# Patient Record
Sex: MALE | Race: AMERICAN INDIAN OR ALASKA NATIVE
[De-identification: names, ages, dates, MRNs, and addresses within clinical notes are randomized per-mention and may not be internally consistent; named-entity substitution may affect disease eponyms.]

---

## 2018-08-19 ENCOUNTER — HOSPITAL ENCOUNTER (INPATIENT)
Dept: HOSPITAL 5 - ED | Age: 58
LOS: 34 days | Discharge: HOME | DRG: 689 | End: 2018-09-22
Attending: INTERNAL MEDICINE | Admitting: INTERNAL MEDICINE
Payer: COMMERCIAL

## 2018-08-19 DIAGNOSIS — E86.0: ICD-10-CM

## 2018-08-19 DIAGNOSIS — D69.6: ICD-10-CM

## 2018-08-19 DIAGNOSIS — G93.41: ICD-10-CM

## 2018-08-19 DIAGNOSIS — N39.0: Primary | ICD-10-CM

## 2018-08-19 DIAGNOSIS — E43: ICD-10-CM

## 2018-08-19 DIAGNOSIS — R62.7: ICD-10-CM

## 2018-08-19 DIAGNOSIS — F20.9: ICD-10-CM

## 2018-08-19 DIAGNOSIS — F41.9: ICD-10-CM

## 2018-08-19 DIAGNOSIS — F31.9: ICD-10-CM

## 2018-08-19 LAB
ALBUMIN SERPL-MCNC: 3.6 G/DL (ref 3.9–5)
ALT SERPL-CCNC: 63 UNITS/L (ref 7–56)
BACTERIA #/AREA URNS HPF: (no result) /HPF
BASOPHILS # (AUTO): 0 K/MM3 (ref 0–0.1)
BASOPHILS NFR BLD AUTO: 0.3 % (ref 0–1.8)
BENZODIAZEPINES SCREEN,URINE: (no result)
BILIRUB UR QL STRIP: (no result)
BLOOD UR QL VISUAL: (no result)
BUN SERPL-MCNC: 13 MG/DL (ref 9–20)
BUN/CREAT SERPL: 16 %
CALCIUM SERPL-MCNC: 9.1 MG/DL (ref 8.4–10.2)
EOSINOPHIL # BLD AUTO: 0.1 K/MM3 (ref 0–0.4)
EOSINOPHIL NFR BLD AUTO: 0.8 % (ref 0–4.3)
HCT VFR BLD CALC: 43.2 % (ref 35.5–45.6)
HEMOLYSIS INDEX: 1
HGB BLD-MCNC: 14.3 GM/DL (ref 11.8–15.2)
LYMPHOCYTES # BLD AUTO: 0.5 K/MM3 (ref 1.2–5.4)
LYMPHOCYTES NFR BLD AUTO: 7.4 % (ref 13.4–35)
MCH RBC QN AUTO: 28 PG (ref 28–32)
MCHC RBC AUTO-ENTMCNC: 33 % (ref 32–34)
MCV RBC AUTO: 83 FL (ref 84–94)
METHADONE SCREEN,URINE: (no result)
MONOCYTES # (AUTO): 0.4 K/MM3 (ref 0–0.8)
MONOCYTES % (AUTO): 5.8 % (ref 0–7.3)
MUCOUS THREADS #/AREA URNS HPF: (no result) /HPF
OPIATE SCREEN,URINE: (no result)
PH UR STRIP: 7 [PH] (ref 5–7)
PLATELET # BLD: 81 K/MM3 (ref 140–440)
RBC # BLD AUTO: 5.19 M/MM3 (ref 3.65–5.03)
RBC #/AREA URNS HPF: 6 /HPF (ref 0–6)
T4 FREE SERPL-MCNC: 0.92 NG/DL (ref 0.76–1.46)
UROBILINOGEN UR-MCNC: 4 MG/DL (ref ?–2)
WBC #/AREA URNS HPF: 92 /HPF (ref 0–6)

## 2018-08-19 PROCEDURE — 85027 COMPLETE CBC AUTOMATED: CPT

## 2018-08-19 PROCEDURE — 81001 URINALYSIS AUTO W/SCOPE: CPT

## 2018-08-19 PROCEDURE — 87086 URINE CULTURE/COLONY COUNT: CPT

## 2018-08-19 PROCEDURE — 36415 COLL VENOUS BLD VENIPUNCTURE: CPT

## 2018-08-19 PROCEDURE — 84484 ASSAY OF TROPONIN QUANT: CPT

## 2018-08-19 PROCEDURE — 83735 ASSAY OF MAGNESIUM: CPT

## 2018-08-19 PROCEDURE — 72170 X-RAY EXAM OF PELVIS: CPT

## 2018-08-19 PROCEDURE — 85025 COMPLETE CBC W/AUTO DIFF WBC: CPT

## 2018-08-19 PROCEDURE — G0480 DRUG TEST DEF 1-7 CLASSES: HCPCS

## 2018-08-19 PROCEDURE — 84443 ASSAY THYROID STIM HORMONE: CPT

## 2018-08-19 PROCEDURE — 82962 GLUCOSE BLOOD TEST: CPT

## 2018-08-19 PROCEDURE — 82553 CREATINE MB FRACTION: CPT

## 2018-08-19 PROCEDURE — 70450 CT HEAD/BRAIN W/O DYE: CPT

## 2018-08-19 PROCEDURE — 87040 BLOOD CULTURE FOR BACTERIA: CPT

## 2018-08-19 PROCEDURE — 84439 ASSAY OF FREE THYROXINE: CPT

## 2018-08-19 PROCEDURE — 71045 X-RAY EXAM CHEST 1 VIEW: CPT

## 2018-08-19 PROCEDURE — 82140 ASSAY OF AMMONIA: CPT

## 2018-08-19 PROCEDURE — 93010 ELECTROCARDIOGRAM REPORT: CPT

## 2018-08-19 PROCEDURE — 82607 VITAMIN B-12: CPT

## 2018-08-19 PROCEDURE — 80053 COMPREHEN METABOLIC PANEL: CPT

## 2018-08-19 PROCEDURE — 93005 ELECTROCARDIOGRAM TRACING: CPT

## 2018-08-19 PROCEDURE — 87076 CULTURE ANAEROBE IDENT EACH: CPT

## 2018-08-19 PROCEDURE — 87186 SC STD MICRODIL/AGAR DIL: CPT

## 2018-08-19 PROCEDURE — 95819 EEG AWAKE AND ASLEEP: CPT

## 2018-08-19 PROCEDURE — 70551 MRI BRAIN STEM W/O DYE: CPT

## 2018-08-19 PROCEDURE — 82747 ASSAY OF FOLIC ACID RBC: CPT

## 2018-08-19 PROCEDURE — 80048 BASIC METABOLIC PNL TOTAL CA: CPT

## 2018-08-19 PROCEDURE — 80320 DRUG SCREEN QUANTALCOHOLS: CPT

## 2018-08-19 PROCEDURE — 74018 RADEX ABDOMEN 1 VIEW: CPT

## 2018-08-19 PROCEDURE — 80307 DRUG TEST PRSMV CHEM ANLYZR: CPT

## 2018-08-19 PROCEDURE — 82550 ASSAY OF CK (CPK): CPT

## 2018-08-19 RX ADMIN — LORAZEPAM SCH MG: 2 INJECTION INTRAMUSCULAR; INTRAVENOUS at 21:35

## 2018-08-19 NOTE — CAT SCAN REPORT
FINAL REPORT



EXAM:  CT HEAD/BRAIN WO CON



HISTORY:  AMS, schizophrenia 



TECHNIQUE:  Standard unenhanced CT of the head at 5.0 millimeter

axial increments. 



PRIORS:  None.



FINDINGS:  

The ventricular system is normal in size and configuration. There

is no evidence for parenchymal volume loss.



There is no evidence for mass lesion, mass effect, midline shift,

acute intracranial hemorrhage, or acute ischemia/ infarction.



No evidence for acute skull fracture is seen.  No abnormality in

the overlying scalp soft tissues is seen.



Visualized paranasal sinuses are clear.



IMPRESSION:  

Negative CT of the head. No acute intracranial process noted.

## 2018-08-19 NOTE — EMERGENCY DEPARTMENT REPORT
ED Altered Mental Status HPI





- General


Chief Complaint: Medical Clearance


Stated Complaint: DEHYDRATION


Time Seen by Provider: 08/19/18 17:15


Source: family, EMS, old records reviewed (no previous medical record for review

)


Mode of arrival: Stretcher


Limitations: No Limitations, Altered Mental Status





- History of Present Illness


Initial Comments: 





58-year-old male with a past medical history schizophrenia, depression, and 

anxiety presents to the hospital with his family members for alteration in 

mental status.  End of July patient was experiencing hallucinations and 

therefore received an IM Aristada injection as an outpatient on July 30.  His 

hallucinations and threatening behavior worsened despite receiving a shot and 

therefore patient was taken to [facility] on Aug 6th after medical clearance at 

[facility] (presented Aug 3).  Family states they were informed by patient'

s primary psychiatrist that patient should now only be taking Abilify since he 

did receive an Aristada shot.  He was supposed to discontinue Zyprexa.  Family 

states he continued to receive Zyprexa while under inpatient psychiatric care.  

They received reports that patient was combative and continued to hallucinated 

and therefore was receiving multiple medications for sedation.  Patient was 

discharged back to the family care but presented in a catatonic state, wearing 

a diaper, unable to ambulate, unable to speak or care for himself on Aug 13.  

At his baseline patient has hallucinations due to psychosis but is typically 

alert and oriented 3 and able to perform daily living activities. Family 

recalled ambulance due to his state and he was taken back to [facility] 

for medical clearance. He was then transfered by back to [facility] on Aug 

14th. Now pt has not ambulated, eaten or had any thing to drink in at least the 

past 4-5 days so he was brought to the ED for evaluation. Pt also fell out of a 

wheelchair during [facility] stay.





- Related Data


 Allergies











Allergy/AdvReac Type Severity Reaction Status Date / Time


 


No Known Allergies Allergy   Unverified 08/19/18 17:07














ED Review of Systems


ROS: 


Stated complaint: DEHYDRATION


Other details as noted in HPI








ED Past Medical Hx





- Past Medical History


Hx Psychiatric Treatment: Yes (schizophrenia, depression, anxiety)





- Social History


Smoking Status: Never Smoker


Substance Use Type: None





ED Physical Exam





- General


Limitations: No Limitations, Altered Mental Status





- Other


Other exam information: 





General: Altered, thin-appearing


Head exam: Atraumatic, normocephalic


Eyes exam:  pupils equal reactive to light, extraocular movements intact


ENT: Extremity dry oral mucous membranes


Neck exam: Normal inspection, no meningismus


Respiratory exam: Clear to auscultation bilateral, no wheezes, rales, crackles


Cardiovascular: Normal rate and rhythm, normal heart sounds


Abdomen: Soft, nondistended, and  nontender, with normal bowel sounds, no 

rebound, or guarding


Extremity: Grimace with passive movement of right hip.  No deformity


Back: Normal Inspection, full range of motion, no tenderness


Neurologic: Altered, lethargic, incomprehensible sounds, does not follow 

commands, moves all extremities equally with sensation grossly intact


Psychiatric: normal affect, normal mood


Skin: Warm, dry, intact





ED Course


 Vital Signs











  08/19/18 08/19/18 08/19/18





  16:42 16:45 16:53


 


Temperature   


 


Pulse Rate 95 H 94 H 92 H


 


Respiratory 14 14 18





Rate   


 


Blood Pressure  110/71 110/71


 


Blood Pressure   





[Left]   


 


O2 Sat by Pulse  97 97





Oximetry   














  08/19/18 08/19/18 08/19/18





  17:00 17:15 17:30


 


Temperature  100 F H 


 


Pulse Rate 95 H 91 H 88


 


Respiratory 15 14 14





Rate   


 


Blood Pressure 110/71 105/75 105/75


 


Blood Pressure   





[Left]   


 


O2 Sat by Pulse 99 97 





Oximetry   














  08/19/18 08/19/18





  18:22 19:15


 


Temperature  100.6 F H


 


Pulse Rate  93 H


 


Respiratory 18 14





Rate  


 


Blood Pressure  


 


Blood Pressure  113/77





[Left]  


 


O2 Sat by Pulse 97 99





Oximetry  














- Consultations


Consultation #1: 





08/19/18 20:49


case d/w Dr Fernandez regarding right trochanter avulsion fracture and acetabular 

irregularity.  He is not on call today but will be available to consult in 

house tomorrow.





- Lab Data


Result diagrams: 


 08/19/18 17:27





 08/19/18 17:27


 Lab Results











  08/19/18 08/19/18 08/19/18 Range/Units





  17:24 17:27 17:27 


 


WBC   7.3   (4.5-11.0)  K/mm3


 


RBC   5.19 H   (3.65-5.03)  M/mm3


 


Hgb   14.3   (11.8-15.2)  gm/dl


 


Hct   43.2   (35.5-45.6)  %


 


MCV   83 L   (84-94)  fl


 


MCH   28   (28-32)  pg


 


MCHC   33   (32-34)  %


 


RDW   14.9   (13.2-15.2)  %


 


Plt Count   81 L   (140-440)  K/mm3


 


Lymph % (Auto)   7.4 L   (13.4-35.0)  %


 


Mono % (Auto)   5.8   (0.0-7.3)  %


 


Eos % (Auto)   0.8   (0.0-4.3)  %


 


Baso % (Auto)   0.3   (0.0-1.8)  %


 


Lymph #   0.5 L   (1.2-5.4)  K/mm3


 


Mono #   0.4   (0.0-0.8)  K/mm3


 


Eos #   0.1   (0.0-0.4)  K/mm3


 


Baso #   0.0   (0.0-0.1)  K/mm3


 


Seg Neutrophils %   85.7 H   (40.0-70.0)  %


 


Seg Neutrophils #   6.2   (1.8-7.7)  K/mm3


 


Sodium    141  (137-145)  mmol/L


 


Potassium    3.8  (3.6-5.0)  mmol/L


 


Chloride    99.9  ()  mmol/L


 


Carbon Dioxide    28  (22-30)  mmol/L


 


Anion Gap    17  mmol/L


 


BUN    13  (9-20)  mg/dL


 


Creatinine    0.8  (0.8-1.5)  mg/dL


 


Estimated GFR    > 60  ml/min


 


BUN/Creatinine Ratio    16  %


 


Glucose    148 H  ()  mg/dL


 


POC Glucose     ()  


 


Calcium    9.1  (8.4-10.2)  mg/dL


 


Magnesium  2.20    (1.7-2.3)  mg/dL


 


Total Bilirubin    1.20  (0.1-1.2)  mg/dL


 


AST    50 H  (5-40)  units/L


 


ALT    63 H  (7-56)  units/L


 


Alkaline Phosphatase    67  ()  units/L


 


Ammonia     (25-60)  umol/L


 


Total Creatine Kinase     ()  units/L


 


CK-MB (CK-2)     (0.0-4.0)  ng/mL


 


CK-MB (CK-2) Rel Index     (0-4)  


 


Troponin T     (0.00-0.029)  ng/mL


 


Total Protein    7.2  (6.3-8.2)  g/dL


 


Albumin    3.6 L  (3.9-5)  g/dL


 


Albumin/Globulin Ratio    1.0  %


 


TSH     (0.270-4.200)  mlU/mL


 


Free T4     (0.76-1.46)  ng/dL


 


Urine Color     (Yellow)  


 


Urine Turbidity     (Clear)  


 


Urine pH     (5.0-7.0)  


 


Ur Specific Gravity     (1.003-1.030)  


 


Urine Protein     (Negative)  mg/dL


 


Urine Glucose (UA)     (Negative)  mg/dL


 


Urine Ketones     (Negative)  mg/dL


 


Urine Blood     (Negative)  


 


Urine Nitrite     (Negative)  


 


Urine Bilirubin     (Negative)  


 


Urine Urobilinogen     (<2.0)  mg/dL


 


Ur Leukocyte Esterase     (Negative)  


 


Urine WBC (Auto)     (0.0-6.0)  /HPF


 


Urine RBC (Auto)     (0.0-6.0)  /HPF


 


U Epithel Cells (Auto)     (0-13.0)  /HPF


 


Urine Bacteria (Auto)     (Negative)  /HPF


 


Ur Transition Epith Cell     /HPF


 


Urine Mucus     /HPF


 


Salicylates     (2.8-20.0)  mg/dL


 


Urine Opiates Screen     


 


Urine Methadone Screen     


 


Acetaminophen     (10.0-30.0)  ug/mL


 


Ur Barbiturates Screen     


 


Ur Phencyclidine Scrn     


 


Ur Amphetamines Screen     


 


U Benzodiazepines Scrn     


 


Urine Cocaine Screen     


 


U Marijuana (THC) Screen     


 


Drugs of Abuse Note     


 


Plasma/Serum Alcohol     (0-0.07)  %














  08/19/18 08/19/18 08/19/18 Range/Units





  17:27 17:27 17:27 


 


WBC     (4.5-11.0)  K/mm3


 


RBC     (3.65-5.03)  M/mm3


 


Hgb     (11.8-15.2)  gm/dl


 


Hct     (35.5-45.6)  %


 


MCV     (84-94)  fl


 


MCH     (28-32)  pg


 


MCHC     (32-34)  %


 


RDW     (13.2-15.2)  %


 


Plt Count     (140-440)  K/mm3


 


Lymph % (Auto)     (13.4-35.0)  %


 


Mono % (Auto)     (0.0-7.3)  %


 


Eos % (Auto)     (0.0-4.3)  %


 


Baso % (Auto)     (0.0-1.8)  %


 


Lymph #     (1.2-5.4)  K/mm3


 


Mono #     (0.0-0.8)  K/mm3


 


Eos #     (0.0-0.4)  K/mm3


 


Baso #     (0.0-0.1)  K/mm3


 


Seg Neutrophils %     (40.0-70.0)  %


 


Seg Neutrophils #     (1.8-7.7)  K/mm3


 


Sodium     (137-145)  mmol/L


 


Potassium     (3.6-5.0)  mmol/L


 


Chloride     ()  mmol/L


 


Carbon Dioxide     (22-30)  mmol/L


 


Anion Gap     mmol/L


 


BUN     (9-20)  mg/dL


 


Creatinine     (0.8-1.5)  mg/dL


 


Estimated GFR     ml/min


 


BUN/Creatinine Ratio     %


 


Glucose     ()  mg/dL


 


POC Glucose     ()  


 


Calcium     (8.4-10.2)  mg/dL


 


Magnesium     (1.7-2.3)  mg/dL


 


Total Bilirubin     (0.1-1.2)  mg/dL


 


AST     (5-40)  units/L


 


ALT     (7-56)  units/L


 


Alkaline Phosphatase     ()  units/L


 


Ammonia     (25-60)  umol/L


 


Total Creatine Kinase     ()  units/L


 


CK-MB (CK-2)     (0.0-4.0)  ng/mL


 


CK-MB (CK-2) Rel Index     (0-4)  


 


Troponin T     (0.00-0.029)  ng/mL


 


Total Protein     (6.3-8.2)  g/dL


 


Albumin     (3.9-5)  g/dL


 


Albumin/Globulin Ratio     %


 


TSH     (0.270-4.200)  mlU/mL


 


Free T4     (0.76-1.46)  ng/dL


 


Urine Color     (Yellow)  


 


Urine Turbidity     (Clear)  


 


Urine pH     (5.0-7.0)  


 


Ur Specific Gravity     (1.003-1.030)  


 


Urine Protein     (Negative)  mg/dL


 


Urine Glucose (UA)     (Negative)  mg/dL


 


Urine Ketones     (Negative)  mg/dL


 


Urine Blood     (Negative)  


 


Urine Nitrite     (Negative)  


 


Urine Bilirubin     (Negative)  


 


Urine Urobilinogen     (<2.0)  mg/dL


 


Ur Leukocyte Esterase     (Negative)  


 


Urine WBC (Auto)     (0.0-6.0)  /HPF


 


Urine RBC (Auto)     (0.0-6.0)  /HPF


 


U Epithel Cells (Auto)     (0-13.0)  /HPF


 


Urine Bacteria (Auto)     (Negative)  /HPF


 


Ur Transition Epith Cell     /HPF


 


Urine Mucus     /HPF


 


Salicylates  < 0.3 L    (2.8-20.0)  mg/dL


 


Urine Opiates Screen     


 


Urine Methadone Screen     


 


Acetaminophen   < 5.0 L   (10.0-30.0)  ug/mL


 


Ur Barbiturates Screen     


 


Ur Phencyclidine Scrn     


 


Ur Amphetamines Screen     


 


U Benzodiazepines Scrn     


 


Urine Cocaine Screen     


 


U Marijuana (THC) Screen     


 


Drugs of Abuse Note     


 


Plasma/Serum Alcohol    < 0.01  (0-0.07)  %














  08/19/18 08/19/18 08/19/18 Range/Units





  17:27 17:27 17:27 


 


WBC     (4.5-11.0)  K/mm3


 


RBC     (3.65-5.03)  M/mm3


 


Hgb     (11.8-15.2)  gm/dl


 


Hct     (35.5-45.6)  %


 


MCV     (84-94)  fl


 


MCH     (28-32)  pg


 


MCHC     (32-34)  %


 


RDW     (13.2-15.2)  %


 


Plt Count     (140-440)  K/mm3


 


Lymph % (Auto)     (13.4-35.0)  %


 


Mono % (Auto)     (0.0-7.3)  %


 


Eos % (Auto)     (0.0-4.3)  %


 


Baso % (Auto)     (0.0-1.8)  %


 


Lymph #     (1.2-5.4)  K/mm3


 


Mono #     (0.0-0.8)  K/mm3


 


Eos #     (0.0-0.4)  K/mm3


 


Baso #     (0.0-0.1)  K/mm3


 


Seg Neutrophils %     (40.0-70.0)  %


 


Seg Neutrophils #     (1.8-7.7)  K/mm3


 


Sodium     (137-145)  mmol/L


 


Potassium     (3.6-5.0)  mmol/L


 


Chloride     ()  mmol/L


 


Carbon Dioxide     (22-30)  mmol/L


 


Anion Gap     mmol/L


 


BUN     (9-20)  mg/dL


 


Creatinine     (0.8-1.5)  mg/dL


 


Estimated GFR     ml/min


 


BUN/Creatinine Ratio     %


 


Glucose     ()  mg/dL


 


POC Glucose     ()  


 


Calcium     (8.4-10.2)  mg/dL


 


Magnesium     (1.7-2.3)  mg/dL


 


Total Bilirubin     (0.1-1.2)  mg/dL


 


AST     (5-40)  units/L


 


ALT     (7-56)  units/L


 


Alkaline Phosphatase     ()  units/L


 


Ammonia  17.0 L    (25-60)  umol/L


 


Total Creatine Kinase   147   ()  units/L


 


CK-MB (CK-2)     (0.0-4.0)  ng/mL


 


CK-MB (CK-2) Rel Index     (0-4)  


 


Troponin T     (0.00-0.029)  ng/mL


 


Total Protein     (6.3-8.2)  g/dL


 


Albumin     (3.9-5)  g/dL


 


Albumin/Globulin Ratio     %


 


TSH    3.030  (0.270-4.200)  mlU/mL


 


Free T4    0.92  (0.76-1.46)  ng/dL


 


Urine Color     (Yellow)  


 


Urine Turbidity     (Clear)  


 


Urine pH     (5.0-7.0)  


 


Ur Specific Gravity     (1.003-1.030)  


 


Urine Protein     (Negative)  mg/dL


 


Urine Glucose (UA)     (Negative)  mg/dL


 


Urine Ketones     (Negative)  mg/dL


 


Urine Blood     (Negative)  


 


Urine Nitrite     (Negative)  


 


Urine Bilirubin     (Negative)  


 


Urine Urobilinogen     (<2.0)  mg/dL


 


Ur Leukocyte Esterase     (Negative)  


 


Urine WBC (Auto)     (0.0-6.0)  /HPF


 


Urine RBC (Auto)     (0.0-6.0)  /HPF


 


U Epithel Cells (Auto)     (0-13.0)  /HPF


 


Urine Bacteria (Auto)     (Negative)  /HPF


 


Ur Transition Epith Cell     /HPF


 


Urine Mucus     /HPF


 


Salicylates     (2.8-20.0)  mg/dL


 


Urine Opiates Screen     


 


Urine Methadone Screen     


 


Acetaminophen     (10.0-30.0)  ug/mL


 


Ur Barbiturates Screen     


 


Ur Phencyclidine Scrn     


 


Ur Amphetamines Screen     


 


U Benzodiazepines Scrn     


 


Urine Cocaine Screen     


 


U Marijuana (THC) Screen     


 


Drugs of Abuse Note     


 


Plasma/Serum Alcohol     (0-0.07)  %














  08/19/18 08/19/18 08/19/18 Range/Units





  17:43 17:44 17:59 


 


WBC     (4.5-11.0)  K/mm3


 


RBC     (3.65-5.03)  M/mm3


 


Hgb     (11.8-15.2)  gm/dl


 


Hct     (35.5-45.6)  %


 


MCV     (84-94)  fl


 


MCH     (28-32)  pg


 


MCHC     (32-34)  %


 


RDW     (13.2-15.2)  %


 


Plt Count     (140-440)  K/mm3


 


Lymph % (Auto)     (13.4-35.0)  %


 


Mono % (Auto)     (0.0-7.3)  %


 


Eos % (Auto)     (0.0-4.3)  %


 


Baso % (Auto)     (0.0-1.8)  %


 


Lymph #     (1.2-5.4)  K/mm3


 


Mono #     (0.0-0.8)  K/mm3


 


Eos #     (0.0-0.4)  K/mm3


 


Baso #     (0.0-0.1)  K/mm3


 


Seg Neutrophils %     (40.0-70.0)  %


 


Seg Neutrophils #     (1.8-7.7)  K/mm3


 


Sodium     (137-145)  mmol/L


 


Potassium     (3.6-5.0)  mmol/L


 


Chloride     ()  mmol/L


 


Carbon Dioxide     (22-30)  mmol/L


 


Anion Gap     mmol/L


 


BUN     (9-20)  mg/dL


 


Creatinine     (0.8-1.5)  mg/dL


 


Estimated GFR     ml/min


 


BUN/Creatinine Ratio     %


 


Glucose     ()  mg/dL


 


POC Glucose     ()  


 


Calcium     (8.4-10.2)  mg/dL


 


Magnesium     (1.7-2.3)  mg/dL


 


Total Bilirubin     (0.1-1.2)  mg/dL


 


AST     (5-40)  units/L


 


ALT     (7-56)  units/L


 


Alkaline Phosphatase     ()  units/L


 


Ammonia     (25-60)  umol/L


 


Total Creatine Kinase   147   ()  units/L


 


CK-MB (CK-2)   1.6   (0.0-4.0)  ng/mL


 


CK-MB (CK-2) Rel Index   1.0   (0-4)  


 


Troponin T  < 0.010    (0.00-0.029)  ng/mL


 


Total Protein     (6.3-8.2)  g/dL


 


Albumin     (3.9-5)  g/dL


 


Albumin/Globulin Ratio     %


 


TSH     (0.270-4.200)  mlU/mL


 


Free T4     (0.76-1.46)  ng/dL


 


Urine Color    Shamika  (Yellow)  


 


Urine Turbidity    Cloudy  (Clear)  


 


Urine pH    7.0  (5.0-7.0)  


 


Ur Specific Gravity    1.021  (1.003-1.030)  


 


Urine Protein    30 mg/dl  (Negative)  mg/dL


 


Urine Glucose (UA)    Neg  (Negative)  mg/dL


 


Urine Ketones    Neg  (Negative)  mg/dL


 


Urine Blood    Neg  (Negative)  


 


Urine Nitrite    Neg  (Negative)  


 


Urine Bilirubin    Neg  (Negative)  


 


Urine Urobilinogen    4.0  (<2.0)  mg/dL


 


Ur Leukocyte Esterase    Lg  (Negative)  


 


Urine WBC (Auto)    92.0 H  (0.0-6.0)  /HPF


 


Urine RBC (Auto)    6.0  (0.0-6.0)  /HPF


 


U Epithel Cells (Auto)    < 1.0  (0-13.0)  /HPF


 


Urine Bacteria (Auto)    3+  (Negative)  /HPF


 


Ur Transition Epith Cell    2  /HPF


 


Urine Mucus    1+  /HPF


 


Salicylates     (2.8-20.0)  mg/dL


 


Urine Opiates Screen     


 


Urine Methadone Screen     


 


Acetaminophen     (10.0-30.0)  ug/mL


 


Ur Barbiturates Screen     


 


Ur Phencyclidine Scrn     


 


Ur Amphetamines Screen     


 


U Benzodiazepines Scrn     


 


Urine Cocaine Screen     


 


U Marijuana (THC) Screen     


 


Drugs of Abuse Note     


 


Plasma/Serum Alcohol     (0-0.07)  %














  08/19/18 08/19/18 Range/Units





  17:59 18:07 


 


WBC    (4.5-11.0)  K/mm3


 


RBC    (3.65-5.03)  M/mm3


 


Hgb    (11.8-15.2)  gm/dl


 


Hct    (35.5-45.6)  %


 


MCV    (84-94)  fl


 


MCH    (28-32)  pg


 


MCHC    (32-34)  %


 


RDW    (13.2-15.2)  %


 


Plt Count    (140-440)  K/mm3


 


Lymph % (Auto)    (13.4-35.0)  %


 


Mono % (Auto)    (0.0-7.3)  %


 


Eos % (Auto)    (0.0-4.3)  %


 


Baso % (Auto)    (0.0-1.8)  %


 


Lymph #    (1.2-5.4)  K/mm3


 


Mono #    (0.0-0.8)  K/mm3


 


Eos #    (0.0-0.4)  K/mm3


 


Baso #    (0.0-0.1)  K/mm3


 


Seg Neutrophils %    (40.0-70.0)  %


 


Seg Neutrophils #    (1.8-7.7)  K/mm3


 


Sodium    (137-145)  mmol/L


 


Potassium    (3.6-5.0)  mmol/L


 


Chloride    ()  mmol/L


 


Carbon Dioxide    (22-30)  mmol/L


 


Anion Gap    mmol/L


 


BUN    (9-20)  mg/dL


 


Creatinine    (0.8-1.5)  mg/dL


 


Estimated GFR    ml/min


 


BUN/Creatinine Ratio    %


 


Glucose    ()  mg/dL


 


POC Glucose   140 H  ()  


 


Calcium    (8.4-10.2)  mg/dL


 


Magnesium    (1.7-2.3)  mg/dL


 


Total Bilirubin    (0.1-1.2)  mg/dL


 


AST    (5-40)  units/L


 


ALT    (7-56)  units/L


 


Alkaline Phosphatase    ()  units/L


 


Ammonia    (25-60)  umol/L


 


Total Creatine Kinase    ()  units/L


 


CK-MB (CK-2)    (0.0-4.0)  ng/mL


 


CK-MB (CK-2) Rel Index    (0-4)  


 


Troponin T    (0.00-0.029)  ng/mL


 


Total Protein    (6.3-8.2)  g/dL


 


Albumin    (3.9-5)  g/dL


 


Albumin/Globulin Ratio    %


 


TSH    (0.270-4.200)  mlU/mL


 


Free T4    (0.76-1.46)  ng/dL


 


Urine Color    (Yellow)  


 


Urine Turbidity    (Clear)  


 


Urine pH    (5.0-7.0)  


 


Ur Specific Gravity    (1.003-1.030)  


 


Urine Protein    (Negative)  mg/dL


 


Urine Glucose (UA)    (Negative)  mg/dL


 


Urine Ketones    (Negative)  mg/dL


 


Urine Blood    (Negative)  


 


Urine Nitrite    (Negative)  


 


Urine Bilirubin    (Negative)  


 


Urine Urobilinogen    (<2.0)  mg/dL


 


Ur Leukocyte Esterase    (Negative)  


 


Urine WBC (Auto)    (0.0-6.0)  /HPF


 


Urine RBC (Auto)    (0.0-6.0)  /HPF


 


U Epithel Cells (Auto)    (0-13.0)  /HPF


 


Urine Bacteria (Auto)    (Negative)  /HPF


 


Ur Transition Epith Cell    /HPF


 


Urine Mucus    /HPF


 


Salicylates    (2.8-20.0)  mg/dL


 


Urine Opiates Screen  Presumptive negative   


 


Urine Methadone Screen  Presumptive negative   


 


Acetaminophen    (10.0-30.0)  ug/mL


 


Ur Barbiturates Screen  Presumptive negative   


 


Ur Phencyclidine Scrn  Presumptive negative   


 


Ur Amphetamines Screen  Presumptive negative   


 


U Benzodiazepines Scrn  Presumptive negative   


 


Urine Cocaine Screen  Presumptive negative   


 


U Marijuana (THC) Screen  Presumptive negative   


 


Drugs of Abuse Note  Disclamer   


 


Plasma/Serum Alcohol    (0-0.07)  %














- EKG Data


-: EKG Interpreted by Me


EKG shows normal: sinus rhythm, axis (qrs 65), QRS complexes (qrsd 106), ST-T 

waves (no stemi/t inv)


Rate: normal (65)


When compared to previous EKG there are: previous EKG unavailable





- Radiology Data


Radiology results: report reviewed


FINAL REPORT 





EXAM: CT HEAD/BRAIN WO CON 





HISTORY: AMS, schizophrenia 





TECHNIQUE: Standard unenhanced CT of the head at 5.0 millimeter 


axial increments. 





PRIORS: None. 





FINDINGS: 


The ventricular system is normal in size and configuration. There 


is no evidence for parenchymal volume loss. 





There is no evidence for mass lesion, mass effect, midline shift, 


acute intracranial hemorrhage, or acute ischemia/ infarction. 





No evidence for acute skull fracture is seen. No abnormality in 


the overlying scalp soft tissues is seen. 





Visualized paranasal sinuses are clear. 





IMPRESSION: 


Negative CT of the head. No acute intracranial process noted. 














FINAL REPORT 





EXAM: XR CHEST 1V AP 





HISTORY: ams, low grade fever 





TECHNIQUE: Frontal chest x-ray 





Comparison: None 





FINDINGS: 


Heart size is upper limits normal with mild left ventricular 


configuration. 





Lungs are clear and well expanded without focal infiltrate or 


consolidation. 





Tortuous descending aorta. 





Bones are osteopenic. 





Mild pulmonary vascular prominence without overt failure. 





IMPRESSION: 


Mild left ventricular configuration of the heart compatible with 


systemic hypertension. 





No discrete focal infiltrate. 





Mild pulmonary vascular prominence without failure. 








FINAL REPORT 





EXAM: XR HIP 2-3V RT 





HISTORY: right hip pain after fall 





TECHNIQUE: AP pelvis and lateral view right hip 





Comparison: None 





FINDINGS: 


There is global osteopenia. 





There is a lesser trochanter avulsion with approximately 6 


millimeter displacement of 1.7 x 0.7 centimeter fracture 


fragment. 





Femoral head is normally located. 





There is mild degenerative change of the left hip joint with mild 


acetabular spurring. Lateral view of the right hip shows 


irregularity of the acetabulum which may be due to 


superimposition artifact. Posterior right acetabulum is 


incompletely assessed. 





There is SI joint lipping and possible superior ankylosis of the 


right SI joint. 





IMPRESSION: 


Right lesser trochanter avulsion fracture fragment. 





No femoral head or neck fracture. No dislocation. 





Irregularity of the posterior right acetabulum incompletely 


assessed. Recommend either additional hip x-rays or CT right hip. 





- Medical Decision Making





ams


ct head: 


possible over medication likely cause 


+ uti, straight cath urine, culture pending, rocephin ordered


no signs of sepsis or septic shock


clinical dehydration with no po intake for several days, renal function, 

electrolytes normal. IVF NS 1 liter initiated


pt will benefit from psych management inhouse. 1013 signed





thrombocytopenia


previous platelet count not available


can be caused by Zyprexa





Right lessor trochanter avulsion fracture


Dr. Wayne orthopedist consulted and will evaluate pt





pt will be admitted to hospitalist service





- Differential Diagnosis


encephalopathy, CVA, electrolyte abnormality, dehydration, psychosis


Critical Care Time: No


Critical care attestation.: 


If time is entered above; I have spent that time in minutes in the direct care 

of this critically ill patient, excluding procedure time.








ED Disposition


Clinical Impression: 


 Altered mental status, Schizophrenia, UTI (urinary tract infection), 

Thrombocytopenia, Dehydration, Poor fluid intake, Fracture of lesser trochanter 

of femur





Disposition: DC-09 OP ADMIT IP TO THIS HOSP


Is pt being admited?: Yes


Condition: Stable


Time of Disposition: 19:30 (Dr Oreilly/hosp)

## 2018-08-19 NOTE — XRAY REPORT
FINAL REPORT



EXAM:  XR HIP 2-3V RT



HISTORY:  right hip pain after fall 



TECHNIQUE:  AP pelvis and lateral view right hip



Comparison: None



FINDINGS:  

There is global osteopenia.



There is a lesser trochanter avulsion with approximately 6

millimeter displacement of 1.7 x 0.7 centimeter fracture

fragment.



Femoral head is normally located.



There is mild degenerative change of the left hip joint with mild

acetabular spurring. Lateral view of the right hip shows

irregularity of the acetabulum which may be due to

superimposition artifact. Posterior right acetabulum is

incompletely assessed. 



There is SI joint lipping and possible superior ankylosis of the

right SI joint.



IMPRESSION:  

Right lesser trochanter avulsion fracture fragment.



No femoral head or neck fracture. No dislocation. 



Irregularity of the posterior right acetabulum incompletely

assessed. Recommend either additional hip x-rays or CT right hip.

## 2018-08-19 NOTE — XRAY REPORT
FINAL REPORT



EXAM:  XR CHEST 1V AP



HISTORY:  ams, low grade fever 



TECHNIQUE:  Frontal chest x-ray



Comparison: None



FINDINGS:  

Heart size is upper limits normal with mild left ventricular

configuration.



Lungs are clear and well expanded without focal infiltrate or

consolidation.



Tortuous descending aorta.



Bones are osteopenic.



Mild pulmonary vascular prominence without overt failure.



IMPRESSION:  

Mild left ventricular configuration of the heart compatible with

systemic hypertension.



No discrete focal infiltrate.



Mild pulmonary vascular prominence without failure.

## 2018-08-20 RX ADMIN — DEXTROSE AND SODIUM CHLORIDE SCH MLS/HR: 5; .45 INJECTION, SOLUTION INTRAVENOUS at 23:09

## 2018-08-20 RX ADMIN — LORAZEPAM SCH: 2 INJECTION INTRAMUSCULAR; INTRAVENOUS at 06:36

## 2018-08-20 RX ADMIN — LORAZEPAM SCH MG: 2 INJECTION INTRAMUSCULAR; INTRAVENOUS at 14:00

## 2018-08-20 RX ADMIN — DEXTROSE AND SODIUM CHLORIDE SCH MLS/HR: 5; .45 INJECTION, SOLUTION INTRAVENOUS at 12:00

## 2018-08-20 RX ADMIN — CEFTRIAXONE SODIUM SCH MLS/HR: 1 INJECTION, POWDER, FOR SOLUTION INTRAMUSCULAR; INTRAVENOUS at 18:00

## 2018-08-20 RX ADMIN — HEPARIN SODIUM SCH UNIT: 5000 INJECTION, SOLUTION INTRAVENOUS; SUBCUTANEOUS at 23:19

## 2018-08-20 RX ADMIN — LORAZEPAM SCH MG: 2 INJECTION INTRAMUSCULAR; INTRAVENOUS at 23:19

## 2018-08-20 NOTE — PROGRESS NOTE
Assessment and Plan


Assessment and plan: 





58-year-old -American male came from mental health facility for altered 

mental status


Catatonic schizophrenia


- mental health consulted


Metabolic encephalopathy


- Supportive care


UTI


-Patient is on IV ceftriaxone


Dehydration


- Patient is on D5 half-normal saline, condition is not taking anything by mouth


Suspected femoral fracture


-Evaluated by orthopedics, reviewed his imaging and showed no fracture


DVT prophylaxis


Disposition


- Continue inpatient care





History


Interval history: 





Patient was seen and evaluated this morning, patient did not talk to me, 

patient is in catatonic state.





Hospitalist Physical





- Physical exam


Narrative exam: 


 Not in cardiopulmonary distress. 


 The patient appeared well nourished and normally developed.


 Vital signs as documented.


 Head exam is unremarkable.


 No scleral icterus .


 Neck is without jugular venous distension, thyromegaly, or carotid bruits. 


 Lungs are clear to auscultation.


Cardiac exam reveals regular rate and  Rhythm. First and second heart sounds 

normal. No murmurs, rubs or gallops. 


Abdominal exam reveals normal bowel sounds, no masses, no organomegaly and no 

aortic enlargement. 


Extremities are nonedematous and both femoral and pedal pulses are normal.


CNS: Patient didn't talk to me.  Patient is very stiff.





- Constitutional


Vitals: 


 











Temp Pulse Resp BP Pulse Ox


 


 98.5 F   91 H  18   113/67   96 


 


 08/20/18 11:51  08/20/18 11:51  08/20/18 11:51  08/20/18 11:51  08/20/18 11:51











General appearance: Present: no acute distress





Results





- Labs


CBC & Chem 7: 


 08/19/18 17:27





 08/19/18 17:27


Labs: 


 Laboratory Last Values











WBC  7.3 K/mm3 (4.5-11.0)   08/19/18  17:27    


 


RBC  5.19 M/mm3 (3.65-5.03)  H  08/19/18  17:27    


 


Hgb  14.3 gm/dl (11.8-15.2)   08/19/18  17:27    


 


Hct  43.2 % (35.5-45.6)   08/19/18  17:27    


 


MCV  83 fl (84-94)  L  08/19/18  17:27    


 


MCH  28 pg (28-32)   08/19/18  17:27    


 


MCHC  33 % (32-34)   08/19/18  17:27    


 


RDW  14.9 % (13.2-15.2)   08/19/18  17:27    


 


Plt Count  81 K/mm3 (140-440)  L  08/19/18  17:27    


 


Lymph % (Auto)  7.4 % (13.4-35.0)  L  08/19/18  17:27    


 


Mono % (Auto)  5.8 % (0.0-7.3)   08/19/18  17:27    


 


Eos % (Auto)  0.8 % (0.0-4.3)   08/19/18  17:27    


 


Baso % (Auto)  0.3 % (0.0-1.8)   08/19/18  17:27    


 


Lymph #  0.5 K/mm3 (1.2-5.4)  L  08/19/18  17:27    


 


Mono #  0.4 K/mm3 (0.0-0.8)   08/19/18  17:27    


 


Eos #  0.1 K/mm3 (0.0-0.4)   08/19/18  17:27    


 


Baso #  0.0 K/mm3 (0.0-0.1)   08/19/18  17:27    


 


Total Counted  Cancelled   08/19/18  17:27    


 


Seg Neutrophils %  85.7 % (40.0-70.0)  H  08/19/18  17:27    


 


Seg Neuts % (Manual)  Cancelled   08/19/18  17:27    


 


Band Neutrophils %  Cancelled   08/19/18  17:27    


 


Lymphocytes % (Manual)  Cancelled   08/19/18  17:27    


 


Reactive Lymphs % (Man)  Cancelled   08/19/18  17:27    


 


Monocytes % (Manual)  Cancelled   08/19/18  17:27    


 


Eosinophils % (Manual)  Cancelled   08/19/18  17:27    


 


Basophils % (Manual)  Cancelled   08/19/18  17:27    


 


Metamyelocytes %  Cancelled   08/19/18  17:27    


 


Myelocytes %  Cancelled   08/19/18  17:27    


 


Promyelocytes %  Cancelled   08/19/18  17:27    


 


Blast Cells %  Cancelled   08/19/18  17:27    


 


Nucleated RBC %  Cancelled   08/19/18  17:27    


 


Seg Neutrophils #  6.2 K/mm3 (1.8-7.7)   08/19/18  17:27    


 


Seg Neutrophils # Man  Cancelled   08/19/18  17:27    


 


Band Neutrophils #  Cancelled   08/19/18  17:27    


 


Lymphocytes # (Manual)  Cancelled   08/19/18  17:27    


 


Abs React Lymphs (Man)  Cancelled   08/19/18  17:27    


 


Monocytes # (Manual)  Cancelled   08/19/18  17:27    


 


Eosinophils # (Manual)  Cancelled   08/19/18  17:27    


 


Basophils # (Manual)  Cancelled   08/19/18  17:27    


 


Metamyelocytes #  Cancelled   08/19/18  17:27    


 


Myelocytes #  Cancelled   08/19/18  17:27    


 


Promyelocytes #  Cancelled   08/19/18  17:27    


 


Blast Cells #  Cancelled   08/19/18  17:27    


 


WBC Morphology  Cancelled   08/19/18  17:27    


 


Hypersegmented Neuts  Cancelled   08/19/18  17:27    


 


Hyposegmented Neuts  Cancelled   08/19/18  17:27    


 


Hypogranular Neuts  Cancelled   08/19/18  17:27    


 


Hypersegmented Polys  Cancelled   08/19/18  17:27    


 


Smudge Cells  Cancelled   08/19/18  17:27    


 


Toxic Granulation  Cancelled   08/19/18  17:27    


 


Toxic Vacuolation  Cancelled   08/19/18  17:27    


 


Dohle Bodies  Cancelled   08/19/18  17:27    


 


Pelger-Huet Anomaly  Cancelled   08/19/18  17:27    


 


Phillip Rods  Cancelled   08/19/18  17:27    


 


Platelet Estimate  Cancelled   08/19/18  17:27    


 


Clumped Platelets  Cancelled   08/19/18  17:27    


 


Plt Clumps, EDTA  Cancelled   08/19/18  17:27    


 


Large Platelets  Cancelled   08/19/18  17:27    


 


Giant Platelets  Cancelled   08/19/18  17:27    


 


Platelet Satelliting  Cancelled   08/19/18  17:27    


 


Plt Morphology Comment  Cancelled   08/19/18  17:27    


 


RBC Morphology  Cancelled   08/19/18  17:27    


 


Dimorphic RBCs  Cancelled   08/19/18  17:27    


 


Polychromasia  Cancelled   08/19/18  17:27    


 


Hypochromasia  Cancelled   08/19/18  17:27    


 


Poikilocytosis  Cancelled   08/19/18  17:27    


 


Basophilic Stippling  Cancelled   08/19/18  17:27    


 


Anisocytosis  Cancelled   08/19/18  17:27    


 


Microcytosis  Cancelled   08/19/18  17:27    


 


Macrocytosis  Cancelled   08/19/18  17:27    


 


Spherocytes  Cancelled   08/19/18  17:27    


 


Pappenheimer Bodies  Cancelled   08/19/18  17:27    


 


Sickle Cells  Cancelled   08/19/18  17:27    


 


Target Cells  Cancelled   08/19/18  17:27    


 


Tear Drop Cells  Cancelled   08/19/18  17:27    


 


Ovalocytes  Cancelled   08/19/18  17:27    


 


Stomatocytes  Cancelled   08/19/18  17:27    


 


Helmet Cells  Cancelled   08/19/18  17:27    


 


Burns-Jolly Bodies  Cancelled   08/19/18  17:27    


 


Cabot Rings  Cancelled   08/19/18  17:27    


 


Tamy Cells  Cancelled   08/19/18  17:27    


 


Bite Cells  Cancelled   08/19/18  17:27    


 


Crenated Cell  Cancelled   08/19/18  17:27    


 


Elliptocytes  Cancelled   08/19/18  17:27    


 


Acanthocytes (Spur)  Cancelled   08/19/18  17:27    


 


Rouleaux  Cancelled   08/19/18  17:27    


 


Hemoglobin C Crystals  Cancelled   08/19/18  17:27    


 


Schistocytes  Cancelled   08/19/18  17:27    


 


Malaria parasites  Cancelled   08/19/18  17:27    


 


Abhishek Bodies  Cancelled   08/19/18  17:27    


 


Hem Pathologist Commnt  Cancelled   08/19/18  17:27    


 


Sodium  141 mmol/L (137-145)   08/19/18  17:27    


 


Potassium  3.8 mmol/L (3.6-5.0)   08/19/18  17:27    


 


Chloride  99.9 mmol/L ()   08/19/18  17:27    


 


Carbon Dioxide  28 mmol/L (22-30)   08/19/18  17:27    


 


Anion Gap  17 mmol/L  08/19/18  17:27    


 


BUN  13 mg/dL (9-20)   08/19/18  17:27    


 


Creatinine  0.8 mg/dL (0.8-1.5)   08/19/18  17:27    


 


Estimated GFR  > 60 ml/min  08/19/18  17:27    


 


BUN/Creatinine Ratio  16 %  08/19/18  17:27    


 


Glucose  148 mg/dL ()  H  08/19/18  17:27    


 


POC Glucose  140  ()  H  08/19/18  18:07    


 


Calcium  9.1 mg/dL (8.4-10.2)   08/19/18  17:27    


 


Magnesium  2.20 mg/dL (1.7-2.3)   08/19/18  17:24    


 


Total Bilirubin  1.20 mg/dL (0.1-1.2)   08/19/18  17:27    


 


AST  50 units/L (5-40)  H  08/19/18  17:27    


 


ALT  63 units/L (7-56)  H  08/19/18  17:27    


 


Alkaline Phosphatase  67 units/L ()   08/19/18  17:27    


 


Ammonia  17.0 umol/L (25-60)  L  08/19/18  17:27    


 


Total Creatine Kinase  148 units/L ()   08/19/18  17:44    


 


CK-MB (CK-2)  1.6 ng/mL (0.0-4.0)   08/19/18  17:44    


 


CK-MB (CK-2) Rel Index  1.0  (0-4)   08/19/18  17:44    


 


Troponin T  < 0.010 ng/mL (0.00-0.029)   08/19/18  17:43    


 


Total Protein  7.2 g/dL (6.3-8.2)   08/19/18  17:27    


 


Albumin  3.6 g/dL (3.9-5)  L  08/19/18  17:27    


 


Albumin/Globulin Ratio  1.0 %  08/19/18  17:27    


 


TSH  3.030 mlU/mL (0.270-4.200)   08/19/18  17:27    


 


Free T4  0.92 ng/dL (0.76-1.46)   08/19/18  17:27    


 


Urine Color  Shamika  (Yellow)   08/19/18  17:59    


 


Urine Turbidity  Cloudy  (Clear)   08/19/18  17:59    


 


Urine pH  7.0  (5.0-7.0)   08/19/18  17:59    


 


Ur Specific Gravity  1.021  (1.003-1.030)   08/19/18  17:59    


 


Urine Protein  30 mg/dl mg/dL (Negative)   08/19/18  17:59    


 


Urine Glucose (UA)  Neg mg/dL (Negative)   08/19/18  17:59    


 


Urine Ketones  Neg mg/dL (Negative)   08/19/18  17:59    


 


Urine Blood  Neg  (Negative)   08/19/18  17:59    


 


Urine Nitrite  Neg  (Negative)   08/19/18  17:59    


 


Urine Bilirubin  Neg  (Negative)   08/19/18  17:59    


 


Urine Urobilinogen  4.0 mg/dL (<2.0)   08/19/18  17:59    


 


Ur Leukocyte Esterase  Lg  (Negative)   08/19/18  17:59    


 


Urine WBC (Auto)  92.0 /HPF (0.0-6.0)  H  08/19/18  17:59    


 


Urine RBC (Auto)  6.0 /HPF (0.0-6.0)   08/19/18  17:59    


 


U Epithel Cells (Auto)  < 1.0 /HPF (0-13.0)   08/19/18  17:59    


 


Urine Bacteria (Auto)  3+ /HPF (Negative)   08/19/18  17:59    


 


Ur Transition Epith Cell  2 /HPF  08/19/18  17:59    


 


Urine Mucus  1+ /HPF  08/19/18  17:59    


 


Salicylates  < 0.3 mg/dL (2.8-20.0)  L  08/19/18  17:27    


 


Urine Opiates Screen  Presumptive negative   08/19/18  17:59    


 


Urine Methadone Screen  Presumptive negative   08/19/18  17:59    


 


Acetaminophen  < 5.0 ug/mL (10.0-30.0)  L  08/19/18  17:27    


 


Ur Barbiturates Screen  Presumptive negative   08/19/18  17:59    


 


Ur Phencyclidine Scrn  Presumptive negative   08/19/18  17:59    


 


Ur Amphetamines Screen  Presumptive negative   08/19/18  17:59    


 


U Benzodiazepines Scrn  Presumptive negative   08/19/18  17:59    


 


Urine Cocaine Screen  Presumptive negative   08/19/18  17:59    


 


U Marijuana (THC) Screen  Presumptive negative   08/19/18  17:59    


 


Drugs of Abuse Note  Disclamer   08/19/18  17:59    


 


Plasma/Serum Alcohol  < 0.01 % (0-0.07)   08/19/18  17:27

## 2018-08-20 NOTE — CONSULTATION
History of Present Illness





- [facility]


Consult date: 08/20/18


Consult reason: other


History of present illness: 





58-year-old male with a past medical history schizophrenia, depression, and 

anxiety presents to the hospital with his family members for alteration in 

mental status.  The patient reportedly fell from a wheelchair recently x-rays 

taken in the emergency room reveal a possible fracture involving the lesser 

trochanter on the right femur





Past History


Past Medical History: denies: other (Schizophrenia, Depression, Anxiety)


Past Surgical History: No surgical history, Other (reviewed)


Social history: single.  denies: smoking, alcohol abuse, prescription drug abuse


Family history: no significant family history (reviewed)





Medications and Allergies


 Allergies











Allergy/AdvReac Type Severity Reaction Status Date / Time


 


No Known Allergies Allergy   Unverified 08/19/18 17:07











Active Meds: 


Active Medications





Dextrose/Sodium Chloride (D5/0.45ns)  1,000 mls @ 100 mls/hr IV AS DIRECT AISHWARYA


Lorazepam (Ativan)  1 mg IM Q8HR AISHWARYA


   Last Admin: 08/20/18 06:36 Dose:  Not Given











Physical Examination





- Physical exam


Narrative exam: 





The patient is still in a catatonic state and unable to answer questions and 

participate in his physical examination





Plain x-rays of the hip and pelvis were reviewed by me and show no obvious 

acute injury to the patient's pelvis or proximal femur that doesn't appear to 

be some old calcified fragment in the soft tissues

## 2018-08-21 LAB
BUN SERPL-MCNC: 8 MG/DL (ref 9–20)
BUN/CREAT SERPL: 13 %
CALCIUM SERPL-MCNC: 8.4 MG/DL (ref 8.4–10.2)
HEMOLYSIS INDEX: 7

## 2018-08-21 RX ADMIN — DEXTROSE AND SODIUM CHLORIDE SCH MLS/HR: 5; .45 INJECTION, SOLUTION INTRAVENOUS at 13:19

## 2018-08-21 RX ADMIN — CEFTRIAXONE SODIUM SCH MLS/HR: 1 INJECTION, POWDER, FOR SOLUTION INTRAMUSCULAR; INTRAVENOUS at 10:00

## 2018-08-21 RX ADMIN — HEPARIN SODIUM SCH UNIT: 5000 INJECTION, SOLUTION INTRAVENOUS; SUBCUTANEOUS at 22:28

## 2018-08-21 RX ADMIN — HEPARIN SODIUM SCH UNIT: 5000 INJECTION, SOLUTION INTRAVENOUS; SUBCUTANEOUS at 10:00

## 2018-08-21 RX ADMIN — LORAZEPAM SCH: 2 INJECTION INTRAMUSCULAR; INTRAVENOUS at 13:21

## 2018-08-21 RX ADMIN — LORAZEPAM SCH MG: 2 INJECTION INTRAMUSCULAR; INTRAVENOUS at 13:22

## 2018-08-21 RX ADMIN — DEXTROSE AND SODIUM CHLORIDE SCH MLS/HR: 5; .45 INJECTION, SOLUTION INTRAVENOUS at 22:30

## 2018-08-21 RX ADMIN — LORAZEPAM SCH MG: 2 INJECTION INTRAMUSCULAR; INTRAVENOUS at 22:27

## 2018-08-21 NOTE — CONSULTATION
History of Present Illness





- Reason for Consult


Consult date: 08/21/18


Reason for consult: Mental Arsenio Evaluation


Requesting physician: JONA AGUSTIN





- Chief Complaint


Chief complaint: 


"The patient is nonverbal"








- History of Present Psychiatric Illness


58-year-old AA male presenting to the ER for AMS from [place]. The 

psychiatry team was consulted to see patient for catatonic like state. Today 

the patient is nonverbal during the assessment. He does not respond to commands

, but grimace to the sternum rub. Per the record, the patient was given an 

[place] IM injection at [facility]. As this time the date of this injection is 

unknown. During the exam, the patient jerked for 1 to 2 seconds (unsure if this 

was voluntary or involuntary). Also, the patient presents with mild clonus 

bilaterally (feet/ankles).  





CN1 - unable to assess


CN2 - unable to assess


CN3,4,6 - eye movement intact 


CN5 - unable to assess


CN7 - the patient grimaces


CN8 - he responds to some commands when asked


CN9,10 - unable to assess


CN11 - unable to assess


CN12 - unable to assess


Motor - generalized resistance in all extremities 


Reflexes - mild hyperreflexia upper extremities


Sensory - unable to assess, lack cooperation from the patient





Medications and Allergies


 Allergies











Allergy/AdvReac Type Severity Reaction Status Date / Time


 


No Known Allergies Allergy   Unverified 08/19/18 17:07











Active Meds: 


Active Medications





Heparin Sodium (Porcine) (Heparin)  5,000 unit SUB-Q Q12HR AISHWARYA


   Last Admin: 08/21/18 10:00 Dose:  5,000 unit


Hydrophilic Ointment (Vaseline Lip Therapy)  1 applic TP AS DIRECT PRN


   PRN Reason: Dry Lips


   Last Admin: 08/21/18 13:21 Dose:  1 applic


Dextrose/Sodium Chloride (D5/0.45ns)  1,000 mls @ 100 mls/hr IV AS DIRECT AISHWARYA


   Last Admin: 08/21/18 13:19 Dose:  100 mls/hr


Ceftriaxone Sodium (Rocephin/Ns 1 Gm/50 Ml)  1 gm in 50 mls @ 100 mls/hr IV 

Q24HR AISHWARYA; Protocol


   Last Admin: 08/21/18 10:00 Dose:  100 mls/hr


Lorazepam (Ativan)  1 mg IM Q8HR AISHWARYA


   Last Admin: 08/21/18 13:22 Dose:  1 mg











Past psychiatric history





- Past Medical History


Past Medical History: other (Unable to obtain)


Past Surgical History: Other (Unable to obtain)





- past Psychiatric treatment and history


psychiatric treatment history: 


Per the record, the patient was transferred from [place]. Unable to obtain 

an fam psy hx.








- Social History


Social history: other (Resides with family per the record)





Mental Status Exam





- Vital signs


 Last Vital Signs











Temp  98.6 F   08/21/18 11:21


 


Pulse  89   08/21/18 11:21


 


Resp  14   08/21/18 11:21


 


BP  108/69   08/21/18 11:21


 


Pulse Ox  96   08/21/18 11:21














- Exam


Narrative exam: 


Unable to complete the MSE because of the patient's condition.








Results


Result Diagrams: 


 08/19/18 17:27





 08/21/18 05:17


 Abnormal lab results











  08/21/18 Range/Units





  05:17 


 


Carbon Dioxide  21 L D  (22-30)  mmol/L


 


BUN  8 L  (9-20)  mg/dL


 


Creatinine  0.6 L  (0.8-1.5)  mg/dL


 


Glucose  138 H  ()  mg/dL








All other labs normal.








Assessment and Plan


Assessment and plan: 


Impression: R/O Catatonia. Today the patient is nonverbal during the 

assessment. Plt 81. WBC Urine 92. The patient's VS are stable.





Recommendation/Plan: Continue 1013 and gather collateral information to help 

determine proper treatment and dispo. Continue Ativan 1 mg IM Q8hrs for 

catatonia. Recommend neuro consult, GI Prophylaxis, and continue hydration. 

Monitor patient's V/S and airway. Will follow up with patient daily to R/O NMS 

and Serotonin Syndrome.

## 2018-08-21 NOTE — PROGRESS NOTE
Assessment and Plan


Assessment and plan: 





58-year-old -American male came from mental health facility for altered 

mental status


Catatonic schizophrenia


- mental health consulted


Metabolic encephalopathy


- Supportive care


UTI


-Patient is on IV ceftriaxone


Dehydration


- Patient is on D5 half-normal saline, condition is not taking anything by mouth


Suspected femoral fracture


-Evaluated by orthopedics, reviewed his imaging and showed no fracture


DVT prophylaxis


Disposition


- Continue inpatient care


- Disposition per psychiatry





History


Interval history: 





Patient was seen and evaluated this morning, patient did not talk to me, 

patient is in catatonic state.





Hospitalist Physical





- Physical exam


Narrative exam: 


 Not in cardiopulmonary distress. 


 The patient appeared well nourished and normally developed.


 Vital signs as documented.


 Head exam is unremarkable.


 No scleral icterus .


 Neck is without jugular venous distension, thyromegaly, or carotid bruits. 


 Lungs are clear to auscultation.


Cardiac exam reveals regular rate and  Rhythm. First and second heart sounds 

normal. No murmurs, rubs or gallops. 


Abdominal exam reveals normal bowel sounds, no masses, no organomegaly and no 

aortic enlargement. 


Extremities are nonedematous and both femoral and pedal pulses are normal.


CNS: Patient didn't talk to me.  Patient is very stiff.





- Constitutional


Vitals: 


 











Temp Pulse Resp BP Pulse Ox


 


 97.5 F L  89   20   107/68   98 


 


 08/21/18 04:21  08/21/18 04:21  08/21/18 04:21  08/21/18 04:21  08/21/18 04:21











General appearance: Present: no acute distress





Results





- Labs


CBC & Chem 7: 


 08/19/18 17:27





 08/21/18 05:17


Labs: 


 Laboratory Last Values











WBC  7.3 K/mm3 (4.5-11.0)   08/19/18  17:27    


 


RBC  5.19 M/mm3 (3.65-5.03)  H  08/19/18  17:27    


 


Hgb  14.3 gm/dl (11.8-15.2)   08/19/18  17:27    


 


Hct  43.2 % (35.5-45.6)   08/19/18  17:27    


 


MCV  83 fl (84-94)  L  08/19/18  17:27    


 


MCH  28 pg (28-32)   08/19/18  17:27    


 


MCHC  33 % (32-34)   08/19/18  17:27    


 


RDW  14.9 % (13.2-15.2)   08/19/18  17:27    


 


Plt Count  81 K/mm3 (140-440)  L  08/19/18  17:27    


 


Lymph % (Auto)  7.4 % (13.4-35.0)  L  08/19/18  17:27    


 


Mono % (Auto)  5.8 % (0.0-7.3)   08/19/18  17:27    


 


Eos % (Auto)  0.8 % (0.0-4.3)   08/19/18  17:27    


 


Baso % (Auto)  0.3 % (0.0-1.8)   08/19/18  17:27    


 


Lymph #  0.5 K/mm3 (1.2-5.4)  L  08/19/18  17:27    


 


Mono #  0.4 K/mm3 (0.0-0.8)   08/19/18  17:27    


 


Eos #  0.1 K/mm3 (0.0-0.4)   08/19/18  17:27    


 


Baso #  0.0 K/mm3 (0.0-0.1)   08/19/18  17:27    


 


Total Counted  Cancelled   08/19/18  17:27    


 


Seg Neutrophils %  85.7 % (40.0-70.0)  H  08/19/18  17:27    


 


Seg Neuts % (Manual)  Cancelled   08/19/18  17:27    


 


Band Neutrophils %  Cancelled   08/19/18  17:27    


 


Lymphocytes % (Manual)  Cancelled   08/19/18  17:27    


 


Reactive Lymphs % (Man)  Cancelled   08/19/18  17:27    


 


Monocytes % (Manual)  Cancelled   08/19/18  17:27    


 


Eosinophils % (Manual)  Cancelled   08/19/18  17:27    


 


Basophils % (Manual)  Cancelled   08/19/18  17:27    


 


Metamyelocytes %  Cancelled   08/19/18  17:27    


 


Myelocytes %  Cancelled   08/19/18  17:27    


 


Promyelocytes %  Cancelled   08/19/18  17:27    


 


Blast Cells %  Cancelled   08/19/18  17:27    


 


Nucleated RBC %  Cancelled   08/19/18  17:27    


 


Seg Neutrophils #  6.2 K/mm3 (1.8-7.7)   08/19/18  17:27    


 


Seg Neutrophils # Man  Cancelled   08/19/18  17:27    


 


Band Neutrophils #  Cancelled   08/19/18  17:27    


 


Lymphocytes # (Manual)  Cancelled   08/19/18  17:27    


 


Abs React Lymphs (Man)  Cancelled   08/19/18  17:27    


 


Monocytes # (Manual)  Cancelled   08/19/18  17:27    


 


Eosinophils # (Manual)  Cancelled   08/19/18  17:27    


 


Basophils # (Manual)  Cancelled   08/19/18  17:27    


 


Metamyelocytes #  Cancelled   08/19/18  17:27    


 


Myelocytes #  Cancelled   08/19/18  17:27    


 


Promyelocytes #  Cancelled   08/19/18  17:27    


 


Blast Cells #  Cancelled   08/19/18  17:27    


 


WBC Morphology  Cancelled   08/19/18  17:27    


 


Hypersegmented Neuts  Cancelled   08/19/18  17:27    


 


Hyposegmented Neuts  Cancelled   08/19/18  17:27    


 


Hypogranular Neuts  Cancelled   08/19/18  17:27    


 


Hypersegmented Polys  Cancelled   08/19/18  17:27    


 


Smudge Cells  Cancelled   08/19/18  17:27    


 


Toxic Granulation  Cancelled   08/19/18  17:27    


 


Toxic Vacuolation  Cancelled   08/19/18  17:27    


 


Dohle Bodies  Cancelled   08/19/18  17:27    


 


Pelger-Huet Anomaly  Cancelled   08/19/18  17:27    


 


Phillip Rods  Cancelled   08/19/18  17:27    


 


Platelet Estimate  Cancelled   08/19/18  17:27    


 


Clumped Platelets  Cancelled   08/19/18  17:27    


 


Plt Clumps, EDTA  Cancelled   08/19/18  17:27    


 


Large Platelets  Cancelled   08/19/18  17:27    


 


Giant Platelets  Cancelled   08/19/18  17:27    


 


Platelet Satelliting  Cancelled   08/19/18  17:27    


 


Plt Morphology Comment  Cancelled   08/19/18  17:27    


 


RBC Morphology  Cancelled   08/19/18  17:27    


 


Dimorphic RBCs  Cancelled   08/19/18  17:27    


 


Polychromasia  Cancelled   08/19/18  17:27    


 


Hypochromasia  Cancelled   08/19/18  17:27    


 


Poikilocytosis  Cancelled   08/19/18  17:27    


 


Basophilic Stippling  Cancelled   08/19/18  17:27    


 


Anisocytosis  Cancelled   08/19/18  17:27    


 


Microcytosis  Cancelled   08/19/18  17:27    


 


Macrocytosis  Cancelled   08/19/18  17:27    


 


Spherocytes  Cancelled   08/19/18  17:27    


 


Pappenheimer Bodies  Cancelled   08/19/18  17:27    


 


Sickle Cells  Cancelled   08/19/18  17:27    


 


Target Cells  Cancelled   08/19/18  17:27    


 


Tear Drop Cells  Cancelled   08/19/18  17:27    


 


Ovalocytes  Cancelled   08/19/18  17:27    


 


Stomatocytes  Cancelled   08/19/18  17:27    


 


Helmet Cells  Cancelled   08/19/18  17:27    


 


Burns-Jolly Bodies  Cancelled   08/19/18  17:27    


 


Cabot Rings  Cancelled   08/19/18  17:27    


 


[place] Cells  Cancelled   08/19/18  17:27    


 


Bite Cells  Cancelled   08/19/18  17:27    


 


Crenated Cell  Cancelled   08/19/18  17:27    


 


Elliptocytes  Cancelled   08/19/18  17:27    


 


Acanthocytes (Spur)  Cancelled   08/19/18  17:27    


 


Rouleaux  Cancelled   08/19/18  17:27    


 


Hemoglobin C Crystals  Cancelled   08/19/18  17:27    


 


Schistocytes  Cancelled   08/19/18  17:27    


 


Malaria parasites  Cancelled   08/19/18  17:27    


 


Abhishek Bodies  Cancelled   08/19/18  17:27    


 


Hem Pathologist Commnt  Cancelled   08/19/18  17:27    


 


Sodium  139 mmol/L (137-145)   08/21/18  05:17    


 


Potassium  3.6 mmol/L (3.6-5.0)   08/21/18  05:17    


 


Chloride  104.3 mmol/L ()   08/21/18  05:17    


 


Carbon Dioxide  21 mmol/L (22-30)  L D 08/21/18  05:17    


 


Anion Gap  17 mmol/L  08/21/18  05:17    


 


BUN  8 mg/dL (9-20)  L  08/21/18  05:17    


 


Creatinine  0.6 mg/dL (0.8-1.5)  L  08/21/18  05:17    


 


Estimated GFR  > 60 ml/min  08/21/18  05:17    


 


BUN/Creatinine Ratio  13 %  08/21/18  05:17    


 


Glucose  138 mg/dL ()  H  08/21/18  05:17    


 


POC Glucose  140  ()  H  08/19/18  18:07    


 


Calcium  8.4 mg/dL (8.4-10.2)   08/21/18  05:17    


 


Magnesium  2.20 mg/dL (1.7-2.3)   08/19/18  17:24    


 


Total Bilirubin  1.20 mg/dL (0.1-1.2)   08/19/18  17:27    


 


AST  50 units/L (5-40)  H  08/19/18  17:27    


 


ALT  63 units/L (7-56)  H  08/19/18  17:27    


 


Alkaline Phosphatase  67 units/L ()   08/19/18  17:27    


 


Ammonia  17.0 umol/L (25-60)  L  08/19/18  17:27    


 


Total Creatine Kinase  148 units/L ()   08/19/18  17:44    


 


CK-MB (CK-2)  1.6 ng/mL (0.0-4.0)   08/19/18  17:44    


 


CK-MB (CK-2) Rel Index  1.0  (0-4)   08/19/18  17:44    


 


Troponin T  < 0.010 ng/mL (0.00-0.029)   08/19/18  17:43    


 


Total Protein  7.2 g/dL (6.3-8.2)   08/19/18  17:27    


 


Albumin  3.6 g/dL (3.9-5)  L  08/19/18  17:27    


 


Albumin/Globulin Ratio  1.0 %  08/19/18  17:27    


 


TSH  3.030 mlU/mL (0.270-4.200)   08/19/18  17:27    


 


Free T4  0.92 ng/dL (0.76-1.46)   08/19/18  17:27    


 


Urine Color  Shamika  (Yellow)   08/19/18  17:59    


 


Urine Turbidity  Cloudy  (Clear)   08/19/18  17:59    


 


Urine pH  7.0  (5.0-7.0)   08/19/18  17:59    


 


Ur Specific Gravity  1.021  (1.003-1.030)   08/19/18  17:59    


 


Urine Protein  30 mg/dl mg/dL (Negative)   08/19/18  17:59    


 


Urine Glucose (UA)  Neg mg/dL (Negative)   08/19/18  17:59    


 


Urine Ketones  Neg mg/dL (Negative)   08/19/18  17:59    


 


Urine Blood  Neg  (Negative)   08/19/18  17:59    


 


Urine Nitrite  Neg  (Negative)   08/19/18  17:59    


 


Urine Bilirubin  Neg  (Negative)   08/19/18  17:59    


 


Urine Urobilinogen  4.0 mg/dL (<2.0)   08/19/18  17:59    


 


Ur Leukocyte Esterase  Lg  (Negative)   08/19/18  17:59    


 


Urine WBC (Auto)  92.0 /HPF (0.0-6.0)  H  08/19/18  17:59    


 


Urine RBC (Auto)  6.0 /HPF (0.0-6.0)   08/19/18  17:59    


 


U Epithel Cells (Auto)  < 1.0 /HPF (0-13.0)   08/19/18  17:59    


 


Urine Bacteria (Auto)  3+ /HPF (Negative)   08/19/18  17:59    


 


Ur Transition Epith Cell  2 /HPF  08/19/18  17:59    


 


Urine Mucus  1+ /HPF  08/19/18  17:59    


 


Salicylates  < 0.3 mg/dL (2.8-20.0)  L  08/19/18  17:27    


 


Urine Opiates Screen  Presumptive negative   08/19/18  17:59    


 


Urine Methadone Screen  Presumptive negative   08/19/18  17:59    


 


Acetaminophen  < 5.0 ug/mL (10.0-30.0)  L  08/19/18  17:27    


 


Ur Barbiturates Screen  Presumptive negative   08/19/18  17:59    


 


Ur Phencyclidine Scrn  Presumptive negative   08/19/18  17:59    


 


Ur Amphetamines Screen  Presumptive negative   08/19/18  17:59    


 


U Benzodiazepines Scrn  Presumptive negative   08/19/18  17:59    


 


Urine Cocaine Screen  Presumptive negative   08/19/18  17:59    


 


U Marijuana (THC) Screen  Presumptive negative   08/19/18  17:59    


 


Drugs of Abuse Note  Disclamer   08/19/18  17:59    


 


Plasma/Serum Alcohol  < 0.01 % (0-0.07)   08/19/18  17:27

## 2018-08-22 RX ADMIN — HEPARIN SODIUM SCH UNIT: 5000 INJECTION, SOLUTION INTRAVENOUS; SUBCUTANEOUS at 21:29

## 2018-08-22 RX ADMIN — LORAZEPAM SCH MG: 2 INJECTION INTRAMUSCULAR; INTRAVENOUS at 13:14

## 2018-08-22 RX ADMIN — DEXTROSE AND SODIUM CHLORIDE SCH MLS/HR: 5; .45 INJECTION, SOLUTION INTRAVENOUS at 19:33

## 2018-08-22 RX ADMIN — CEFTRIAXONE SODIUM SCH MLS/HR: 1 INJECTION, POWDER, FOR SOLUTION INTRAMUSCULAR; INTRAVENOUS at 09:53

## 2018-08-22 RX ADMIN — LORAZEPAM SCH MG: 2 INJECTION INTRAMUSCULAR; INTRAVENOUS at 06:26

## 2018-08-22 RX ADMIN — DEXTROSE AND SODIUM CHLORIDE SCH MLS/HR: 5; .45 INJECTION, SOLUTION INTRAVENOUS at 07:53

## 2018-08-22 RX ADMIN — HEPARIN SODIUM SCH UNIT: 5000 INJECTION, SOLUTION INTRAVENOUS; SUBCUTANEOUS at 09:53

## 2018-08-22 RX ADMIN — LORAZEPAM SCH MG: 2 INJECTION INTRAMUSCULAR; INTRAVENOUS at 21:28

## 2018-08-22 NOTE — PROGRESS NOTE
Assessment and Plan


Assessment and plan: 





Catatonia


- mental health following.  Continue Ativan 1 mg every 8 hours.





Metabolic encephalopathy


- Supportive care





UTI


-Continue IV antibiotics and await sensitivities.  Urine culture reveals gram-

negative marin.  Follow-up blood cultures.





Dehydration


- Patient is on D5 half-normal saline, condition is not taking anything by mouth





Suspected femoral fracture


-Evaluated by orthopedics, reviewed his imaging and showed no fracture





DVT prophylaxis








History


Interval history: 





No new issues overnight.





Hospitalist Physical





- Constitutional


Vitals: 


 











Temp Pulse Resp BP Pulse Ox


 


 97.9 F   77   18   115/75   98 


 


 08/22/18 05:22  08/21/18 17:44  08/22/18 05:22  08/22/18 05:22  08/21/18 17:44











General appearance: Present: no acute distress, other (catatonic)





- EENT


Eyes: Present: PERRL, EOM intact


ENT: hearing intact, clear oral mucosa, dentition normal





- Neck


Neck: Present: supple, normal ROM





- Respiratory


Respiratory effort: normal


Respiratory: bilateral: CTA





- Cardiovascular


Rhythm: regular


Heart Sounds: Present: S1 & S2.  Absent: gallop, rub





- Extremities


Extremities: no ischemia, No edema, Full ROM





- Abdominal


General gastrointestinal: soft, non-tender, non-distended, normal bowel sounds





- Integumentary


Integumentary: Present: clear, warm, dry





- Neurologic


Neurologic: CNII-XII intact, moves all extremities





Results





- Labs


CBC & Chem 7: 


 08/19/18 17:27





 08/21/18 05:17


Labs: 


 Laboratory Last Values











WBC  7.3 K/mm3 (4.5-11.0)   08/19/18  17:27    


 


RBC  5.19 M/mm3 (3.65-5.03)  H  08/19/18  17:27    


 


Hgb  14.3 gm/dl (11.8-15.2)   08/19/18  17:27    


 


Hct  43.2 % (35.5-45.6)   08/19/18  17:27    


 


MCV  83 fl (84-94)  L  08/19/18  17:27    


 


MCH  28 pg (28-32)   08/19/18  17:27    


 


MCHC  33 % (32-34)   08/19/18  17:27    


 


RDW  14.9 % (13.2-15.2)   08/19/18  17:27    


 


Plt Count  81 K/mm3 (140-440)  L  08/19/18  17:27    


 


Lymph % (Auto)  7.4 % (13.4-35.0)  L  08/19/18  17:27    


 


Mono % (Auto)  5.8 % (0.0-7.3)   08/19/18  17:27    


 


Eos % (Auto)  0.8 % (0.0-4.3)   08/19/18  17:27    


 


Baso % (Auto)  0.3 % (0.0-1.8)   08/19/18  17:27    


 


Lymph #  0.5 K/mm3 (1.2-5.4)  L  08/19/18  17:27    


 


Mono #  0.4 K/mm3 (0.0-0.8)   08/19/18  17:27    


 


Eos #  0.1 K/mm3 (0.0-0.4)   08/19/18  17:27    


 


Baso #  0.0 K/mm3 (0.0-0.1)   08/19/18  17:27    


 


Total Counted  Cancelled   08/19/18  17:27    


 


Seg Neutrophils %  85.7 % (40.0-70.0)  H  08/19/18  17:27    


 


Seg Neuts % (Manual)  Cancelled   08/19/18  17:27    


 


Band Neutrophils %  Cancelled   08/19/18  17:27    


 


Lymphocytes % (Manual)  Cancelled   08/19/18  17:27    


 


Reactive Lymphs % (Man)  Cancelled   08/19/18  17:27    


 


Monocytes % (Manual)  Cancelled   08/19/18  17:27    


 


Eosinophils % (Manual)  Cancelled   08/19/18  17:27    


 


Basophils % (Manual)  Cancelled   08/19/18  17:27    


 


Metamyelocytes %  Cancelled   08/19/18  17:27    


 


Myelocytes %  Cancelled   08/19/18  17:27    


 


Promyelocytes %  Cancelled   08/19/18  17:27    


 


Blast Cells %  Cancelled   08/19/18  17:27    


 


Nucleated RBC %  Cancelled   08/19/18  17:27    


 


Seg Neutrophils #  6.2 K/mm3 (1.8-7.7)   08/19/18  17:27    


 


Seg Neutrophils # Man  Cancelled   08/19/18  17:27    


 


Band Neutrophils #  Cancelled   08/19/18  17:27    


 


Lymphocytes # (Manual)  Cancelled   08/19/18  17:27    


 


Abs React Lymphs (Man)  Cancelled   08/19/18  17:27    


 


Monocytes # (Manual)  Cancelled   08/19/18  17:27    


 


Eosinophils # (Manual)  Cancelled   08/19/18  17:27    


 


Basophils # (Manual)  Cancelled   08/19/18  17:27    


 


Metamyelocytes #  Cancelled   08/19/18  17:27    


 


Myelocytes #  Cancelled   08/19/18  17:27    


 


Promyelocytes #  Cancelled   08/19/18  17:27    


 


Blast Cells #  Cancelled   08/19/18  17:27    


 


WBC Morphology  Cancelled   08/19/18  17:27    


 


Hypersegmented Neuts  Cancelled   08/19/18  17:27    


 


Hyposegmented Neuts  Cancelled   08/19/18  17:27    


 


Hypogranular Neuts  Cancelled   08/19/18  17:27    


 


Hypersegmented Polys  Cancelled   08/19/18  17:27    


 


Smudge Cells  Cancelled   08/19/18  17:27    


 


Toxic Granulation  Cancelled   08/19/18  17:27    


 


Toxic Vacuolation  Cancelled   08/19/18  17:27    


 


Dohle Bodies  Cancelled   08/19/18  17:27    


 


Pelger-Huet Anomaly  Cancelled   08/19/18  17:27    


 


Phillip Rods  Cancelled   08/19/18  17:27    


 


Platelet Estimate  Cancelled   08/19/18  17:27    


 


Clumped Platelets  Cancelled   08/19/18  17:27    


 


Plt Clumps, EDTA  Cancelled   08/19/18  17:27    


 


Large Platelets  Cancelled   08/19/18  17:27    


 


Giant Platelets  Cancelled   08/19/18  17:27    


 


Platelet Satelliting  Cancelled   08/19/18  17:27    


 


Plt Morphology Comment  Cancelled   08/19/18  17:27    


 


RBC Morphology  Cancelled   08/19/18  17:27    


 


Dimorphic RBCs  Cancelled   08/19/18  17:27    


 


Polychromasia  Cancelled   08/19/18  17:27    


 


Hypochromasia  Cancelled   08/19/18  17:27    


 


Poikilocytosis  Cancelled   08/19/18  17:27    


 


Basophilic Stippling  Cancelled   08/19/18  17:27    


 


Anisocytosis  Cancelled   08/19/18  17:27    


 


Microcytosis  Cancelled   08/19/18  17:27    


 


Macrocytosis  Cancelled   08/19/18  17:27    


 


Spherocytes  Cancelled   08/19/18  17:27    


 


Pappenheimer Bodies  Cancelled   08/19/18  17:27    


 


Sickle Cells  Cancelled   08/19/18  17:27    


 


Target Cells  Cancelled   08/19/18  17:27    


 


Tear Drop Cells  Cancelled   08/19/18  17:27    


 


Ovalocytes  Cancelled   08/19/18  17:27    


 


Stomatocytes  Cancelled   08/19/18  17:27    


 


Helmet Cells  Cancelled   08/19/18  17:27    


 


Burns-Jolly Bodies  Cancelled   08/19/18  17:27    


 


Cabot Rings  Cancelled   08/19/18  17:27    


 


[place] Cells  Cancelled   08/19/18  17:27    


 


Bite Cells  Cancelled   08/19/18  17:27    


 


Crenated Cell  Cancelled   08/19/18  17:27    


 


Elliptocytes  Cancelled   08/19/18  17:27    


 


Acanthocytes (Spur)  Cancelled   08/19/18  17:27    


 


Rouleaux  Cancelled   08/19/18  17:27    


 


Hemoglobin C Crystals  Cancelled   08/19/18  17:27    


 


Schistocytes  Cancelled   08/19/18  17:27    


 


Malaria parasites  Cancelled   08/19/18  17:27    


 


Abhishek Bodies  Cancelled   08/19/18  17:27    


 


Hem Pathologist Commnt  Cancelled   08/19/18  17:27    


 


Sodium  139 mmol/L (137-145)   08/21/18  05:17    


 


Potassium  3.6 mmol/L (3.6-5.0)   08/21/18  05:17    


 


Chloride  104.3 mmol/L ()   08/21/18  05:17    


 


Carbon Dioxide  21 mmol/L (22-30)  L D 08/21/18  05:17    


 


Anion Gap  17 mmol/L  08/21/18  05:17    


 


BUN  8 mg/dL (9-20)  L  08/21/18  05:17    


 


Creatinine  0.6 mg/dL (0.8-1.5)  L  08/21/18  05:17    


 


Estimated GFR  > 60 ml/min  08/21/18  05:17    


 


BUN/Creatinine Ratio  13 %  08/21/18  05:17    


 


Glucose  138 mg/dL ()  H  08/21/18  05:17    


 


POC Glucose  140  ()  H  08/19/18  18:07    


 


Calcium  8.4 mg/dL (8.4-10.2)   08/21/18  05:17    


 


Magnesium  2.20 mg/dL (1.7-2.3)   08/19/18  17:24    


 


Total Bilirubin  1.20 mg/dL (0.1-1.2)   08/19/18  17:27    


 


AST  50 units/L (5-40)  H  08/19/18  17:27    


 


ALT  63 units/L (7-56)  H  08/19/18  17:27    


 


Alkaline Phosphatase  67 units/L ()   08/19/18  17:27    


 


Ammonia  17.0 umol/L (25-60)  L  08/19/18  17:27    


 


Total Creatine Kinase  148 units/L ()   08/19/18  17:44    


 


CK-MB (CK-2)  1.6 ng/mL (0.0-4.0)   08/19/18  17:44    


 


CK-MB (CK-2) Rel Index  1.0  (0-4)   08/19/18  17:44    


 


Troponin T  < 0.010 ng/mL (0.00-0.029)   08/19/18  17:43    


 


Total Protein  7.2 g/dL (6.3-8.2)   08/19/18  17:27    


 


Albumin  3.6 g/dL (3.9-5)  L  08/19/18  17:27    


 


Albumin/Globulin Ratio  1.0 %  08/19/18  17:27    


 


TSH  3.030 mlU/mL (0.270-4.200)   08/19/18  17:27    


 


Free T4  0.92 ng/dL (0.76-1.46)   08/19/18  17:27    


 


Urine Color  Shamika  (Yellow)   08/19/18  17:59    


 


Urine Turbidity  Cloudy  (Clear)   08/19/18  17:59    


 


Urine pH  7.0  (5.0-7.0)   08/19/18  17:59    


 


Ur Specific Gravity  1.021  (1.003-1.030)   08/19/18  17:59    


 


Urine Protein  30 mg/dl mg/dL (Negative)   08/19/18  17:59    


 


Urine Glucose (UA)  Neg mg/dL (Negative)   08/19/18  17:59    


 


Urine Ketones  Neg mg/dL (Negative)   08/19/18  17:59    


 


Urine Blood  Neg  (Negative)   08/19/18  17:59    


 


Urine Nitrite  Neg  (Negative)   08/19/18  17:59    


 


Urine Bilirubin  Neg  (Negative)   08/19/18  17:59    


 


Urine Urobilinogen  4.0 mg/dL (<2.0)   08/19/18  17:59    


 


Ur Leukocyte Esterase  Lg  (Negative)   08/19/18  17:59    


 


Urine WBC (Auto)  92.0 /HPF (0.0-6.0)  H  08/19/18  17:59    


 


Urine RBC (Auto)  6.0 /HPF (0.0-6.0)   08/19/18  17:59    


 


U Epithel Cells (Auto)  < 1.0 /HPF (0-13.0)   08/19/18  17:59    


 


Urine Bacteria (Auto)  3+ /HPF (Negative)   08/19/18  17:59    


 


Ur Transition Epith Cell  2 /HPF  08/19/18  17:59    


 


Urine Mucus  1+ /HPF  08/19/18  17:59    


 


Salicylates  < 0.3 mg/dL (2.8-20.0)  L  08/19/18  17:27    


 


Urine Opiates Screen  Presumptive negative   08/19/18  17:59    


 


Urine Methadone Screen  Presumptive negative   08/19/18  17:59    


 


Acetaminophen  < 5.0 ug/mL (10.0-30.0)  L  08/19/18  17:27    


 


Ur Barbiturates Screen  Presumptive negative   08/19/18  17:59    


 


Ur Phencyclidine Scrn  Presumptive negative   08/19/18  17:59    


 


Ur Amphetamines Screen  Presumptive negative   08/19/18  17:59    


 


U Benzodiazepines Scrn  Presumptive negative   08/19/18  17:59    


 


Urine Cocaine Screen  Presumptive negative   08/19/18  17:59    


 


U Marijuana (THC) Screen  Presumptive negative   08/19/18  17:59    


 


Drugs of Abuse Note  Disclamer   08/19/18  17:59    


 


Plasma/Serum Alcohol  < 0.01 % (0-0.07)   08/19/18  17:27

## 2018-08-22 NOTE — PROGRESS NOTE
Subjective





- Reason for Consult


Consult date: 08/22/18


Reason for consult: Psychiatric Follow-up Evaluation





- Chief Complaint


Chief complaint: 


"The patient is nonverbal"





Patient is a 58-year-old  male who presents to the ER for AMS 

from [place]. The psychiatry team was consulted to see patient for catatonic 

like state. Today the patient is nonverbal during the assessment. He does not 

respond to commands, but grimace to the sternum rub. Per staff patient has been 

asleep all day. Although patient is arousable, he continues to be nonverbal. No 

agitation/irritation noted.  





Mental Status Exam





- Vital signs


 Last Vital Signs











Temp  97.9 F   08/22/18 05:22


 


Pulse  77   08/21/18 17:44


 


Resp  18   08/22/18 05:22


 


BP  115/75   08/22/18 05:22


 


Pulse Ox  98   08/21/18 17:44














- Exam


Narrative exam: 


Unable to complete the MSE because of the patient's condition.











Assessment and Plan


Impression: R/O Catatonia. Today the patient is nonverbal during the 

assessment. The patient's VS are stable.





Recommendation/Plan: 


1. Continue 1013 and gather collateral information to help determine proper 

treatment and dispo. Continue Ativan 1 mg IM Q8hrs for catatonia.


2. Recommend neuro consult, GI Prophylaxis, and continue hydration. Monitor 

patient's V/S and airway. Will follow up with patient daily to R/O NMS and 

Serotonin Syndrome. 


3. Will continue to monitor mood, orientation, catatonia, sleep, appetite, sleep

, compliance, and side effects.

## 2018-08-22 NOTE — MAGNETIC RESONANCE REPORT
FINAL REPORT



EXAM:  MR BRAIN WO CON



HISTORY:  AMS 



TECHNIQUE:  T1 and T2 weighted sagittal, axial, coronal and

diffusion-weighted images of the brain were obtained.



Comparison: Head CT dated August 19, 2018 



FINDINGS:  

Visualization detail on some the sequences is significantly

limited by motion artifact.



There are a few small scattered T2 signal abnormalities in the

subcortical white matter of the frontal lobes that are

nonspecific in appearance but may represent chronic post ischemic

or postinflammatory change and are not unexpected at this

patient's age. 



There is no diffusion abnormality to suggest the presence of

acute infarct. 



There is no evidence of intracranial hemorrhage nor intracranial

mass. 



The ventricles are normal size. 



Expected flow void is demonstrated within the major intracranial

vessels. 



The extracranial structures are notable for mild to moderate

right ethmoid sinus mucosal thickening. 



The craniocervical junction is unremarkable in appearance. 



IMPRESSION:  

1. Study somewhat degraded by motion artifact but remains of

diagnostic quality.



2. MRI brain within normal limits for the patient's age.

## 2018-08-22 NOTE — CONSULTATION
NEUROLOGICAL CONSULTATION



REASON FOR CONSULTATION:  Altered mental status.



HISTORY OF PRESENT ILLNESS:  Obtained from the records.  No family member.  No

other important source.  This is history from the Emergency Room physician, Dr. Leslie Boudreaux.  The source of the Emergency Room physician is from the EMS and

old records and also from the family.  The patient apparently was brought in

with the family members because of altered mental status.  The patient started

to have hallucinations at the end of 07/2018.  He received an Aristada injection

as an outpatient.  Then, he developed hallucination and aggressive behavior.  He

did not improve despite the medication.  Therefore, he was taken to [facility]

sometime in 08/06/2018.  He apparently also went to [place] ____.  The family

said that they were told by the primary psychiatrist that the patient should now

only be taking Abilify since he received Aristada shots.  He was supposed to

continue the Zyprexa.  He continued the Zyprexa while he was inpatient.  He was

reported to be combative and hallucinating.  He had received multiple sedations.

 Then, he was discharged to the family, but at that time, he was described to be

in a catatonic state.  He could not control his urine; therefore, he was wearing

a diaper.  He cannot walk, cannot talk or care for himself on 08/13/2018, I was

not sure whether this is the start.  Then, they describe the baseline, the

patient has hallucinations due to psychosis, but mostly alert and oriented x 3

and able to perform daily living activities.  He was apparently transferred back

to [facility] in August 14.  Then, he has not ambulated, eaten or done anything

in at least 4-5 days, so he was brought here to the Emergency Room.  He

apparently had a fall at the [facility] during his stay in the

facility.



In summary, this patient, therefore, has some vague psychiatric symptoms, which

has gotten progressively worse with development of catatonia lately also with

some loss of higher cortical function.



PAST MEDICAL HISTORY:  Just psychiatric history anxiety, depression,

schizophrenia.



PAST SURGICAL HISTORY:  Unknown.



HABITS:  Apparently, the patient does not smoke.  He lives at home, but has had

admissions to [facility].  No substance abuse.



PHYSICAL EXAMINATION:

GENERAL:  Revealed the patient who is chronically ill looking.  He is

restrained.  He has mittens in both hands.

VITAL SIGNS:  He is afebrile, pulse rate was about 90, respirations 18, blood

pressure 110/70.

HEAD, EYES, EARS, NOSE, MOUTH, AND THROAT:  Showed him to have a retarded look. 

He would not talk even with a lot of encouraging.  He would just open his eyes

to pain.  No vocal production.  No intracranial or intraorbital bruit.

NECK:  Rigid in all directions, increased tone.

HEART:  Regular in rhythm, somewhat tachycardic.

LUNGS:  Sounds clear.

ABDOMEN:  Voluntary muscular guarding.

EXTREMITIES:  Thin.

NEUROLOGIC:  The patient is not talking.  ____ described to be mute.  He just

grimaced to painful stimulation.  Cranial nerve limited, but with grimacing, he

has symmetrical facial contraction.  He would not protrude the tongue.  Pupils,

he would not keep his eyes open.  Motor examination, he does not move much to

command.  He does some slight movement spontaneously.  His tone is increased in

the upper and especially in the lower extremities.  This is also observed in his

neck.  Reflexes difficult to obtain because of his tone.  He has no Babinski. 

He has positive cortical release sign.



ASSESSMENT:

Neurologically, this patient appeared to have some higher cortical dysfunction. 

I wonder whether he already had a previous cerebral injury of some sort.  He did

have hallucination sometime in July that precipitated the injection of Aristada

and then after he had deteriorated.  I do not know exactly his baseline prior to

July, but I would not be surprised if he already had problem.  I wonder whether

this patient had already some mental retardation.



The catatonia that was described I believe is more of hypertonicity, but I would

not reject the diagnosis of a drug effect.



RECOMMENDATION:  I believe we should do at least just plain MRI of the brain and

also an EEG.  Will leave this to the primary care physician.  We will proceed

per physician and discuss with him.



Sixty minutes involving the history and physical examination and gathering of

information, more than 50% in the coordination of care.





DD: 08/21/2018 17:05

DT: 08/22/2018 04:59

JOB# [id number]  [id number]

YOUNG/CRESCENCIO

## 2018-08-23 LAB
BASOPHILS # (AUTO): 0 K/MM3 (ref 0–0.1)
BASOPHILS NFR BLD AUTO: 0.5 % (ref 0–1.8)
BUN SERPL-MCNC: 3 MG/DL (ref 9–20)
BUN/CREAT SERPL: 5 %
CALCIUM SERPL-MCNC: 8.3 MG/DL (ref 8.4–10.2)
EOSINOPHIL # BLD AUTO: 0.1 K/MM3 (ref 0–0.4)
EOSINOPHIL NFR BLD AUTO: 2.3 % (ref 0–4.3)
HCT VFR BLD CALC: 35.1 % (ref 35.5–45.6)
HEMOLYSIS INDEX: 2
HGB BLD-MCNC: 11.8 GM/DL (ref 11.8–15.2)
LYMPHOCYTES # BLD AUTO: 0.8 K/MM3 (ref 1.2–5.4)
LYMPHOCYTES NFR BLD AUTO: 15.5 % (ref 13.4–35)
MCH RBC QN AUTO: 27 PG (ref 28–32)
MCHC RBC AUTO-ENTMCNC: 34 % (ref 32–34)
MCV RBC AUTO: 81 FL (ref 84–94)
MONOCYTES # (AUTO): 0.5 K/MM3 (ref 0–0.8)
MONOCYTES % (AUTO): 9.4 % (ref 0–7.3)
PLATELET # BLD: 121 K/MM3 (ref 140–440)
RBC # BLD AUTO: 4.34 M/MM3 (ref 3.65–5.03)

## 2018-08-23 PROCEDURE — 4A00X4Z MEASUREMENT OF CENTRAL NERVOUS ELECTRICAL ACTIVITY, EXTERNAL APPROACH: ICD-10-PCS

## 2018-08-23 RX ADMIN — DEXTROSE AND SODIUM CHLORIDE SCH MLS/HR: 5; .45 INJECTION, SOLUTION INTRAVENOUS at 05:16

## 2018-08-23 RX ADMIN — CEFTRIAXONE SODIUM SCH MLS/HR: 1 INJECTION, POWDER, FOR SOLUTION INTRAMUSCULAR; INTRAVENOUS at 10:03

## 2018-08-23 RX ADMIN — DEXTROSE AND SODIUM CHLORIDE SCH MLS/HR: 5; .45 INJECTION, SOLUTION INTRAVENOUS at 17:15

## 2018-08-23 RX ADMIN — HEPARIN SODIUM SCH UNIT: 5000 INJECTION, SOLUTION INTRAVENOUS; SUBCUTANEOUS at 10:03

## 2018-08-23 RX ADMIN — HEPARIN SODIUM SCH UNIT: 5000 INJECTION, SOLUTION INTRAVENOUS; SUBCUTANEOUS at 22:14

## 2018-08-23 RX ADMIN — LORAZEPAM SCH MG: 2 INJECTION INTRAMUSCULAR; INTRAVENOUS at 22:13

## 2018-08-23 RX ADMIN — LORAZEPAM SCH MG: 2 INJECTION INTRAMUSCULAR; INTRAVENOUS at 15:41

## 2018-08-23 RX ADMIN — LORAZEPAM SCH MG: 2 INJECTION INTRAMUSCULAR; INTRAVENOUS at 05:16

## 2018-08-23 NOTE — PROGRESS NOTE
Assessment and Plan


Assessment and plan: 





Catatonia


- mental health following.  Continue Ativan 1 mg every 8 hours.





Metabolic encephalopathy


- Supportive care





UTI


-Continue IV antibiotics and await sensitivities.  Urine culture reveals gram-

negative marin.  Follow-up blood cultures.





Dehydration


- Patient is on D5 half-normal saline, condition is not taking anything by mouth





Suspected femoral fracture


-Evaluated by orthopedics, reviewed his imaging and showed no fracture





DVT prophylaxis








History


Interval history: 





No new issues overnight.





Hospitalist Physical





- Constitutional


Vitals: 


 











Temp Pulse Resp BP Pulse Ox


 


 97.4 F L  84   18   116/74   97 


 


 08/23/18 04:18  08/23/18 04:18  08/23/18 04:18  08/23/18 04:18  08/23/18 04:18











General appearance: Present: no acute distress, other (catatonic)





- EENT


Eyes: Present: PERRL, EOM intact


ENT: hearing intact, clear oral mucosa, dentition normal





- Neck


Neck: Present: supple, normal ROM





- Respiratory


Respiratory effort: normal


Respiratory: bilateral: CTA





- Cardiovascular


Rhythm: regular


Heart Sounds: Present: S1 & S2.  Absent: gallop, rub





- Extremities


Extremities: no ischemia, No edema, Full ROM





- Abdominal


General gastrointestinal: soft, non-tender, non-distended, normal bowel sounds





- Integumentary


Integumentary: Present: clear, warm, dry





- Neurologic


Neurologic: other (unresponsive, catatonic)





Results





- Labs


CBC & Chem 7: 


 08/23/18 05:19





 08/23/18 05:19


Labs: 


 Laboratory Last Values











WBC  5.0 K/mm3 (4.5-11.0)   08/23/18  05:19    


 


RBC  4.34 M/mm3 (3.65-5.03)   08/23/18  05:19    


 


Hgb  11.8 gm/dl (11.8-15.2)   08/23/18  05:19    


 


Hct  35.1 % (35.5-45.6)  L  08/23/18  05:19    


 


MCV  81 fl (84-94)  L  08/23/18  05:19    


 


MCH  27 pg (28-32)  L  08/23/18  05:19    


 


MCHC  34 % (32-34)   08/23/18  05:19    


 


RDW  14.2 % (13.2-15.2)   08/23/18  05:19    


 


Plt Count  121 K/mm3 (140-440)  L  08/23/18  05:19    


 


Lymph % (Auto)  15.5 % (13.4-35.0)   08/23/18  05:19    


 


Mono % (Auto)  9.4 % (0.0-7.3)  H  08/23/18  05:19    


 


Eos % (Auto)  2.3 % (0.0-4.3)   08/23/18  05:19    


 


Baso % (Auto)  0.5 % (0.0-1.8)   08/23/18  05:19    


 


Lymph #  0.8 K/mm3 (1.2-5.4)  L  08/23/18  05:19    


 


Mono #  0.5 K/mm3 (0.0-0.8)   08/23/18  05:19    


 


Eos #  0.1 K/mm3 (0.0-0.4)   08/23/18  05:19    


 


Baso #  0.0 K/mm3 (0.0-0.1)   08/23/18  05:19    


 


Total Counted  Cancelled   08/19/18  17:27    


 


Seg Neutrophils %  72.3 % (40.0-70.0)  H  08/23/18  05:19    


 


Seg Neuts % (Manual)  Cancelled   08/19/18  17:27    


 


Band Neutrophils %  Cancelled   08/19/18  17:27    


 


Lymphocytes % (Manual)  Cancelled   08/19/18  17:27    


 


Reactive Lymphs % (Man)  Cancelled   08/19/18  17:27    


 


Monocytes % (Manual)  Cancelled   08/19/18  17:27    


 


Eosinophils % (Manual)  Cancelled   08/19/18  17:27    


 


Basophils % (Manual)  Cancelled   08/19/18  17:27    


 


Metamyelocytes %  Cancelled   08/19/18  17:27    


 


Myelocytes %  Cancelled   08/19/18  17:27    


 


Promyelocytes %  Cancelled   08/19/18  17:27    


 


Blast Cells %  Cancelled   08/19/18  17:27    


 


Nucleated RBC %  Cancelled   08/19/18  17:27    


 


Seg Neutrophils #  3.6 K/mm3 (1.8-7.7)   08/23/18  05:19    


 


Seg Neutrophils # Man  Cancelled   08/19/18  17:27    


 


Band Neutrophils #  Cancelled   08/19/18  17:27    


 


Lymphocytes # (Manual)  Cancelled   08/19/18  17:27    


 


Abs React Lymphs (Man)  Cancelled   08/19/18  17:27    


 


Monocytes # (Manual)  Cancelled   08/19/18  17:27    


 


Eosinophils # (Manual)  Cancelled   08/19/18  17:27    


 


Basophils # (Manual)  Cancelled   08/19/18  17:27    


 


Metamyelocytes #  Cancelled   08/19/18  17:27    


 


Myelocytes #  Cancelled   08/19/18  17:27    


 


Promyelocytes #  Cancelled   08/19/18  17:27    


 


Blast Cells #  Cancelled   08/19/18  17:27    


 


WBC Morphology  Cancelled   08/19/18  17:27    


 


Hypersegmented Neuts  Cancelled   08/19/18  17:27    


 


Hyposegmented Neuts  Cancelled   08/19/18  17:27    


 


Hypogranular Neuts  Cancelled   08/19/18  17:27    


 


Hypersegmented Polys  Cancelled   08/19/18  17:27    


 


Smudge Cells  Cancelled   08/19/18  17:27    


 


Toxic Granulation  Cancelled   08/19/18  17:27    


 


Toxic Vacuolation  Cancelled   08/19/18  17:27    


 


Dohle Bodies  Cancelled   08/19/18  17:27    


 


Pelger-Huet Anomaly  Cancelled   08/19/18  17:27    


 


Phillip Rods  Cancelled   08/19/18  17:27    


 


Platelet Estimate  Cancelled   08/19/18  17:27    


 


Clumped Platelets  Cancelled   08/19/18  17:27    


 


Plt Clumps, EDTA  Cancelled   08/19/18  17:27    


 


Large Platelets  Cancelled   08/19/18  17:27    


 


Giant Platelets  Cancelled   08/19/18  17:27    


 


Platelet Satelliting  Cancelled   08/19/18  17:27    


 


Plt Morphology Comment  Cancelled   08/19/18  17:27    


 


RBC Morphology  Cancelled   08/19/18  17:27    


 


Dimorphic RBCs  Cancelled   08/19/18  17:27    


 


Polychromasia  Cancelled   08/19/18  17:27    


 


Hypochromasia  Cancelled   08/19/18  17:27    


 


Poikilocytosis  Cancelled   08/19/18  17:27    


 


Basophilic Stippling  Cancelled   08/19/18  17:27    


 


Anisocytosis  Cancelled   08/19/18  17:27    


 


Microcytosis  Cancelled   08/19/18  17:27    


 


Macrocytosis  Cancelled   08/19/18  17:27    


 


Spherocytes  Cancelled   08/19/18  17:27    


 


Pappenheimer Bodies  Cancelled   08/19/18  17:27    


 


Sickle Cells  Cancelled   08/19/18  17:27    


 


Target Cells  Cancelled   08/19/18  17:27    


 


Tear Drop Cells  Cancelled   08/19/18  17:27    


 


Ovalocytes  Cancelled   08/19/18  17:27    


 


Stomatocytes  Cancelled   08/19/18  17:27    


 


Helmet Cells  Cancelled   08/19/18  17:27    


 


Burns-Jolly Bodies  Cancelled   08/19/18  17:27    


 


Cabot Rings  Cancelled   08/19/18  17:27    


 


[place] Cells  Cancelled   08/19/18  17:27    


 


Bite Cells  Cancelled   08/19/18  17:27    


 


Crenated Cell  Cancelled   08/19/18  17:27    


 


Elliptocytes  Cancelled   08/19/18  17:27    


 


Acanthocytes (Spur)  Cancelled   08/19/18  17:27    


 


Rouleaux  Cancelled   08/19/18  17:27    


 


Hemoglobin C Crystals  Cancelled   08/19/18  17:27    


 


Schistocytes  Cancelled   08/19/18  17:27    


 


Malaria parasites  Cancelled   08/19/18  17:27    


 


Abhishek Bodies  Cancelled   08/19/18  17:27    


 


Hem Pathologist Commnt  Cancelled   08/19/18  17:27    


 


Sodium  135 mmol/L (137-145)  L  08/23/18  05:19    


 


Potassium  3.4 mmol/L (3.6-5.0)  L  08/23/18  05:19    


 


Chloride  100.6 mmol/L ()   08/23/18  05:19    


 


Carbon Dioxide  22 mmol/L (22-30)   08/23/18  05:19    


 


Anion Gap  16 mmol/L  08/23/18  05:19    


 


BUN  3 mg/dL (9-20)  L  08/23/18  05:19    


 


Creatinine  0.6 mg/dL (0.8-1.5)  L  08/23/18  05:19    


 


Estimated GFR  > 60 ml/min  08/23/18  05:19    


 


BUN/Creatinine Ratio  5 %  08/23/18  05:19    


 


Glucose  113 mg/dL ()  H  08/23/18  05:19    


 


POC Glucose  140  ()  H  08/19/18  18:07    


 


Calcium  8.3 mg/dL (8.4-10.2)  L  08/23/18  05:19    


 


Magnesium  2.20 mg/dL (1.7-2.3)   08/19/18  17:24    


 


Total Bilirubin  1.20 mg/dL (0.1-1.2)   08/19/18  17:27    


 


AST  50 units/L (5-40)  H  08/19/18  17:27    


 


ALT  63 units/L (7-56)  H  08/19/18  17:27    


 


Alkaline Phosphatase  67 units/L ()   08/19/18  17:27    


 


Ammonia  17.0 umol/L (25-60)  L  08/19/18  17:27    


 


Total Creatine Kinase  148 units/L ()   08/19/18  17:44    


 


CK-MB (CK-2)  1.6 ng/mL (0.0-4.0)   08/19/18  17:44    


 


CK-MB (CK-2) Rel Index  1.0  (0-4)   08/19/18  17:44    


 


Troponin T  < 0.010 ng/mL (0.00-0.029)   08/19/18  17:43    


 


Total Protein  7.2 g/dL (6.3-8.2)   08/19/18  17:27    


 


Albumin  3.6 g/dL (3.9-5)  L  08/19/18  17:27    


 


Albumin/Globulin Ratio  1.0 %  08/19/18  17:27    


 


TSH  3.030 mlU/mL (0.270-4.200)   08/19/18  17:27    


 


Free T4  0.92 ng/dL (0.76-1.46)   08/19/18  17:27    


 


Urine Color  Shamika  (Yellow)   08/19/18  17:59    


 


Urine Turbidity  Cloudy  (Clear)   08/19/18  17:59    


 


Urine pH  7.0  (5.0-7.0)   08/19/18  17:59    


 


Ur Specific Gravity  1.021  (1.003-1.030)   08/19/18  17:59    


 


Urine Protein  30 mg/dl mg/dL (Negative)   08/19/18  17:59    


 


Urine Glucose (UA)  Neg mg/dL (Negative)   08/19/18  17:59    


 


Urine Ketones  Neg mg/dL (Negative)   08/19/18  17:59    


 


Urine Blood  Neg  (Negative)   08/19/18  17:59    


 


Urine Nitrite  Neg  (Negative)   08/19/18  17:59    


 


Urine Bilirubin  Neg  (Negative)   08/19/18  17:59    


 


Urine Urobilinogen  4.0 mg/dL (<2.0)   08/19/18  17:59    


 


Ur Leukocyte Esterase  Lg  (Negative)   08/19/18  17:59    


 


Urine WBC (Auto)  92.0 /HPF (0.0-6.0)  H  08/19/18  17:59    


 


Urine RBC (Auto)  6.0 /HPF (0.0-6.0)   08/19/18  17:59    


 


U Epithel Cells (Auto)  < 1.0 /HPF (0-13.0)   08/19/18  17:59    


 


Urine Bacteria (Auto)  3+ /HPF (Negative)   08/19/18  17:59    


 


Ur Transition Epith Cell  2 /HPF  08/19/18  17:59    


 


Urine Mucus  1+ /HPF  08/19/18  17:59    


 


Salicylates  < 0.3 mg/dL (2.8-20.0)  L  08/19/18  17:27    


 


Urine Opiates Screen  Presumptive negative   08/19/18  17:59    


 


Urine Methadone Screen  Presumptive negative   08/19/18  17:59    


 


Acetaminophen  < 5.0 ug/mL (10.0-30.0)  L  08/19/18  17:27    


 


Ur Barbiturates Screen  Presumptive negative   08/19/18  17:59    


 


Ur Phencyclidine Scrn  Presumptive negative   08/19/18  17:59    


 


Ur Amphetamines Screen  Presumptive negative   08/19/18  17:59    


 


U Benzodiazepines Scrn  Presumptive negative   08/19/18  17:59    


 


Urine Cocaine Screen  Presumptive negative   08/19/18  17:59    


 


U Marijuana (THC) Screen  Presumptive negative   08/19/18  17:59    


 


Drugs of Abuse Note  Disclamer   08/19/18  17:59    


 


Plasma/Serum Alcohol  < 0.01 % (0-0.07)   08/19/18  17:27

## 2018-08-23 NOTE — PROGRESS NOTE
Subjective





- Reason for Consult


Consult date: 08/23/18


Reason for consult: Psychiatry Follow-up





- Chief Complaint


Chief complaint: 


"The patient is nonverbal"





Patient is a 58-year-old  male who presents to the ER for AMS 

from [facility]. The psychiatry team was consulted to see patient for catatonic 

like state. Today the patient is still nonverbal during the assessment. The 

patient attempted to open his eyes per the sternum rub. Per collateral 

information from Charlene his ex girlfriend at [phone], she stated that the 

patient had a "mental health break" two yrs ago. She denies a fam psy hx for 

this patient. Per the staff, the patient isn't eating. No agitation/irritation 

noted.  





Mental Status Exam





- Vital signs


 Last Vital Signs











Temp  97.4 F L  08/23/18 04:18


 


Pulse  84   08/23/18 04:18


 


Resp  18   08/23/18 04:18


 


BP  116/74   08/23/18 04:18


 


Pulse Ox  97   08/23/18 04:18














- Exam


Narrative exam: 


Unable to complete the MSE because of the patient's condition.











Assessment and Plan


Impression: R/O Catatonia. Today the patient is still nonverbal during the 

assessment. Plt 81. WBC Urine 92. The patient's VS are stable. The patient is 

pending a EEG.





Recommendation/Plan: Continue 1013. Continue Ativan 1 mg IM Q8hrs for 

catatonia. Recommend GI Prophylaxis and continue hydration. Monitor patient's V/

S and airway. Will follow up with patient daily to R/O NMS and Serotonin 

Syndrome.

## 2018-08-24 RX ADMIN — DEXTROSE AND SODIUM CHLORIDE SCH MLS/HR: 5; .45 INJECTION, SOLUTION INTRAVENOUS at 22:02

## 2018-08-24 RX ADMIN — LORAZEPAM SCH MG: 2 INJECTION INTRAMUSCULAR; INTRAVENOUS at 14:05

## 2018-08-24 RX ADMIN — HEPARIN SODIUM SCH UNIT: 5000 INJECTION, SOLUTION INTRAVENOUS; SUBCUTANEOUS at 10:15

## 2018-08-24 RX ADMIN — CEFTRIAXONE SODIUM SCH MLS/HR: 1 INJECTION, POWDER, FOR SOLUTION INTRAMUSCULAR; INTRAVENOUS at 10:14

## 2018-08-24 RX ADMIN — DEXTROSE AND SODIUM CHLORIDE SCH MLS/HR: 5; .45 INJECTION, SOLUTION INTRAVENOUS at 01:53

## 2018-08-24 RX ADMIN — LORAZEPAM SCH MG: 2 INJECTION INTRAMUSCULAR; INTRAVENOUS at 06:25

## 2018-08-24 RX ADMIN — HEPARIN SODIUM SCH UNIT: 5000 INJECTION, SOLUTION INTRAVENOUS; SUBCUTANEOUS at 21:57

## 2018-08-24 RX ADMIN — LORAZEPAM SCH MG: 2 INJECTION INTRAMUSCULAR; INTRAVENOUS at 21:56

## 2018-08-24 NOTE — PROGRESS NOTE
Subjective





- Reason for Consult


Consult date: 08/24/18


Reason for consult: Psychiatry Follow-up





- Chief Complaint


Chief complaint: 


"The patient is nonverbal"





Patient is a 58-year-old  male who presents to the ER for AMS 

from [facility]. The psychiatry team was consulted to see patient for catatonic 

like state. Today the patient is still nonverbal during the assessment. He did 

open his eye correction when his name was called. Per the staff, the patient still 

isn't eating. No agitation/irritation noted.  





Mental Status Exam





- Vital signs


 Last Vital Signs











Temp  97.6 F   08/24/18 06:01


 


Pulse  86   08/24/18 06:01


 


Resp  16   08/24/18 06:01


 


BP  114/70   08/24/18 06:01


 


Pulse Ox  97   08/24/18 06:01














- Exam


Narrative exam: 


Unable to complete the MSE because of the patient's condition.























Assessment and Plan


Impression: R/O Catatonia. Today the patient is still nonverbal during the 

assessment. Plt 81. WBC Urine 92. The patient's VS are stable. The patient is 

pending a EEG.





Recommendation/Plan: Continue 1013. Continue Ativan 1 mg IM Q8hrs for 

catatonia. Recommend GI Prophylaxis and continue hydration. Monitor patient's V/

S and airway. Will follow up with patient daily to R/O NMS and Serotonin 

Syndrome.

## 2018-08-24 NOTE — PHYSICIAN PROGRESS NOTE
NEUROLOGY PROGRESS NOTE





SUBJECTIVE:  The patient is being seen because of altered mental status.  Its

appeared to be acute, but from the appearance of the patient and examination,

the patient appeared to have a chronic problem.  I do not feel that he has

catatonic problem, this is mostly a static brain disease.





OBJECTIVE EXAMINATION:

VITAL SIGNS:  Stable.

EXTREMITIES:  The patient has increased tone in both upper extremities.

GENERAL:  The patient is awake, but not cooperative.





RADIOGRAPHIC STUDIES:  MRI of the brain without contrast is otherwise

unremarkable.





ASSESSMENT:  The patient has disturbance of higher cortical function.  Etiology

is not clear.  I suspect a previous cortical injury, possible ____.





PLAN:  MRI has been done, this was reviewed.  We are awaiting for EEG.



Prognosis overall is very poor.





DD: 08/23/2018 17:17

DT: 08/24/2018 01:26

JOB# [id number]  [id number]

YOUNG/CRESCENCIO

## 2018-08-24 NOTE — PHYSICIAN PROGRESS NOTE
SUBJECTIVE:  The patient is just basically the same.  He appeared to be awake,

eyes open, but no meaningful response.



He has had workup already and his MRI did not show anything significant.  There

were just chronic changes.  No acute infarct, no tumor.  His EEG read by me

showed a moderately diffuse slowing without any seizure.





OBJECTIVE EXAMINATION:

VITAL SIGNS:  Stable.

NEUROLOGIC:  Mental status is poor.  He looks awake, but he is nonverbal.  He

appeared to be in vegetative state.

EXTREMITIES:  Increased tone in the extremities.





ASSESSMENT:  Cortical degenerative changes.  Possibility of a previous cortical

injury, could be mental retardation.





RADIOGRAPHIC STUDIES:  His EEG showed cortical disturbance with moderately and

diffusely severe encephalopathy.





PLAN:  Continue current management.  In brief, this patient will end up in a

chronic care.  Prognosis overall looks very poor.



I will be following this patient. Since my locums time is over, please contact

the next neurologist if there is any problem.





DD: 08/24/2018 12:23

DT: 08/24/2018 22:28

JOB# [id number]  [id number]

YOUNG/CRESCENCIO

## 2018-08-24 NOTE — PROGRESS NOTE
Assessment and Plan


Assessment and plan: 





Catatonia


- mental health following. Continue 1013. Continue Ativan 1 mg IM Q8hrs for 

catatonia. 





Metabolic encephalopathy


- Supportive care





UTI


-Continue IV antibiotics and await sensitivities.  Urine culture reveals gram-

negative marin.  Follow-up blood cultures.





Dehydration


- Patient is on D5 half-normal saline, condition is not taking anything by mouth





Suspected femoral fracture


-Evaluated by orthopedics, reviewed his imaging and showed no fracture





DVT prophylaxis








History


Interval history: 





No new issues overnight.





Hospitalist Physical





- Constitutional


Vitals: 


 











Temp Pulse Resp BP Pulse Ox


 


 97.6 F   86   16   114/70   97 


 


 08/24/18 06:01  08/24/18 06:01  08/24/18 06:01  08/24/18 06:01  08/24/18 06:01











General appearance: Present: no acute distress, other (catatonic)





- EENT


Eyes: Present: PERRL, EOM intact


ENT: hearing intact, clear oral mucosa, dentition normal





- Neck


Neck: Present: supple, normal ROM





- Respiratory


Respiratory effort: normal


Respiratory: bilateral: CTA





- Cardiovascular


Rhythm: regular


Heart Sounds: Present: S1 & S2.  Absent: gallop, rub





- Extremities


Extremities: no ischemia, No edema, Full ROM





- Abdominal


General gastrointestinal: soft, non-tender, non-distended, normal bowel sounds





- Integumentary


Integumentary: Present: clear, warm, dry





- Neurologic


Neurologic: CNII-XII intact, moves all extremities





Results





- Labs


CBC & Chem 7: 


 08/23/18 05:19





 08/23/18 05:19


Labs: 


 Laboratory Last Values











WBC  5.0 K/mm3 (4.5-11.0)   08/23/18  05:19    


 


RBC  4.34 M/mm3 (3.65-5.03)   08/23/18  05:19    


 


Hgb  11.8 gm/dl (11.8-15.2)   08/23/18  05:19    


 


Hct  35.1 % (35.5-45.6)  L  08/23/18  05:19    


 


MCV  81 fl (84-94)  L  08/23/18  05:19    


 


MCH  27 pg (28-32)  L  08/23/18  05:19    


 


MCHC  34 % (32-34)   08/23/18  05:19    


 


RDW  14.2 % (13.2-15.2)   08/23/18  05:19    


 


Plt Count  121 K/mm3 (140-440)  L  08/23/18  05:19    


 


Lymph % (Auto)  15.5 % (13.4-35.0)   08/23/18  05:19    


 


Mono % (Auto)  9.4 % (0.0-7.3)  H  08/23/18  05:19    


 


Eos % (Auto)  2.3 % (0.0-4.3)   08/23/18  05:19    


 


Baso % (Auto)  0.5 % (0.0-1.8)   08/23/18  05:19    


 


Lymph #  0.8 K/mm3 (1.2-5.4)  L  08/23/18  05:19    


 


Mono #  0.5 K/mm3 (0.0-0.8)   08/23/18  05:19    


 


Eos #  0.1 K/mm3 (0.0-0.4)   08/23/18  05:19    


 


Baso #  0.0 K/mm3 (0.0-0.1)   08/23/18  05:19    


 


Total Counted  Cancelled   08/19/18  17:27    


 


Seg Neutrophils %  72.3 % (40.0-70.0)  H  08/23/18  05:19    


 


Seg Neuts % (Manual)  Cancelled   08/19/18  17:27    


 


Band Neutrophils %  Cancelled   08/19/18  17:27    


 


Lymphocytes % (Manual)  Cancelled   08/19/18  17:27    


 


Reactive Lymphs % (Man)  Cancelled   08/19/18  17:27    


 


Monocytes % (Manual)  Cancelled   08/19/18  17:27    


 


Eosinophils % (Manual)  Cancelled   08/19/18  17:27    


 


Basophils % (Manual)  Cancelled   08/19/18  17:27    


 


Metamyelocytes %  Cancelled   08/19/18  17:27    


 


Myelocytes %  Cancelled   08/19/18  17:27    


 


Promyelocytes %  Cancelled   08/19/18  17:27    


 


Blast Cells %  Cancelled   08/19/18  17:27    


 


Nucleated RBC %  Cancelled   08/19/18  17:27    


 


Seg Neutrophils #  3.6 K/mm3 (1.8-7.7)   08/23/18  05:19    


 


Seg Neutrophils # Man  Cancelled   08/19/18  17:27    


 


Band Neutrophils #  Cancelled   08/19/18  17:27    


 


Lymphocytes # (Manual)  Cancelled   08/19/18  17:27    


 


Abs React Lymphs (Man)  Cancelled   08/19/18  17:27    


 


Monocytes # (Manual)  Cancelled   08/19/18  17:27    


 


Eosinophils # (Manual)  Cancelled   08/19/18  17:27    


 


Basophils # (Manual)  Cancelled   08/19/18  17:27    


 


Metamyelocytes #  Cancelled   08/19/18  17:27    


 


Myelocytes #  Cancelled   08/19/18  17:27    


 


Promyelocytes #  Cancelled   08/19/18  17:27    


 


Blast Cells #  Cancelled   08/19/18  17:27    


 


WBC Morphology  Cancelled   08/19/18  17:27    


 


Hypersegmented Neuts  Cancelled   08/19/18  17:27    


 


Hyposegmented Neuts  Cancelled   08/19/18  17:27    


 


Hypogranular Neuts  Cancelled   08/19/18  17:27    


 


Hypersegmented Polys  Cancelled   08/19/18  17:27    


 


Smudge Cells  Cancelled   08/19/18  17:27    


 


Toxic Granulation  Cancelled   08/19/18  17:27    


 


Toxic Vacuolation  Cancelled   08/19/18  17:27    


 


Dohle Bodies  Cancelled   08/19/18  17:27    


 


Pelger-Huet Anomaly  Cancelled   08/19/18  17:27    


 


Phillip Rods  Cancelled   08/19/18  17:27    


 


Platelet Estimate  Cancelled   08/19/18  17:27    


 


Clumped Platelets  Cancelled   08/19/18  17:27    


 


Plt Clumps, EDTA  Cancelled   08/19/18  17:27    


 


Large Platelets  Cancelled   08/19/18  17:27    


 


Giant Platelets  Cancelled   08/19/18  17:27    


 


Platelet Satelliting  Cancelled   08/19/18  17:27    


 


Plt Morphology Comment  Cancelled   08/19/18  17:27    


 


RBC Morphology  Cancelled   08/19/18  17:27    


 


Dimorphic RBCs  Cancelled   08/19/18  17:27    


 


Polychromasia  Cancelled   08/19/18  17:27    


 


Hypochromasia  Cancelled   08/19/18  17:27    


 


Poikilocytosis  Cancelled   08/19/18  17:27    


 


Basophilic Stippling  Cancelled   08/19/18  17:27    


 


Anisocytosis  Cancelled   08/19/18  17:27    


 


Microcytosis  Cancelled   08/19/18  17:27    


 


Macrocytosis  Cancelled   08/19/18  17:27    


 


Spherocytes  Cancelled   08/19/18  17:27    


 


Pappenheimer Bodies  Cancelled   08/19/18  17:27    


 


Sickle Cells  Cancelled   08/19/18  17:27    


 


Target Cells  Cancelled   08/19/18  17:27    


 


Tear Drop Cells  Cancelled   08/19/18  17:27    


 


Ovalocytes  Cancelled   08/19/18  17:27    


 


Stomatocytes  Cancelled   08/19/18  17:27    


 


Helmet Cells  Cancelled   08/19/18  17:27    


 


Burns-Jolly Bodies  Cancelled   08/19/18  17:27    


 


Cabot Rings  Cancelled   08/19/18  17:27    


 


Maybeury Cells  Cancelled   08/19/18  17:27    


 


Bite Cells  Cancelled   08/19/18  17:27    


 


Crenated Cell  Cancelled   08/19/18  17:27    


 


Elliptocytes  Cancelled   08/19/18  17:27    


 


Acanthocytes (Spur)  Cancelled   08/19/18  17:27    


 


Rouleaux  Cancelled   08/19/18  17:27    


 


Hemoglobin C Crystals  Cancelled   08/19/18  17:27    


 


Schistocytes  Cancelled   08/19/18  17:27    


 


Malaria parasites  Cancelled   08/19/18  17:27    


 


Abhishek Bodies  Cancelled   08/19/18  17:27    


 


Hem Pathologist Commnt  Cancelled   08/19/18  17:27    


 


Sodium  135 mmol/L (137-145)  L  08/23/18  05:19    


 


Potassium  3.4 mmol/L (3.6-5.0)  L  08/23/18  05:19    


 


Chloride  100.6 mmol/L ()   08/23/18  05:19    


 


Carbon Dioxide  22 mmol/L (22-30)   08/23/18  05:19    


 


Anion Gap  16 mmol/L  08/23/18  05:19    


 


BUN  3 mg/dL (9-20)  L  08/23/18  05:19    


 


Creatinine  0.6 mg/dL (0.8-1.5)  L  08/23/18  05:19    


 


Estimated GFR  > 60 ml/min  08/23/18  05:19    


 


BUN/Creatinine Ratio  5 %  08/23/18  05:19    


 


Glucose  113 mg/dL ()  H  08/23/18  05:19    


 


POC Glucose  140  ()  H  08/19/18  18:07    


 


Calcium  8.3 mg/dL (8.4-10.2)  L  08/23/18  05:19    


 


Magnesium  2.20 mg/dL (1.7-2.3)   08/19/18  17:24    


 


Total Bilirubin  1.20 mg/dL (0.1-1.2)   08/19/18  17:27    


 


AST  50 units/L (5-40)  H  08/19/18  17:27    


 


ALT  63 units/L (7-56)  H  08/19/18  17:27    


 


Alkaline Phosphatase  67 units/L ()   08/19/18  17:27    


 


Ammonia  17.0 umol/L (25-60)  L  08/19/18  17:27    


 


Total Creatine Kinase  148 units/L ()   08/19/18  17:44    


 


CK-MB (CK-2)  1.6 ng/mL (0.0-4.0)   08/19/18  17:44    


 


CK-MB (CK-2) Rel Index  1.0  (0-4)   08/19/18  17:44    


 


Troponin T  < 0.010 ng/mL (0.00-0.029)   08/19/18  17:43    


 


Total Protein  7.2 g/dL (6.3-8.2)   08/19/18  17:27    


 


Albumin  3.6 g/dL (3.9-5)  L  08/19/18  17:27    


 


Albumin/Globulin Ratio  1.0 %  08/19/18  17:27    


 


TSH  3.030 mlU/mL (0.270-4.200)   08/19/18  17:27    


 


Free T4  0.92 ng/dL (0.76-1.46)   08/19/18  17:27    


 


Urine Color  Shamika  (Yellow)   08/19/18  17:59    


 


Urine Turbidity  Cloudy  (Clear)   08/19/18  17:59    


 


Urine pH  7.0  (5.0-7.0)   08/19/18  17:59    


 


Ur Specific Gravity  1.021  (1.003-1.030)   08/19/18  17:59    


 


Urine Protein  30 mg/dl mg/dL (Negative)   08/19/18  17:59    


 


Urine Glucose (UA)  Neg mg/dL (Negative)   08/19/18  17:59    


 


Urine Ketones  Neg mg/dL (Negative)   08/19/18  17:59    


 


Urine Blood  Neg  (Negative)   08/19/18  17:59    


 


Urine Nitrite  Neg  (Negative)   08/19/18  17:59    


 


Urine Bilirubin  Neg  (Negative)   08/19/18  17:59    


 


Urine Urobilinogen  4.0 mg/dL (<2.0)   08/19/18  17:59    


 


Ur Leukocyte Esterase  Lg  (Negative)   08/19/18  17:59    


 


Urine WBC (Auto)  92.0 /HPF (0.0-6.0)  H  08/19/18  17:59    


 


Urine RBC (Auto)  6.0 /HPF (0.0-6.0)   08/19/18  17:59    


 


U Epithel Cells (Auto)  < 1.0 /HPF (0-13.0)   08/19/18  17:59    


 


Urine Bacteria (Auto)  3+ /HPF (Negative)   08/19/18  17:59    


 


Ur Transition Epith Cell  2 /HPF  08/19/18  17:59    


 


Urine Mucus  1+ /HPF  08/19/18  17:59    


 


Salicylates  < 0.3 mg/dL (2.8-20.0)  L  08/19/18  17:27    


 


Urine Opiates Screen  Presumptive negative   08/19/18  17:59    


 


Urine Methadone Screen  Presumptive negative   08/19/18  17:59    


 


Acetaminophen  < 5.0 ug/mL (10.0-30.0)  L  08/19/18  17:27    


 


Ur Barbiturates Screen  Presumptive negative   08/19/18  17:59    


 


Ur Phencyclidine Scrn  Presumptive negative   08/19/18  17:59    


 


Ur Amphetamines Screen  Presumptive negative   08/19/18  17:59    


 


U Benzodiazepines Scrn  Presumptive negative   08/19/18  17:59    


 


Urine Cocaine Screen  Presumptive negative   08/19/18  17:59    


 


U Marijuana (THC) Screen  Presumptive negative   08/19/18  17:59    


 


Drugs of Abuse Note  Disclamer   08/19/18  17:59    


 


Plasma/Serum Alcohol  < 0.01 % (0-0.07)   08/19/18  17:27

## 2018-08-25 LAB
BASOPHILS # (AUTO): 0 K/MM3 (ref 0–0.1)
BASOPHILS NFR BLD AUTO: 0.4 % (ref 0–1.8)
BUN SERPL-MCNC: 2 MG/DL (ref 9–20)
BUN/CREAT SERPL: 3 %
CALCIUM SERPL-MCNC: 9 MG/DL (ref 8.4–10.2)
EOSINOPHIL # BLD AUTO: 0.1 K/MM3 (ref 0–0.4)
EOSINOPHIL NFR BLD AUTO: 2.2 % (ref 0–4.3)
HCT VFR BLD CALC: 35.1 % (ref 35.5–45.6)
HEMOLYSIS INDEX: 5
HGB BLD-MCNC: 12.4 GM/DL (ref 11.8–15.2)
LYMPHOCYTES # BLD AUTO: 0.9 K/MM3 (ref 1.2–5.4)
LYMPHOCYTES NFR BLD AUTO: 15.1 % (ref 13.4–35)
MCH RBC QN AUTO: 28 PG (ref 28–32)
MCHC RBC AUTO-ENTMCNC: 35 % (ref 32–34)
MCV RBC AUTO: 80 FL (ref 84–94)
MONOCYTES # (AUTO): 0.5 K/MM3 (ref 0–0.8)
MONOCYTES % (AUTO): 8.1 % (ref 0–7.3)
PLATELET # BLD: 197 K/MM3 (ref 140–440)
RBC # BLD AUTO: 4.4 M/MM3 (ref 3.65–5.03)

## 2018-08-25 RX ADMIN — HEPARIN SODIUM SCH UNIT: 5000 INJECTION, SOLUTION INTRAVENOUS; SUBCUTANEOUS at 10:38

## 2018-08-25 RX ADMIN — DEXTROSE AND SODIUM CHLORIDE SCH MLS/HR: 5; .45 INJECTION, SOLUTION INTRAVENOUS at 14:40

## 2018-08-25 RX ADMIN — LORAZEPAM SCH MG: 2 INJECTION INTRAMUSCULAR; INTRAVENOUS at 06:42

## 2018-08-25 RX ADMIN — LORAZEPAM SCH MG: 2 INJECTION INTRAMUSCULAR; INTRAVENOUS at 21:30

## 2018-08-25 RX ADMIN — CEFTRIAXONE SODIUM SCH MLS/HR: 1 INJECTION, POWDER, FOR SOLUTION INTRAMUSCULAR; INTRAVENOUS at 10:37

## 2018-08-25 RX ADMIN — LORAZEPAM SCH: 2 INJECTION INTRAMUSCULAR; INTRAVENOUS at 14:00

## 2018-08-25 RX ADMIN — DEXTROSE AND SODIUM CHLORIDE SCH MLS/HR: 5; .45 INJECTION, SOLUTION INTRAVENOUS at 06:42

## 2018-08-25 RX ADMIN — HEPARIN SODIUM SCH UNIT: 5000 INJECTION, SOLUTION INTRAVENOUS; SUBCUTANEOUS at 21:32

## 2018-08-25 NOTE — PROGRESS NOTE
Assessment and Plan


Assessment and plan: 





Catatonia


- mental health following. Continue 1013. Continue Ativan 1 mg IM Q8hrs for 

catatonia. 





Metabolic encephalopathy


- Supportive care.  EEG reveals diffuse moderate encephalopathy.  MRI negative





UTI


-Continue IV antibiotics and await sensitivities.  Urine culture reveals 

Citrobacter.  Follow-up blood cultures.





Dehydration


- Patient is on D5 half-normal saline, condition is not taking anything by 

mouth.  Place Dobhoff for TF





Suspected femoral fracture


-Evaluated by orthopedics, reviewed his imaging and showed no fracture





DVT prophylaxis








History


Interval history: 





No new issues overnight.





Hospitalist Physical





- Constitutional


Vitals: 


 











Temp Pulse Resp BP Pulse Ox


 


 98.5 F   93 H  18   119/79   94 


 


 08/25/18 11:48  08/25/18 11:48  08/25/18 11:48  08/25/18 11:48  08/25/18 11:48











General appearance: Present: no acute distress, other (catatonic)





- EENT


Eyes: Present: PERRL, EOM intact


ENT: hearing intact, clear oral mucosa, dentition normal





- Neck


Neck: Present: supple, normal ROM





- Respiratory


Respiratory effort: normal


Respiratory: bilateral: CTA





- Cardiovascular


Rhythm: regular


Heart Sounds: Present: S1 & S2.  Absent: gallop, rub





- Extremities


Extremities: no ischemia, No edema, Full ROM





- Abdominal


General gastrointestinal: soft, non-tender, non-distended, normal bowel sounds





- Integumentary


Integumentary: Present: clear, warm, dry





- Neurologic


Neurologic: CNII-XII intact, moves all extremities





Results





- Labs


CBC & Chem 7: 


 08/25/18 05:43





 08/25/18 05:43


Labs: 


 Laboratory Last Values











WBC  5.9 K/mm3 (4.5-11.0)   08/25/18  05:43    


 


RBC  4.40 M/mm3 (3.65-5.03)   08/25/18  05:43    


 


Hgb  12.4 gm/dl (11.8-15.2)   08/25/18  05:43    


 


Hct  35.1 % (35.5-45.6)  L  08/25/18  05:43    


 


MCV  80 fl (84-94)  L  08/25/18  05:43    


 


MCH  28 pg (28-32)   08/25/18  05:43    


 


MCHC  35 % (32-34)  H  08/25/18  05:43    


 


RDW  14.5 % (13.2-15.2)   08/25/18  05:43    


 


Plt Count  197 K/mm3 (140-440)   08/25/18  05:43    


 


Lymph % (Auto)  15.1 % (13.4-35.0)   08/25/18  05:43    


 


Mono % (Auto)  8.1 % (0.0-7.3)  H  08/25/18  05:43    


 


Eos % (Auto)  2.2 % (0.0-4.3)   08/25/18  05:43    


 


Baso % (Auto)  0.4 % (0.0-1.8)   08/25/18  05:43    


 


Lymph #  0.9 K/mm3 (1.2-5.4)  L  08/25/18  05:43    


 


Mono #  0.5 K/mm3 (0.0-0.8)   08/25/18  05:43    


 


Eos #  0.1 K/mm3 (0.0-0.4)   08/25/18  05:43    


 


Baso #  0.0 K/mm3 (0.0-0.1)   08/25/18  05:43    


 


Total Counted  Cancelled   08/19/18  17:27    


 


Seg Neutrophils %  74.2 % (40.0-70.0)  H  08/25/18  05:43    


 


Seg Neuts % (Manual)  Cancelled   08/19/18  17:27    


 


Band Neutrophils %  Cancelled   08/19/18  17:27    


 


Lymphocytes % (Manual)  Cancelled   08/19/18  17:27    


 


Reactive Lymphs % (Man)  Cancelled   08/19/18  17:27    


 


Monocytes % (Manual)  Cancelled   08/19/18  17:27    


 


Eosinophils % (Manual)  Cancelled   08/19/18  17:27    


 


Basophils % (Manual)  Cancelled   08/19/18  17:27    


 


Metamyelocytes %  Cancelled   08/19/18  17:27    


 


Myelocytes %  Cancelled   08/19/18  17:27    


 


Promyelocytes %  Cancelled   08/19/18  17:27    


 


Blast Cells %  Cancelled   08/19/18  17:27    


 


Nucleated RBC %  Cancelled   08/19/18  17:27    


 


Seg Neutrophils #  4.4 K/mm3 (1.8-7.7)   08/25/18  05:43    


 


Seg Neutrophils # Man  Cancelled   08/19/18  17:27    


 


Band Neutrophils #  Cancelled   08/19/18  17:27    


 


Lymphocytes # (Manual)  Cancelled   08/19/18  17:27    


 


Abs React Lymphs (Man)  Cancelled   08/19/18  17:27    


 


Monocytes # (Manual)  Cancelled   08/19/18  17:27    


 


Eosinophils # (Manual)  Cancelled   08/19/18  17:27    


 


Basophils # (Manual)  Cancelled   08/19/18  17:27    


 


Metamyelocytes #  Cancelled   08/19/18  17:27    


 


Myelocytes #  Cancelled   08/19/18  17:27    


 


Promyelocytes #  Cancelled   08/19/18  17:27    


 


Blast Cells #  Cancelled   08/19/18  17:27    


 


WBC Morphology  Cancelled   08/19/18  17:27    


 


Hypersegmented Neuts  Cancelled   08/19/18  17:27    


 


Hyposegmented Neuts  Cancelled   08/19/18  17:27    


 


Hypogranular Neuts  Cancelled   08/19/18  17:27    


 


Hypersegmented Polys  Cancelled   08/19/18  17:27    


 


Smudge Cells  Cancelled   08/19/18  17:27    


 


Toxic Granulation  Cancelled   08/19/18  17:27    


 


Toxic Vacuolation  Cancelled   08/19/18  17:27    


 


Dohle Bodies  Cancelled   08/19/18  17:27    


 


Pelger-Huet Anomaly  Cancelled   08/19/18  17:27    


 


Phillip Rods  Cancelled   08/19/18  17:27    


 


Platelet Estimate  Cancelled   08/19/18  17:27    


 


Clumped Platelets  Cancelled   08/19/18  17:27    


 


Plt Clumps, EDTA  Cancelled   08/19/18  17:27    


 


Large Platelets  Cancelled   08/19/18  17:27    


 


Giant Platelets  Cancelled   08/19/18  17:27    


 


Platelet Satelliting  Cancelled   08/19/18  17:27    


 


Plt Morphology Comment  Cancelled   08/19/18  17:27    


 


RBC Morphology  Cancelled   08/19/18  17:27    


 


Dimorphic RBCs  Cancelled   08/19/18  17:27    


 


Polychromasia  Cancelled   08/19/18  17:27    


 


Hypochromasia  Cancelled   08/19/18  17:27    


 


Poikilocytosis  Cancelled   08/19/18  17:27    


 


Basophilic Stippling  Cancelled   08/19/18  17:27    


 


Anisocytosis  Cancelled   08/19/18  17:27    


 


Microcytosis  Cancelled   08/19/18  17:27    


 


Macrocytosis  Cancelled   08/19/18  17:27    


 


Spherocytes  Cancelled   08/19/18  17:27    


 


Pappenheimer Bodies  Cancelled   08/19/18  17:27    


 


Sickle Cells  Cancelled   08/19/18  17:27    


 


Target Cells  Cancelled   08/19/18  17:27    


 


Tear Drop Cells  Cancelled   08/19/18  17:27    


 


Ovalocytes  Cancelled   08/19/18  17:27    


 


Stomatocytes  Cancelled   08/19/18  17:27    


 


Helmet Cells  Cancelled   08/19/18  17:27    


 


Burns-Jolly Bodies  Cancelled   08/19/18  17:27    


 


Cabot Rings  Cancelled   08/19/18  17:27    


 


Tamy Cells  Cancelled   08/19/18  17:27    


 


Bite Cells  Cancelled   08/19/18  17:27    


 


Crenated Cell  Cancelled   08/19/18  17:27    


 


Elliptocytes  Cancelled   08/19/18  17:27    


 


Acanthocytes (Spur)  Cancelled   08/19/18  17:27    


 


Rouleaux  Cancelled   08/19/18  17:27    


 


Hemoglobin C Crystals  Cancelled   08/19/18  17:27    


 


Schistocytes  Cancelled   08/19/18  17:27    


 


Malaria parasites  Cancelled   08/19/18  17:27    


 


Abhishek Bodies  Cancelled   08/19/18  17:27    


 


Hem Pathologist Commnt  Cancelled   08/19/18  17:27    


 


Sodium  135 mmol/L (137-145)  L  08/25/18  05:43    


 


Potassium  3.7 mmol/L (3.6-5.0)   08/25/18  05:43    


 


Chloride  99.9 mmol/L ()   08/25/18  05:43    


 


Carbon Dioxide  24 mmol/L (22-30)   08/25/18  05:43    


 


Anion Gap  15 mmol/L  08/25/18  05:43    


 


BUN  2 mg/dL (9-20)  L  08/25/18  05:43    


 


Creatinine  0.6 mg/dL (0.8-1.5)  L  08/25/18  05:43    


 


Estimated GFR  > 60 ml/min  08/25/18  05:43    


 


BUN/Creatinine Ratio  3 %  08/25/18  05:43    


 


Glucose  105 mg/dL ()  H  08/25/18  05:43    


 


POC Glucose  140  ()  H  08/19/18  18:07    


 


Calcium  9.0 mg/dL (8.4-10.2)   08/25/18  05:43    


 


Magnesium  2.20 mg/dL (1.7-2.3)   08/19/18  17:24    


 


Total Bilirubin  1.20 mg/dL (0.1-1.2)   08/19/18  17:27    


 


AST  50 units/L (5-40)  H  08/19/18  17:27    


 


ALT  63 units/L (7-56)  H  08/19/18  17:27    


 


Alkaline Phosphatase  67 units/L ()   08/19/18  17:27    


 


Ammonia  17.0 umol/L (25-60)  L  08/19/18  17:27    


 


Total Creatine Kinase  148 units/L ()   08/19/18  17:44    


 


CK-MB (CK-2)  1.6 ng/mL (0.0-4.0)   08/19/18  17:44    


 


CK-MB (CK-2) Rel Index  1.0  (0-4)   08/19/18  17:44    


 


Troponin T  < 0.010 ng/mL (0.00-0.029)   08/19/18  17:43    


 


Total Protein  7.2 g/dL (6.3-8.2)   08/19/18  17:27    


 


Albumin  3.6 g/dL (3.9-5)  L  08/19/18  17:27    


 


Albumin/Globulin Ratio  1.0 %  08/19/18  17:27    


 


TSH  3.030 mlU/mL (0.270-4.200)   08/19/18  17:27    


 


Free T4  0.92 ng/dL (0.76-1.46)   08/19/18  17:27    


 


Urine Color  Shamika  (Yellow)   08/19/18  17:59    


 


Urine Turbidity  Cloudy  (Clear)   08/19/18  17:59    


 


Urine pH  7.0  (5.0-7.0)   08/19/18  17:59    


 


Ur Specific Gravity  1.021  (1.003-1.030)   08/19/18  17:59    


 


Urine Protein  30 mg/dl mg/dL (Negative)   08/19/18  17:59    


 


Urine Glucose (UA)  Neg mg/dL (Negative)   08/19/18  17:59    


 


Urine Ketones  Neg mg/dL (Negative)   08/19/18  17:59    


 


Urine Blood  Neg  (Negative)   08/19/18  17:59    


 


Urine Nitrite  Neg  (Negative)   08/19/18  17:59    


 


Urine Bilirubin  Neg  (Negative)   08/19/18  17:59    


 


Urine Urobilinogen  4.0 mg/dL (<2.0)   08/19/18  17:59    


 


Ur Leukocyte Esterase  Lg  (Negative)   08/19/18  17:59    


 


Urine WBC (Auto)  92.0 /HPF (0.0-6.0)  H  08/19/18  17:59    


 


Urine RBC (Auto)  6.0 /HPF (0.0-6.0)   08/19/18  17:59    


 


U Epithel Cells (Auto)  < 1.0 /HPF (0-13.0)   08/19/18  17:59    


 


Urine Bacteria (Auto)  3+ /HPF (Negative)   08/19/18  17:59    


 


Ur Transition Epith Cell  2 /HPF  08/19/18  17:59    


 


Urine Mucus  1+ /HPF  08/19/18  17:59    


 


Salicylates  < 0.3 mg/dL (2.8-20.0)  L  08/19/18  17:27    


 


Urine Opiates Screen  Presumptive negative   08/19/18  17:59    


 


Urine Methadone Screen  Presumptive negative   08/19/18  17:59    


 


Acetaminophen  < 5.0 ug/mL (10.0-30.0)  L  08/19/18  17:27    


 


Ur Barbiturates Screen  Presumptive negative   08/19/18  17:59    


 


Ur Phencyclidine Scrn  Presumptive negative   08/19/18  17:59    


 


Ur Amphetamines Screen  Presumptive negative   08/19/18  17:59    


 


U Benzodiazepines Scrn  Presumptive negative   08/19/18  17:59    


 


Urine Cocaine Screen  Presumptive negative   08/19/18  17:59    


 


U Marijuana (THC) Screen  Presumptive negative   08/19/18  17:59    


 


Drugs of Abuse Note  Disclamer   08/19/18  17:59    


 


Plasma/Serum Alcohol  < 0.01 % (0-0.07)   08/19/18  17:27

## 2018-08-25 NOTE — PROGRESS NOTE
Subjective





- Reason for Consult


Consult date: 08/25/18


Reason for consult: Psychiatric Follow-up Evaluation





- Chief Complaint


Chief complaint: 


"The patient is nonverbal"





Patient is a 58-year-old  male who presents to the ER for AMS 

from [place]. The psychiatry team was consulted to see patient for catatonic 

like state. Today the patient is nonverbal during the assessment. He does not 

respond to commands, but grimace to the sternum rub. Per staff patient has been 

asleep all day. Patient is known to provider. Although patient is arousable, he 

continues to be nonverbal. No agitation/irritation noted. Per RN, Ativan has 

not been given due to the fact that patient is unresponsive. Patient condition 

is unchanged.  





Mental Status Exam





- Vital signs


 Last Vital Signs











Temp  98.5 F   08/25/18 11:48


 


Pulse  93 H  08/25/18 11:48


 


Resp  18   08/25/18 11:48


 


BP  119/79   08/25/18 11:48


 


Pulse Ox  94   08/25/18 11:48














- Exam


Narrative exam: 


Unable to complete the MSE because of the patient's condition.








Assessment and Plan


Impression: R/O Catatonia. Today the patient is still nonverbal during the 

assessment. Being that the provider knows patient hx, it appears that patient 

may be having a possible adverse reaction from the long acting injectable, 

[place]. Per family patient begin to decompensate or have a change in mental 

status after receiving the injection. Immediately after receiving the injection 

patient other anti-psychotics ( Zyprexa) were discontinued.  





Recommendation/Plan: 


1. Continue 1013 and reassess in 24 hours. 


2. Will taper/decrease  Ativan 1 mg IM BID for catatonia. Recommend GI 

Prophylaxis and continue hydration. Monitor patient's V/S and airway. Will 

follow up with patient daily to R/O NMS and Serotonin Syndrome. 


3. Once Ativan is tapered off, psychiatry will sign off. 


4. Will continue to monitor mood, catatonia, sleep, appetite, compliance, and 

side effects.

## 2018-08-26 RX ADMIN — CEFTRIAXONE SODIUM SCH MLS/HR: 1 INJECTION, POWDER, FOR SOLUTION INTRAMUSCULAR; INTRAVENOUS at 14:50

## 2018-08-26 RX ADMIN — DEXTROSE AND SODIUM CHLORIDE SCH MLS/HR: 5; .45 INJECTION, SOLUTION INTRAVENOUS at 14:49

## 2018-08-26 RX ADMIN — HEPARIN SODIUM SCH UNIT: 5000 INJECTION, SOLUTION INTRAVENOUS; SUBCUTANEOUS at 14:51

## 2018-08-26 RX ADMIN — LORAZEPAM SCH MG: 2 INJECTION INTRAMUSCULAR; INTRAVENOUS at 14:50

## 2018-08-26 NOTE — XRAY REPORT
FINAL REPORT



EXAM:  XR ABDOMEN 1V AP



HISTORY:  Nasogastric tube placement 



TECHNIQUE:  Frontal portable view of the lower chest, abdomen and

pelvis 



Comparison: X-ray abdomen performed earlier today at 13:15 



FINDINGS:  

The esophagogastric tube has been advanced with the tip and side

port now positioned more distally in the region of the stomach.



There continues to be mildly distended air-filled loops of bowel

in the abdomen and pelvis. 



There is no evidence of pneumoperitoneum nor organomegaly. 



The lung bases and bony structures are unremarkable. 



IMPRESSION:  

1. Tip of the esophagogastric tube projected to be in

satisfactory position after advancement.

## 2018-08-26 NOTE — PROGRESS NOTE
Assessment and Plan


Assessment and plan: 





Catatonia


- mental health following. Continue 1013. Continue Ativan 1 mg IM Q8hrs for 

catatonia. 





Metabolic encephalopathy


- Supportive care.  EEG reveals diffuse moderate encephalopathy.  MRI negative





UTI


-Urine culture reveals Citrobacter that is essentially pansensitive.  Follow-up 

blood cultures.





Dehydration/prot charles malnutrition secondary to #1


- Patient is on D5 half-normal saline, condition is not taking anything by 

mouth.  Place Dobhoff for TF.





Suspected femoral fracture


-Evaluated by orthopedics, reviewed his imaging and showed no fracture





DVT prophylaxis








History


Interval history: 





No new issues overnight.





Hospitalist Physical





- Constitutional


Vitals: 


 











Temp Pulse Resp BP Pulse Ox


 


 97.6 F   86   18   116/70   97 


 


 08/26/18 00:28  08/26/18 00:28  08/26/18 00:28  08/26/18 00:28  08/26/18 00:28











General appearance: Present: no acute distress, other (catatonic)





- EENT


Eyes: Present: PERRL, EOM intact


ENT: hearing intact, clear oral mucosa, dentition normal





- Neck


Neck: Present: supple, normal ROM





- Respiratory


Respiratory effort: normal


Respiratory: bilateral: CTA





- Cardiovascular


Rhythm: regular


Heart Sounds: Present: S1 & S2.  Absent: gallop, rub





- Extremities


Extremities: no ischemia, No edema, Full ROM





- Abdominal


General gastrointestinal: soft, non-tender, non-distended, normal bowel sounds





- Integumentary


Integumentary: Present: clear, warm, dry





- Neurologic


Neurologic: CNII-XII intact, moves all extremities





Results





- Labs


CBC & Chem 7: 


 08/25/18 05:43





 08/25/18 05:43


Labs: 


 Laboratory Last Values











WBC  5.9 K/mm3 (4.5-11.0)   08/25/18  05:43    


 


RBC  4.40 M/mm3 (3.65-5.03)   08/25/18  05:43    


 


Hgb  12.4 gm/dl (11.8-15.2)   08/25/18  05:43    


 


Hct  35.1 % (35.5-45.6)  L  08/25/18  05:43    


 


MCV  80 fl (84-94)  L  08/25/18  05:43    


 


MCH  28 pg (28-32)   08/25/18  05:43    


 


MCHC  35 % (32-34)  H  08/25/18  05:43    


 


RDW  14.5 % (13.2-15.2)   08/25/18  05:43    


 


Plt Count  197 K/mm3 (140-440)   08/25/18  05:43    


 


Lymph % (Auto)  15.1 % (13.4-35.0)   08/25/18  05:43    


 


Mono % (Auto)  8.1 % (0.0-7.3)  H  08/25/18  05:43    


 


Eos % (Auto)  2.2 % (0.0-4.3)   08/25/18  05:43    


 


Baso % (Auto)  0.4 % (0.0-1.8)   08/25/18  05:43    


 


Lymph #  0.9 K/mm3 (1.2-5.4)  L  08/25/18  05:43    


 


Mono #  0.5 K/mm3 (0.0-0.8)   08/25/18  05:43    


 


Eos #  0.1 K/mm3 (0.0-0.4)   08/25/18  05:43    


 


Baso #  0.0 K/mm3 (0.0-0.1)   08/25/18  05:43    


 


Total Counted  Cancelled   08/19/18  17:27    


 


Seg Neutrophils %  74.2 % (40.0-70.0)  H  08/25/18  05:43    


 


Seg Neuts % (Manual)  Cancelled   08/19/18  17:27    


 


Band Neutrophils %  Cancelled   08/19/18  17:27    


 


Lymphocytes % (Manual)  Cancelled   08/19/18  17:27    


 


Reactive Lymphs % (Man)  Cancelled   08/19/18  17:27    


 


Monocytes % (Manual)  Cancelled   08/19/18  17:27    


 


Eosinophils % (Manual)  Cancelled   08/19/18  17:27    


 


Basophils % (Manual)  Cancelled   08/19/18  17:27    


 


Metamyelocytes %  Cancelled   08/19/18  17:27    


 


Myelocytes %  Cancelled   08/19/18  17:27    


 


Promyelocytes %  Cancelled   08/19/18  17:27    


 


Blast Cells %  Cancelled   08/19/18  17:27    


 


Nucleated RBC %  Cancelled   08/19/18  17:27    


 


Seg Neutrophils #  4.4 K/mm3 (1.8-7.7)   08/25/18  05:43    


 


Seg Neutrophils # Man  Cancelled   08/19/18  17:27    


 


Band Neutrophils #  Cancelled   08/19/18  17:27    


 


Lymphocytes # (Manual)  Cancelled   08/19/18  17:27    


 


Abs React Lymphs (Man)  Cancelled   08/19/18  17:27    


 


Monocytes # (Manual)  Cancelled   08/19/18  17:27    


 


Eosinophils # (Manual)  Cancelled   08/19/18  17:27    


 


Basophils # (Manual)  Cancelled   08/19/18  17:27    


 


Metamyelocytes #  Cancelled   08/19/18  17:27    


 


Myelocytes #  Cancelled   08/19/18  17:27    


 


Promyelocytes #  Cancelled   08/19/18  17:27    


 


Blast Cells #  Cancelled   08/19/18  17:27    


 


WBC Morphology  Cancelled   08/19/18  17:27    


 


Hypersegmented Neuts  Cancelled   08/19/18  17:27    


 


Hyposegmented Neuts  Cancelled   08/19/18  17:27    


 


Hypogranular Neuts  Cancelled   08/19/18  17:27    


 


Hypersegmented Polys  Cancelled   08/19/18  17:27    


 


Smudge Cells  Cancelled   08/19/18  17:27    


 


Toxic Granulation  Cancelled   08/19/18  17:27    


 


Toxic Vacuolation  Cancelled   08/19/18  17:27    


 


Dohle Bodies  Cancelled   08/19/18  17:27    


 


Pelger-Huet Anomaly  Cancelled   08/19/18  17:27    


 


Phillip Rods  Cancelled   08/19/18  17:27    


 


Platelet Estimate  Cancelled   08/19/18  17:27    


 


Clumped Platelets  Cancelled   08/19/18  17:27    


 


Plt Clumps, EDTA  Cancelled   08/19/18  17:27    


 


Large Platelets  Cancelled   08/19/18  17:27    


 


Giant Platelets  Cancelled   08/19/18  17:27    


 


Platelet Satelliting  Cancelled   08/19/18  17:27    


 


Plt Morphology Comment  Cancelled   08/19/18  17:27    


 


RBC Morphology  Cancelled   08/19/18  17:27    


 


Dimorphic RBCs  Cancelled   08/19/18  17:27    


 


Polychromasia  Cancelled   08/19/18  17:27    


 


Hypochromasia  Cancelled   08/19/18  17:27    


 


Poikilocytosis  Cancelled   08/19/18  17:27    


 


Basophilic Stippling  Cancelled   08/19/18  17:27    


 


Anisocytosis  Cancelled   08/19/18  17:27    


 


Microcytosis  Cancelled   08/19/18  17:27    


 


Macrocytosis  Cancelled   08/19/18  17:27    


 


Spherocytes  Cancelled   08/19/18  17:27    


 


Pappenheimer Bodies  Cancelled   08/19/18  17:27    


 


Sickle Cells  Cancelled   08/19/18  17:27    


 


Target Cells  Cancelled   08/19/18  17:27    


 


Tear Drop Cells  Cancelled   08/19/18  17:27    


 


Ovalocytes  Cancelled   08/19/18  17:27    


 


Stomatocytes  Cancelled   08/19/18  17:27    


 


Helmet Cells  Cancelled   08/19/18  17:27    


 


Burns-Jolly Bodies  Cancelled   08/19/18  17:27    


 


Cabot Rings  Cancelled   08/19/18  17:27    


 


Thayer Cells  Cancelled   08/19/18  17:27    


 


Bite Cells  Cancelled   08/19/18  17:27    


 


Crenated Cell  Cancelled   08/19/18  17:27    


 


Elliptocytes  Cancelled   08/19/18  17:27    


 


Acanthocytes (Spur)  Cancelled   08/19/18  17:27    


 


Rouleaux  Cancelled   08/19/18  17:27    


 


Hemoglobin C Crystals  Cancelled   08/19/18  17:27    


 


Schistocytes  Cancelled   08/19/18  17:27    


 


Malaria parasites  Cancelled   08/19/18  17:27    


 


Abhishek Bodies  Cancelled   08/19/18  17:27    


 


Hem Pathologist Commnt  Cancelled   08/19/18  17:27    


 


Sodium  135 mmol/L (137-145)  L  08/25/18  05:43    


 


Potassium  3.7 mmol/L (3.6-5.0)   08/25/18  05:43    


 


Chloride  99.9 mmol/L ()   08/25/18  05:43    


 


Carbon Dioxide  24 mmol/L (22-30)   08/25/18  05:43    


 


Anion Gap  15 mmol/L  08/25/18  05:43    


 


BUN  2 mg/dL (9-20)  L  08/25/18  05:43    


 


Creatinine  0.6 mg/dL (0.8-1.5)  L  08/25/18  05:43    


 


Estimated GFR  > 60 ml/min  08/25/18  05:43    


 


BUN/Creatinine Ratio  3 %  08/25/18  05:43    


 


Glucose  105 mg/dL ()  H  08/25/18  05:43    


 


POC Glucose  140  ()  H  08/19/18  18:07    


 


Calcium  9.0 mg/dL (8.4-10.2)   08/25/18  05:43    


 


Magnesium  2.20 mg/dL (1.7-2.3)   08/19/18  17:24    


 


Total Bilirubin  1.20 mg/dL (0.1-1.2)   08/19/18  17:27    


 


AST  50 units/L (5-40)  H  08/19/18  17:27    


 


ALT  63 units/L (7-56)  H  08/19/18  17:27    


 


Alkaline Phosphatase  67 units/L ()   08/19/18  17:27    


 


Ammonia  42.0 umol/L (25-60)   08/25/18  15:17    


 


Total Creatine Kinase  148 units/L ()   08/19/18  17:44    


 


CK-MB (CK-2)  1.6 ng/mL (0.0-4.0)   08/19/18  17:44    


 


CK-MB (CK-2) Rel Index  1.0  (0-4)   08/19/18  17:44    


 


Troponin T  < 0.010 ng/mL (0.00-0.029)   08/19/18  17:43    


 


Total Protein  7.2 g/dL (6.3-8.2)   08/19/18  17:27    


 


Albumin  3.6 g/dL (3.9-5)  L  08/19/18  17:27    


 


Albumin/Globulin Ratio  1.0 %  08/19/18  17:27    


 


Vitamin B12  870.0 pg/mL (211-911)   08/25/18  15:13    


 


Folate  6.55 ng/mL (7.3-26.0)  L  08/25/18  15:15    


 


TSH  2.910 mlU/mL (0.270-4.200)   08/25/18  15:16    


 


Free T4  0.92 ng/dL (0.76-1.46)   08/19/18  17:27    


 


Urine Color  Shamika  (Yellow)   08/19/18  17:59    


 


Urine Turbidity  Cloudy  (Clear)   08/19/18  17:59    


 


Urine pH  7.0  (5.0-7.0)   08/19/18  17:59    


 


Ur Specific Gravity  1.021  (1.003-1.030)   08/19/18  17:59    


 


Urine Protein  30 mg/dl mg/dL (Negative)   08/19/18  17:59    


 


Urine Glucose (UA)  Neg mg/dL (Negative)   08/19/18  17:59    


 


Urine Ketones  Neg mg/dL (Negative)   08/19/18  17:59    


 


Urine Blood  Neg  (Negative)   08/19/18  17:59    


 


Urine Nitrite  Neg  (Negative)   08/19/18  17:59    


 


Urine Bilirubin  Neg  (Negative)   08/19/18  17:59    


 


Urine Urobilinogen  4.0 mg/dL (<2.0)   08/19/18  17:59    


 


Ur Leukocyte Esterase  Lg  (Negative)   08/19/18  17:59    


 


Urine WBC (Auto)  92.0 /HPF (0.0-6.0)  H  08/19/18  17:59    


 


Urine RBC (Auto)  6.0 /HPF (0.0-6.0)   08/19/18  17:59    


 


U Epithel Cells (Auto)  < 1.0 /HPF (0-13.0)   08/19/18  17:59    


 


Urine Bacteria (Auto)  3+ /HPF (Negative)   08/19/18  17:59    


 


Ur Transition Epith Cell  2 /HPF  08/19/18  17:59    


 


Urine Mucus  1+ /HPF  08/19/18  17:59    


 


Salicylates  < 0.3 mg/dL (2.8-20.0)  L  08/19/18  17:27    


 


Urine Opiates Screen  Presumptive negative   08/19/18  17:59    


 


Urine Methadone Screen  Presumptive negative   08/19/18  17:59    


 


Acetaminophen  < 5.0 ug/mL (10.0-30.0)  L  08/19/18  17:27    


 


Ur Barbiturates Screen  Presumptive negative   08/19/18  17:59    


 


Ur Phencyclidine Scrn  Presumptive negative   08/19/18  17:59    


 


Ur Amphetamines Screen  Presumptive negative   08/19/18  17:59    


 


U Benzodiazepines Scrn  Presumptive negative   08/19/18  17:59    


 


Urine Cocaine Screen  Presumptive negative   08/19/18  17:59    


 


U Marijuana (THC) Screen  Presumptive negative   08/19/18  17:59    


 


Drugs of Abuse Note  Disclamer   08/19/18  17:59    


 


Plasma/Serum Alcohol  < 0.01 % (0-0.07)   08/19/18  17:27

## 2018-08-26 NOTE — PROGRESS NOTE
Subjective





- Reason for Consult


Consult date: 08/26/18


Reason for consult: follow up





- Chief Complaint


Chief complaint: 


"The patient is nonverbal"





Patient is a 58-year-old  male who presents to the ER for AMS 

from [place]. The psychiatry team was consulted to see patient for catatonic 

like state. Today the patient is nonverbal during the assessment. Today the 

patient is still nonverbal during the assessment. His nurse stated Mr. Benjamin 

was interacting with family/smiling this afternoon. She also reported he pulled 

out his dobhoff. The record indicates he received ativan 1mg IM around 2pm 

today.  





Mental Status Exam





- Vital signs


 Last Vital Signs











Temp  97.7 F   08/26/18 17:23


 


Pulse  84   08/26/18 17:23


 


Resp  20   08/26/18 17:23


 


BP  108/75   08/26/18 17:23


 


Pulse Ox  98   08/26/18 17:23














- Exam


Narrative exam: 





Unable to complete the MSE because of the patient's condition.





Assessment and Plan


Impression: R/O Catatonia. Today the patient is still nonverbal during the 

assessment. His nurse stated Mr. Benjamin was interacting with family/smiling this 

afternoon. She also reported he pulled out his dobhoff. The record indicates he 

received ativan 1mg IM around 2pm today.





There was concern about a possible reaction to Aristada. If this was his first 

injection, an oral antipsychotic at the same dosing would have been indicated 

for the first 21 days. Some patients require adjustments in dosing during the 

21 day period. Review of the record indicates he showed signs of decompensation/

psychotic symptoms shortly after these changes. He received multiple 

antipsychotic injections while he was hospitalized at the [facility] facility. He is 

at risk for adverse effects, such as NMS. 





It is unclear if his presentation is catatonic. 

















Recommendation/Plan: 


Continue 1013 and reassess in 24 hours. 


Recommend GI Prophylaxis and continue hydration. Monitor patient's V/S and 

airway. Will follow up with patient daily to R/O NMS and Serotonin Syndrome. 








A higher dose/ativan challenge may be needed.

## 2018-08-26 NOTE — XRAY REPORT
FINAL REPORT



EXAM:  XRAY ABDOMEN 1 VIEW



HISTORY:  dobbhoff placement 



TECHNIQUE:  Single view of the abdomen.



PRIORS:  None currently available.



FINDINGS:  

Enteric tube tip is within proximal stomach.



Mild gas is distension of the large and small bowel loops. 



There is no pneumoperitoneum. 



There is no air fluid level. 



There is no obstructive pattern.



Mild stool is present. 



There are no suspicious calcifications overlying the renal

shadows.



IMPRESSION:  

Nonspecific nonobstructive bowel gas pattern.

## 2018-08-27 RX ADMIN — HEPARIN SODIUM SCH UNIT: 5000 INJECTION, SOLUTION INTRAVENOUS; SUBCUTANEOUS at 21:44

## 2018-08-27 RX ADMIN — LORAZEPAM SCH MG: 2 INJECTION INTRAMUSCULAR; INTRAVENOUS at 10:24

## 2018-08-27 RX ADMIN — CEFTRIAXONE SODIUM SCH MLS/HR: 1 INJECTION, POWDER, FOR SOLUTION INTRAMUSCULAR; INTRAVENOUS at 11:08

## 2018-08-27 RX ADMIN — HEPARIN SODIUM SCH UNIT: 5000 INJECTION, SOLUTION INTRAVENOUS; SUBCUTANEOUS at 10:26

## 2018-08-27 RX ADMIN — LORAZEPAM SCH MG: 2 INJECTION INTRAMUSCULAR; INTRAVENOUS at 00:13

## 2018-08-27 RX ADMIN — HEPARIN SODIUM SCH UNIT: 5000 INJECTION, SOLUTION INTRAVENOUS; SUBCUTANEOUS at 00:14

## 2018-08-27 NOTE — PROGRESS NOTE
Assessment and Plan


Assessment and plan: 





Catatonia


- mental health following. Continue 1013. Continue Ativan 1 mg IM Q8hrs for 

catatonia. 





Metabolic encephalopathy


- Supportive care.  EEG reveals diffuse moderate encephalopathy.  MRI negative.





UTI


-Urine culture reveals Citrobacter that is essentially pansensitive.  Blood 

cultures are negative





Dehydration/prot charles malnutrition secondary to #1


- Given current condition, pt is not taking anything by mouth.  Cont. Dobhoff 

TF.





Suspected femoral fracture


-Evaluated by orthopedics, reviewed his imaging and showed no fracture





DVT prophylaxis








History


Interval history: 





No new issues overnight.





Hospitalist Physical





- Constitutional


Vitals: 


 











Temp Pulse Resp BP Pulse Ox


 


 99.0 F   89   20   104/53   100 


 


 08/27/18 06:52  08/26/18 23:15  08/27/18 06:52  08/27/18 06:52  08/26/18 23:15











General appearance: Present: no acute distress, other (catatonic)





- EENT


Eyes: Present: PERRL, EOM intact


ENT: hearing intact, clear oral mucosa, dentition normal





- Neck


Neck: Present: supple, normal ROM





- Respiratory


Respiratory effort: normal


Respiratory: bilateral: CTA





- Cardiovascular


Rhythm: regular


Heart Sounds: Present: S1 & S2.  Absent: gallop, rub





- Extremities


Extremities: no ischemia, No edema, Full ROM





- Abdominal


General gastrointestinal: soft, non-tender, non-distended, normal bowel sounds





- Integumentary


Integumentary: Present: clear, warm, dry





- Neurologic


Neurologic: CNII-XII intact, moves all extremities





Results





- Labs


CBC & Chem 7: 


 08/25/18 05:43





 08/25/18 05:43


Labs: 


 Laboratory Last Values











WBC  5.9 K/mm3 (4.5-11.0)   08/25/18  05:43    


 


RBC  4.40 M/mm3 (3.65-5.03)   08/25/18  05:43    


 


Hgb  12.4 gm/dl (11.8-15.2)   08/25/18  05:43    


 


Hct  35.1 % (35.5-45.6)  L  08/25/18  05:43    


 


MCV  80 fl (84-94)  L  08/25/18  05:43    


 


MCH  28 pg (28-32)   08/25/18  05:43    


 


MCHC  35 % (32-34)  H  08/25/18  05:43    


 


RDW  14.5 % (13.2-15.2)   08/25/18  05:43    


 


Plt Count  197 K/mm3 (140-440)   08/25/18  05:43    


 


Lymph % (Auto)  15.1 % (13.4-35.0)   08/25/18  05:43    


 


Mono % (Auto)  8.1 % (0.0-7.3)  H  08/25/18  05:43    


 


Eos % (Auto)  2.2 % (0.0-4.3)   08/25/18  05:43    


 


Baso % (Auto)  0.4 % (0.0-1.8)   08/25/18  05:43    


 


Lymph #  0.9 K/mm3 (1.2-5.4)  L  08/25/18  05:43    


 


Mono #  0.5 K/mm3 (0.0-0.8)   08/25/18  05:43    


 


Eos #  0.1 K/mm3 (0.0-0.4)   08/25/18  05:43    


 


Baso #  0.0 K/mm3 (0.0-0.1)   08/25/18  05:43    


 


Total Counted  Cancelled   08/19/18  17:27    


 


Seg Neutrophils %  74.2 % (40.0-70.0)  H  08/25/18  05:43    


 


Seg Neuts % (Manual)  Cancelled   08/19/18  17:27    


 


Band Neutrophils %  Cancelled   08/19/18  17:27    


 


Lymphocytes % (Manual)  Cancelled   08/19/18  17:27    


 


Reactive Lymphs % (Man)  Cancelled   08/19/18  17:27    


 


Monocytes % (Manual)  Cancelled   08/19/18  17:27    


 


Eosinophils % (Manual)  Cancelled   08/19/18  17:27    


 


Basophils % (Manual)  Cancelled   08/19/18  17:27    


 


Metamyelocytes %  Cancelled   08/19/18  17:27    


 


Myelocytes %  Cancelled   08/19/18  17:27    


 


Promyelocytes %  Cancelled   08/19/18  17:27    


 


Blast Cells %  Cancelled   08/19/18  17:27    


 


Nucleated RBC %  Cancelled   08/19/18  17:27    


 


Seg Neutrophils #  4.4 K/mm3 (1.8-7.7)   08/25/18  05:43    


 


Seg Neutrophils # Man  Cancelled   08/19/18  17:27    


 


Band Neutrophils #  Cancelled   08/19/18  17:27    


 


Lymphocytes # (Manual)  Cancelled   08/19/18  17:27    


 


Abs React Lymphs (Man)  Cancelled   08/19/18  17:27    


 


Monocytes # (Manual)  Cancelled   08/19/18  17:27    


 


Eosinophils # (Manual)  Cancelled   08/19/18  17:27    


 


Basophils # (Manual)  Cancelled   08/19/18  17:27    


 


Metamyelocytes #  Cancelled   08/19/18  17:27    


 


Myelocytes #  Cancelled   08/19/18  17:27    


 


Promyelocytes #  Cancelled   08/19/18  17:27    


 


Blast Cells #  Cancelled   08/19/18  17:27    


 


WBC Morphology  Cancelled   08/19/18  17:27    


 


Hypersegmented Neuts  Cancelled   08/19/18  17:27    


 


Hyposegmented Neuts  Cancelled   08/19/18  17:27    


 


Hypogranular Neuts  Cancelled   08/19/18  17:27    


 


Hypersegmented Polys  Cancelled   08/19/18  17:27    


 


Smudge Cells  Cancelled   08/19/18  17:27    


 


Toxic Granulation  Cancelled   08/19/18  17:27    


 


Toxic Vacuolation  Cancelled   08/19/18  17:27    


 


Dohle Bodies  Cancelled   08/19/18  17:27    


 


Pelger-Huet Anomaly  Cancelled   08/19/18  17:27    


 


Phillip Rods  Cancelled   08/19/18  17:27    


 


Platelet Estimate  Cancelled   08/19/18  17:27    


 


Clumped Platelets  Cancelled   08/19/18  17:27    


 


Plt Clumps, EDTA  Cancelled   08/19/18  17:27    


 


Large Platelets  Cancelled   08/19/18  17:27    


 


Giant Platelets  Cancelled   08/19/18  17:27    


 


Platelet Satelliting  Cancelled   08/19/18  17:27    


 


Plt Morphology Comment  Cancelled   08/19/18  17:27    


 


RBC Morphology  Cancelled   08/19/18  17:27    


 


Dimorphic RBCs  Cancelled   08/19/18  17:27    


 


Polychromasia  Cancelled   08/19/18  17:27    


 


Hypochromasia  Cancelled   08/19/18  17:27    


 


Poikilocytosis  Cancelled   08/19/18  17:27    


 


Basophilic Stippling  Cancelled   08/19/18  17:27    


 


Anisocytosis  Cancelled   08/19/18  17:27    


 


Microcytosis  Cancelled   08/19/18  17:27    


 


Macrocytosis  Cancelled   08/19/18  17:27    


 


Spherocytes  Cancelled   08/19/18  17:27    


 


Pappenheimer Bodies  Cancelled   08/19/18  17:27    


 


Sickle Cells  Cancelled   08/19/18  17:27    


 


Target Cells  Cancelled   08/19/18  17:27    


 


Tear Drop Cells  Cancelled   08/19/18  17:27    


 


Ovalocytes  Cancelled   08/19/18  17:27    


 


Stomatocytes  Cancelled   08/19/18  17:27    


 


Helmet Cells  Cancelled   08/19/18  17:27    


 


Burns-Jolly Bodies  Cancelled   08/19/18  17:27    


 


Cabot Rings  Cancelled   08/19/18  17:27    


 


Tamy Cells  Cancelled   08/19/18  17:27    


 


Bite Cells  Cancelled   08/19/18  17:27    


 


Crenated Cell  Cancelled   08/19/18  17:27    


 


Elliptocytes  Cancelled   08/19/18  17:27    


 


Acanthocytes (Spur)  Cancelled   08/19/18  17:27    


 


Rouleaux  Cancelled   08/19/18  17:27    


 


Hemoglobin C Crystals  Cancelled   08/19/18  17:27    


 


Schistocytes  Cancelled   08/19/18  17:27    


 


Malaria parasites  Cancelled   08/19/18  17:27    


 


Abhishek Bodies  Cancelled   08/19/18  17:27    


 


Hem Pathologist Commnt  Cancelled   08/19/18  17:27    


 


Sodium  135 mmol/L (137-145)  L  08/25/18  05:43    


 


Potassium  3.7 mmol/L (3.6-5.0)   08/25/18  05:43    


 


Chloride  99.9 mmol/L ()   08/25/18  05:43    


 


Carbon Dioxide  24 mmol/L (22-30)   08/25/18  05:43    


 


Anion Gap  15 mmol/L  08/25/18  05:43    


 


BUN  2 mg/dL (9-20)  L  08/25/18  05:43    


 


Creatinine  0.6 mg/dL (0.8-1.5)  L  08/25/18  05:43    


 


Estimated GFR  > 60 ml/min  08/25/18  05:43    


 


BUN/Creatinine Ratio  3 %  08/25/18  05:43    


 


Glucose  105 mg/dL ()  H  08/25/18  05:43    


 


POC Glucose  140  ()  H  08/19/18  18:07    


 


Calcium  9.0 mg/dL (8.4-10.2)   08/25/18  05:43    


 


Magnesium  2.20 mg/dL (1.7-2.3)   08/19/18  17:24    


 


Total Bilirubin  1.20 mg/dL (0.1-1.2)   08/19/18  17:27    


 


AST  50 units/L (5-40)  H  08/19/18  17:27    


 


ALT  63 units/L (7-56)  H  08/19/18  17:27    


 


Alkaline Phosphatase  67 units/L ()   08/19/18  17:27    


 


Ammonia  42.0 umol/L (25-60)   08/25/18  15:17    


 


Total Creatine Kinase  148 units/L ()   08/19/18  17:44    


 


CK-MB (CK-2)  1.6 ng/mL (0.0-4.0)   08/19/18  17:44    


 


CK-MB (CK-2) Rel Index  1.0  (0-4)   08/19/18  17:44    


 


Troponin T  < 0.010 ng/mL (0.00-0.029)   08/19/18  17:43    


 


Total Protein  7.2 g/dL (6.3-8.2)   08/19/18  17:27    


 


Albumin  3.6 g/dL (3.9-5)  L  08/19/18  17:27    


 


Albumin/Globulin Ratio  1.0 %  08/19/18  17:27    


 


Vitamin B12  870.0 pg/mL (211-911)   08/25/18  15:13    


 


Folate  6.55 ng/mL (7.3-26.0)  L  08/25/18  15:15    


 


TSH  2.910 mlU/mL (0.270-4.200)   08/25/18  15:16    


 


Free T4  0.92 ng/dL (0.76-1.46)   08/19/18  17:27    


 


Urine Color  Shamika  (Yellow)   08/19/18  17:59    


 


Urine Turbidity  Cloudy  (Clear)   08/19/18  17:59    


 


Urine pH  7.0  (5.0-7.0)   08/19/18  17:59    


 


Ur Specific Gravity  1.021  (1.003-1.030)   08/19/18  17:59    


 


Urine Protein  30 mg/dl mg/dL (Negative)   08/19/18  17:59    


 


Urine Glucose (UA)  Neg mg/dL (Negative)   08/19/18  17:59    


 


Urine Ketones  Neg mg/dL (Negative)   08/19/18  17:59    


 


Urine Blood  Neg  (Negative)   08/19/18  17:59    


 


Urine Nitrite  Neg  (Negative)   08/19/18  17:59    


 


Urine Bilirubin  Neg  (Negative)   08/19/18  17:59    


 


Urine Urobilinogen  4.0 mg/dL (<2.0)   08/19/18  17:59    


 


Ur Leukocyte Esterase  Lg  (Negative)   08/19/18  17:59    


 


Urine WBC (Auto)  92.0 /HPF (0.0-6.0)  H  08/19/18  17:59    


 


Urine RBC (Auto)  6.0 /HPF (0.0-6.0)   08/19/18  17:59    


 


U Epithel Cells (Auto)  < 1.0 /HPF (0-13.0)   08/19/18  17:59    


 


Urine Bacteria (Auto)  3+ /HPF (Negative)   08/19/18  17:59    


 


Ur Transition Epith Cell  2 /HPF  08/19/18  17:59    


 


Urine Mucus  1+ /HPF  08/19/18  17:59    


 


Salicylates  < 0.3 mg/dL (2.8-20.0)  L  08/19/18  17:27    


 


Urine Opiates Screen  Presumptive negative   08/19/18  17:59    


 


Urine Methadone Screen  Presumptive negative   08/19/18  17:59    


 


Acetaminophen  < 5.0 ug/mL (10.0-30.0)  L  08/19/18  17:27    


 


Ur Barbiturates Screen  Presumptive negative   08/19/18  17:59    


 


Ur Phencyclidine Scrn  Presumptive negative   08/19/18  17:59    


 


Ur Amphetamines Screen  Presumptive negative   08/19/18  17:59    


 


U Benzodiazepines Scrn  Presumptive negative   08/19/18  17:59    


 


Urine Cocaine Screen  Presumptive negative   08/19/18  17:59    


 


U Marijuana (THC) Screen  Presumptive negative   08/19/18  17:59    


 


Drugs of Abuse Note  Disclamer   08/19/18  17:59    


 


Plasma/Serum Alcohol  < 0.01 % (0-0.07)   08/19/18  17:27

## 2018-08-27 NOTE — PROGRESS NOTE
Subjective





- Reason for Consult


Consult date: 08/27/18


Reason for consult: Psychiatry Follow-up





- Chief Complaint


Chief complaint: 


"The patient is still nonverbal"





Patient is a 58-year-old  male who presents to the ER for AMS 

from [place]. The psychiatry team was consulted to see patient for catatonic 

like state. There's no change to the patient's presentation. The patient is now 

on tube feeding nutritional intake. Per the staff, no agitation by the patient 

at this time. 





Mental Status Exam





- Vital signs


 Last Vital Signs











Temp  97.0 F L  08/27/18 13:05


 


Pulse  92 H  08/27/18 13:05


 


Resp  20   08/27/18 13:05


 


BP  109/78   08/27/18 13:05


 


Pulse Ox  98   08/27/18 13:05














- Exam


Narrative exam: 


Unable to complete the MSE because of the patient's condition.











Assessment and Plan


Impression: Catatonia has been ruled out. Today the patient is still nonverbal 

during the assessment. The patient had a possible adverse reaction from the 

long acting injectable, Aristada. There's no reversible agent for Aristada at 

this time. The patient is on tube feeding.





Recommendation/Plan: The patient's 1013 will not be extended. Will start Ativan 

taper on the patient (Ativan 1 mg IM daily starting 8/29/2018). Psychiatry will 

continue to see patient until the Ativan taper is complete. Informed Case Mgmt, 

the patient may need placement.

## 2018-08-28 RX ADMIN — HEPARIN SODIUM SCH UNIT: 5000 INJECTION, SOLUTION INTRAVENOUS; SUBCUTANEOUS at 22:13

## 2018-08-28 RX ADMIN — LORAZEPAM SCH: 2 INJECTION INTRAMUSCULAR; INTRAVENOUS at 00:03

## 2018-08-28 RX ADMIN — LORAZEPAM SCH MG: 2 INJECTION INTRAMUSCULAR; INTRAVENOUS at 01:44

## 2018-08-28 RX ADMIN — CEFTRIAXONE SODIUM SCH MLS/HR: 1 INJECTION, POWDER, FOR SOLUTION INTRAMUSCULAR; INTRAVENOUS at 10:02

## 2018-08-28 RX ADMIN — LORAZEPAM SCH MG: 2 INJECTION INTRAMUSCULAR; INTRAVENOUS at 10:12

## 2018-08-28 RX ADMIN — HEPARIN SODIUM SCH UNIT: 5000 INJECTION, SOLUTION INTRAVENOUS; SUBCUTANEOUS at 10:13

## 2018-08-28 RX ADMIN — DEXTROSE AND SODIUM CHLORIDE SCH MLS/HR: 5; .45 INJECTION, SOLUTION INTRAVENOUS at 00:04

## 2018-08-28 RX ADMIN — DEXTROSE AND SODIUM CHLORIDE SCH MLS/HR: 5; .45 INJECTION, SOLUTION INTRAVENOUS at 10:04

## 2018-08-28 NOTE — PROGRESS NOTE
Assessment and Plan


Assessment and plan: 





Catatonia


- mental health following. 1013 rescinded.  Pt responsive to painful stimuli 

but still nonverbal and not following commands.  Continue Ativan 1 mg IM Q8hrs 

for catatonia. 





Metabolic encephalopathy


- Supportive care.  EEG reveals diffuse moderate encephalopathy.  MRI negative.





UTI


-Urine culture reveals Citrobacter that is essentially pansensitive.  Blood 

cultures are negative





Dehydration/prot charles malnutrition secondary to #1


- Given current condition, pt is not taking anything by mouth.  Cont. Dobhoff 

TF.





Suspected femoral fracture but Evaluated by orthopedics, reviewed his imaging 

and showed no fracture





DVT prophylaxis








History


Interval history: 





No new issues overnight.





Hospitalist Physical





- Constitutional


Vitals: 


 











Temp Pulse Resp BP Pulse Ox


 


 98.0 F   84   18   123/78   95 


 


 08/28/18 05:32  08/28/18 05:32  08/28/18 10:00  08/28/18 05:32  08/28/18 05:32











General appearance: Present: no acute distress, other (catatonic)





- EENT


Eyes: Present: PERRL, EOM intact


ENT: hearing intact, clear oral mucosa, dentition normal





- Neck


Neck: Present: supple, normal ROM





- Respiratory


Respiratory effort: normal


Respiratory: bilateral: CTA





- Cardiovascular


Rhythm: regular


Heart Sounds: Present: S1 & S2.  Absent: gallop, rub





- Extremities


Extremities: no ischemia, No edema, Full ROM





- Abdominal


General gastrointestinal: soft, non-tender, non-distended, normal bowel sounds





- Integumentary


Integumentary: Present: clear, warm, dry





- Neurologic


Neurologic: CNII-XII intact, moves all extremities





Results





- Labs


CBC & Chem 7: 


 08/25/18 05:43





 08/25/18 05:43


Labs: 


 Laboratory Last Values











WBC  5.9 K/mm3 (4.5-11.0)   08/25/18  05:43    


 


RBC  4.40 M/mm3 (3.65-5.03)   08/25/18  05:43    


 


Hgb  12.4 gm/dl (11.8-15.2)   08/25/18  05:43    


 


Hct  35.1 % (35.5-45.6)  L  08/25/18  05:43    


 


MCV  80 fl (84-94)  L  08/25/18  05:43    


 


MCH  28 pg (28-32)   08/25/18  05:43    


 


MCHC  35 % (32-34)  H  08/25/18  05:43    


 


RDW  14.5 % (13.2-15.2)   08/25/18  05:43    


 


Plt Count  197 K/mm3 (140-440)   08/25/18  05:43    


 


Lymph % (Auto)  15.1 % (13.4-35.0)   08/25/18  05:43    


 


Mono % (Auto)  8.1 % (0.0-7.3)  H  08/25/18  05:43    


 


Eos % (Auto)  2.2 % (0.0-4.3)   08/25/18  05:43    


 


Baso % (Auto)  0.4 % (0.0-1.8)   08/25/18  05:43    


 


Lymph #  0.9 K/mm3 (1.2-5.4)  L  08/25/18  05:43    


 


Mono #  0.5 K/mm3 (0.0-0.8)   08/25/18  05:43    


 


Eos #  0.1 K/mm3 (0.0-0.4)   08/25/18  05:43    


 


Baso #  0.0 K/mm3 (0.0-0.1)   08/25/18  05:43    


 


Total Counted  Cancelled   08/19/18  17:27    


 


Seg Neutrophils %  74.2 % (40.0-70.0)  H  08/25/18  05:43    


 


Seg Neuts % (Manual)  Cancelled   08/19/18  17:27    


 


Band Neutrophils %  Cancelled   08/19/18  17:27    


 


Lymphocytes % (Manual)  Cancelled   08/19/18  17:27    


 


Reactive Lymphs % (Man)  Cancelled   08/19/18  17:27    


 


Monocytes % (Manual)  Cancelled   08/19/18  17:27    


 


Eosinophils % (Manual)  Cancelled   08/19/18  17:27    


 


Basophils % (Manual)  Cancelled   08/19/18  17:27    


 


Metamyelocytes %  Cancelled   08/19/18  17:27    


 


Myelocytes %  Cancelled   08/19/18  17:27    


 


Promyelocytes %  Cancelled   08/19/18  17:27    


 


Blast Cells %  Cancelled   08/19/18  17:27    


 


Nucleated RBC %  Cancelled   08/19/18  17:27    


 


Seg Neutrophils #  4.4 K/mm3 (1.8-7.7)   08/25/18  05:43    


 


Seg Neutrophils # Man  Cancelled   08/19/18  17:27    


 


Band Neutrophils #  Cancelled   08/19/18  17:27    


 


Lymphocytes # (Manual)  Cancelled   08/19/18  17:27    


 


Abs React Lymphs (Man)  Cancelled   08/19/18  17:27    


 


Monocytes # (Manual)  Cancelled   08/19/18  17:27    


 


Eosinophils # (Manual)  Cancelled   08/19/18  17:27    


 


Basophils # (Manual)  Cancelled   08/19/18  17:27    


 


Metamyelocytes #  Cancelled   08/19/18  17:27    


 


Myelocytes #  Cancelled   08/19/18  17:27    


 


Promyelocytes #  Cancelled   08/19/18  17:27    


 


Blast Cells #  Cancelled   08/19/18  17:27    


 


WBC Morphology  Cancelled   08/19/18  17:27    


 


Hypersegmented Neuts  Cancelled   08/19/18  17:27    


 


Hyposegmented Neuts  Cancelled   08/19/18  17:27    


 


Hypogranular Neuts  Cancelled   08/19/18  17:27    


 


Hypersegmented Polys  Cancelled   08/19/18  17:27    


 


Smudge Cells  Cancelled   08/19/18  17:27    


 


Toxic Granulation  Cancelled   08/19/18  17:27    


 


Toxic Vacuolation  Cancelled   08/19/18  17:27    


 


Dohle Bodies  Cancelled   08/19/18  17:27    


 


Pelger-Huet Anomaly  Cancelled   08/19/18  17:27    


 


Phillip Rods  Cancelled   08/19/18  17:27    


 


Platelet Estimate  Cancelled   08/19/18  17:27    


 


Clumped Platelets  Cancelled   08/19/18  17:27    


 


Plt Clumps, EDTA  Cancelled   08/19/18  17:27    


 


Large Platelets  Cancelled   08/19/18  17:27    


 


Giant Platelets  Cancelled   08/19/18  17:27    


 


Platelet Satelliting  Cancelled   08/19/18  17:27    


 


Plt Morphology Comment  Cancelled   08/19/18  17:27    


 


RBC Morphology  Cancelled   08/19/18  17:27    


 


Dimorphic RBCs  Cancelled   08/19/18  17:27    


 


Polychromasia  Cancelled   08/19/18  17:27    


 


Hypochromasia  Cancelled   08/19/18  17:27    


 


Poikilocytosis  Cancelled   08/19/18  17:27    


 


Basophilic Stippling  Cancelled   08/19/18  17:27    


 


Anisocytosis  Cancelled   08/19/18  17:27    


 


Microcytosis  Cancelled   08/19/18  17:27    


 


Macrocytosis  Cancelled   08/19/18  17:27    


 


Spherocytes  Cancelled   08/19/18  17:27    


 


Pappenheimer Bodies  Cancelled   08/19/18  17:27    


 


Sickle Cells  Cancelled   08/19/18  17:27    


 


Target Cells  Cancelled   08/19/18  17:27    


 


Tear Drop Cells  Cancelled   08/19/18  17:27    


 


Ovalocytes  Cancelled   08/19/18  17:27    


 


Stomatocytes  Cancelled   08/19/18  17:27    


 


Helmet Cells  Cancelled   08/19/18  17:27    


 


Burns-Jolly Bodies  Cancelled   08/19/18  17:27    


 


Cabot Rings  Cancelled   08/19/18  17:27    


 


Tamy Cells  Cancelled   08/19/18  17:27    


 


Bite Cells  Cancelled   08/19/18  17:27    


 


Crenated Cell  Cancelled   08/19/18  17:27    


 


Elliptocytes  Cancelled   08/19/18  17:27    


 


Acanthocytes (Spur)  Cancelled   08/19/18  17:27    


 


Rouleaux  Cancelled   08/19/18  17:27    


 


Hemoglobin C Crystals  Cancelled   08/19/18  17:27    


 


Schistocytes  Cancelled   08/19/18  17:27    


 


Malaria parasites  Cancelled   08/19/18  17:27    


 


Abhishek Bodies  Cancelled   08/19/18  17:27    


 


Hem Pathologist Commnt  Cancelled   08/19/18  17:27    


 


Sodium  135 mmol/L (137-145)  L  08/25/18  05:43    


 


Potassium  3.7 mmol/L (3.6-5.0)   08/25/18  05:43    


 


Chloride  99.9 mmol/L ()   08/25/18  05:43    


 


Carbon Dioxide  24 mmol/L (22-30)   08/25/18  05:43    


 


Anion Gap  15 mmol/L  08/25/18  05:43    


 


BUN  2 mg/dL (9-20)  L  08/25/18  05:43    


 


Creatinine  0.6 mg/dL (0.8-1.5)  L  08/25/18  05:43    


 


Estimated GFR  > 60 ml/min  08/25/18  05:43    


 


BUN/Creatinine Ratio  3 %  08/25/18  05:43    


 


Glucose  105 mg/dL ()  H  08/25/18  05:43    


 


POC Glucose  140  ()  H  08/19/18  18:07    


 


Calcium  9.0 mg/dL (8.4-10.2)   08/25/18  05:43    


 


Magnesium  2.20 mg/dL (1.7-2.3)   08/19/18  17:24    


 


Total Bilirubin  1.20 mg/dL (0.1-1.2)   08/19/18  17:27    


 


AST  50 units/L (5-40)  H  08/19/18  17:27    


 


ALT  63 units/L (7-56)  H  08/19/18  17:27    


 


Alkaline Phosphatase  67 units/L ()   08/19/18  17:27    


 


Ammonia  42.0 umol/L (25-60)   08/25/18  15:17    


 


Total Creatine Kinase  148 units/L ()   08/19/18  17:44    


 


CK-MB (CK-2)  1.6 ng/mL (0.0-4.0)   08/19/18  17:44    


 


CK-MB (CK-2) Rel Index  1.0  (0-4)   08/19/18  17:44    


 


Troponin T  < 0.010 ng/mL (0.00-0.029)   08/19/18  17:43    


 


Total Protein  7.2 g/dL (6.3-8.2)   08/19/18  17:27    


 


Albumin  3.6 g/dL (3.9-5)  L  08/19/18  17:27    


 


Albumin/Globulin Ratio  1.0 %  08/19/18  17:27    


 


Vitamin B12  870.0 pg/mL (211-911)   08/25/18  15:13    


 


Folate  6.55 ng/mL (7.3-26.0)  L  08/25/18  15:15    


 


TSH  2.910 mlU/mL (0.270-4.200)   08/25/18  15:16    


 


Free T4  0.92 ng/dL (0.76-1.46)   08/19/18  17:27    


 


Urine Color  Shamika  (Yellow)   08/19/18  17:59    


 


Urine Turbidity  Cloudy  (Clear)   08/19/18  17:59    


 


Urine pH  7.0  (5.0-7.0)   08/19/18  17:59    


 


Ur Specific Gravity  1.021  (1.003-1.030)   08/19/18  17:59    


 


Urine Protein  30 mg/dl mg/dL (Negative)   08/19/18  17:59    


 


Urine Glucose (UA)  Neg mg/dL (Negative)   08/19/18  17:59    


 


Urine Ketones  Neg mg/dL (Negative)   08/19/18  17:59    


 


Urine Blood  Neg  (Negative)   08/19/18  17:59    


 


Urine Nitrite  Neg  (Negative)   08/19/18  17:59    


 


Urine Bilirubin  Neg  (Negative)   08/19/18  17:59    


 


Urine Urobilinogen  4.0 mg/dL (<2.0)   08/19/18  17:59    


 


Ur Leukocyte Esterase  Lg  (Negative)   08/19/18  17:59    


 


Urine WBC (Auto)  92.0 /HPF (0.0-6.0)  H  08/19/18  17:59    


 


Urine RBC (Auto)  6.0 /HPF (0.0-6.0)   08/19/18  17:59    


 


U Epithel Cells (Auto)  < 1.0 /HPF (0-13.0)   08/19/18  17:59    


 


Urine Bacteria (Auto)  3+ /HPF (Negative)   08/19/18  17:59    


 


Ur Transition Epith Cell  2 /HPF  08/19/18  17:59    


 


Urine Mucus  1+ /HPF  08/19/18  17:59    


 


Salicylates  < 0.3 mg/dL (2.8-20.0)  L  08/19/18  17:27    


 


Urine Opiates Screen  Presumptive negative   08/19/18  17:59    


 


Urine Methadone Screen  Presumptive negative   08/19/18  17:59    


 


Acetaminophen  < 5.0 ug/mL (10.0-30.0)  L  08/19/18  17:27    


 


Ur Barbiturates Screen  Presumptive negative   08/19/18  17:59    


 


Ur Phencyclidine Scrn  Presumptive negative   08/19/18  17:59    


 


Ur Amphetamines Screen  Presumptive negative   08/19/18  17:59    


 


U Benzodiazepines Scrn  Presumptive negative   08/19/18  17:59    


 


Urine Cocaine Screen  Presumptive negative   08/19/18  17:59    


 


U Marijuana (THC) Screen  Presumptive negative   08/19/18  17:59    


 


Drugs of Abuse Note  Disclamer   08/19/18  17:59    


 


Plasma/Serum Alcohol  < 0.01 % (0-0.07)   08/19/18  17:27

## 2018-08-28 NOTE — PROGRESS NOTE
Subjective





- Reason for Consult


Consult date: 08/28/18


Reason for consult: Psychiatry Follow-up





- Chief Complaint


Chief complaint: 


"The patient is still nonverbal"





Patient is a 58-year-old  male who presents to the ER for AMS 

from [place]. The psychiatry team was consulted to see patient for catatonic 

like state. There's no change to the patient's presentation. 





Spoke with the patient's sister Kiara Colin [phone]. It was 

explained to her that the patient is possibly having an adverse reaction to the 

Aristada injection he received 30 July 2018. She was informed that we are (The 

Psychiatry Team) tapering the patient off Ativan because catatonia has been 

ruled out and will sign off once that is completed. Also, she was informed that 

Aristada is a long acting antipsychotic. She verbalized understanding.    





Mental Status Exam





- Vital signs


 Last Vital Signs











Temp  98.0 F   08/28/18 05:32


 


Pulse  84   08/28/18 05:32


 


Resp  18   08/28/18 10:00


 


BP  123/78   08/28/18 05:32


 


Pulse Ox  95   08/28/18 05:32














- Exam


Narrative exam: 


Unable to complete the MSE because of the patient's condition.

















Assessment and Plan


Impression: Catatonia has been ruled out. Today the patient is still nonverbal 

during the assessment. V/S stable. 





Recommendation/Plan: Continue Ativan 1 mg IM daily (taper). Will complete the 

Ativan taper NLT Thursday 30 Aug 2018. Psychiatry will continue to see patient 

until the Ativan taper is completed. Informed Case Mgmt, the patient may need 

placement.

## 2018-08-29 RX ADMIN — DEXTROSE AND SODIUM CHLORIDE SCH MLS/HR: 5; .45 INJECTION, SOLUTION INTRAVENOUS at 01:53

## 2018-08-29 RX ADMIN — HEPARIN SODIUM SCH UNIT: 5000 INJECTION, SOLUTION INTRAVENOUS; SUBCUTANEOUS at 14:41

## 2018-08-29 RX ADMIN — LORAZEPAM SCH MG: 2 INJECTION INTRAMUSCULAR; INTRAVENOUS at 14:40

## 2018-08-29 RX ADMIN — HEPARIN SODIUM SCH UNIT: 5000 INJECTION, SOLUTION INTRAVENOUS; SUBCUTANEOUS at 22:25

## 2018-08-29 NOTE — XRAY REPORT
FINAL REPORT



EXAM:  XR ABDOMEN 1V AP



HISTORY:  verify feeding tube placement 



TECHNIQUE:  KUB(s) view of abdomen.



PRIORS:  26 August 2018.



FINDINGS:  

Enteric tube projected immediately cephalad to EG junction and

should be advanced somewhat. 



Nonspecific bowel gas pattern. No apparent pneumoperitoneum. 



Osseous structures grossly unremarkable. 



IMPRESSION:  

1. Enteric tube position as reported. 



2. Nonspecific bowel gas pattern, which may represent adynamic

ileus. Followup may be warranted.

## 2018-08-29 NOTE — XRAY REPORT
FINAL REPORT



EXAM:  XR ABDOMEN 1V AP



HISTORY:  verify feeding tube placement. 



TECHNIQUE:  KUB(s) view of abdomen.



PRIORS:  Earlier on same date. 



FINDINGS:  

Enteric tube extends below the diaphragm, with tip coiled and

projected over left upper quadrant. 



Nonspecific bowel gas pattern. No apparent pneumoperitoneum. 



Osseous structures grossly unremarkable. 



IMPRESSION:  

1. Enteric tube position as reported. 



2. Nonspecific bowel gas pattern, which may represent adynamic

ileus. Followup may be warranted.

## 2018-08-29 NOTE — PROGRESS NOTE
Assessment and Plan


Assessment and plan: 





58-year-old -American male came from mental health facility for altered 

mental status


Catatonia, Psychiatry stated it is r/o


- mental health consulted, on Ativan 1 mg IM daily


Metabolic encephalopathy


- Supportive care


- EEG showed moderate diffuse encephalopathy


UTI


- Patient is recultured.  Citrobacter which is pansensitive


- Patient was treated with IV antibiotics


Failure to eat


- Continue Dobbhoff tube feeding


Suspected femoral fracture


-Evaluated by orthopedics, reviewed his imaging and showed no fracture


DVT prophylaxis


Disposition


-Patient will be off benzo and need placement








History


Interval history: 





Patient was seen and evaluated this morning, patient is nonverbal, sleepy.





Hospitalist Physical





- Physical exam


Narrative exam: 


 Not in cardiopulmonary distress. 


 The patient appeared well nourished and normally developed.


 Vital signs as documented.


 Head exam is unremarkable.


 No scleral icterus .


 Neck is without jugular venous distension, thyromegaly, or carotid bruits. 


 Lungs are clear to auscultation.


Cardiac exam reveals regular rate and  Rhythm. First and second heart sounds 

normal. No murmurs, rubs or gallops. 


Abdominal exam reveals normal bowel sounds, no masses, no organomegaly and no 

aortic enlargement. 


Extremities are nonedematous and both femoral and pedal pulses are normal.


CNS: Patient didn't talk to me.  Patient is very stiff.





- Constitutional


Vitals: 


 











Temp Pulse Resp BP Pulse Ox


 


 98.0 F   85   16   96/62   96 


 


 08/28/18 23:10  08/28/18 23:10  08/28/18 23:10  08/28/18 23:10  08/28/18 23:10











General appearance: Present: no acute distress, other (catatonic)





Results





- Labs


CBC & Chem 7: 


 08/25/18 05:43





 08/25/18 05:43


Labs: 


 Laboratory Last Values











WBC  5.9 K/mm3 (4.5-11.0)   08/25/18  05:43    


 


RBC  4.40 M/mm3 (3.65-5.03)   08/25/18  05:43    


 


Hgb  12.4 gm/dl (11.8-15.2)   08/25/18  05:43    


 


Hct  35.1 % (35.5-45.6)  L  08/25/18  05:43    


 


MCV  80 fl (84-94)  L  08/25/18  05:43    


 


MCH  28 pg (28-32)   08/25/18  05:43    


 


MCHC  35 % (32-34)  H  08/25/18  05:43    


 


RDW  14.5 % (13.2-15.2)   08/25/18  05:43    


 


Plt Count  197 K/mm3 (140-440)   08/25/18  05:43    


 


Lymph % (Auto)  15.1 % (13.4-35.0)   08/25/18  05:43    


 


Mono % (Auto)  8.1 % (0.0-7.3)  H  08/25/18  05:43    


 


Eos % (Auto)  2.2 % (0.0-4.3)   08/25/18  05:43    


 


Baso % (Auto)  0.4 % (0.0-1.8)   08/25/18  05:43    


 


Lymph #  0.9 K/mm3 (1.2-5.4)  L  08/25/18  05:43    


 


Mono #  0.5 K/mm3 (0.0-0.8)   08/25/18  05:43    


 


Eos #  0.1 K/mm3 (0.0-0.4)   08/25/18  05:43    


 


Baso #  0.0 K/mm3 (0.0-0.1)   08/25/18  05:43    


 


Total Counted  Cancelled   08/19/18  17:27    


 


Seg Neutrophils %  74.2 % (40.0-70.0)  H  08/25/18  05:43    


 


Seg Neuts % (Manual)  Cancelled   08/19/18  17:27    


 


Band Neutrophils %  Cancelled   08/19/18  17:27    


 


Lymphocytes % (Manual)  Cancelled   08/19/18  17:27    


 


Reactive Lymphs % (Man)  Cancelled   08/19/18  17:27    


 


Monocytes % (Manual)  Cancelled   08/19/18  17:27    


 


Eosinophils % (Manual)  Cancelled   08/19/18  17:27    


 


Basophils % (Manual)  Cancelled   08/19/18  17:27    


 


Metamyelocytes %  Cancelled   08/19/18  17:27    


 


Myelocytes %  Cancelled   08/19/18  17:27    


 


Promyelocytes %  Cancelled   08/19/18  17:27    


 


Blast Cells %  Cancelled   08/19/18  17:27    


 


Nucleated RBC %  Cancelled   08/19/18  17:27    


 


Seg Neutrophils #  4.4 K/mm3 (1.8-7.7)   08/25/18  05:43    


 


Seg Neutrophils # Man  Cancelled   08/19/18  17:27    


 


Band Neutrophils #  Cancelled   08/19/18  17:27    


 


Lymphocytes # (Manual)  Cancelled   08/19/18  17:27    


 


Abs React Lymphs (Man)  Cancelled   08/19/18  17:27    


 


Monocytes # (Manual)  Cancelled   08/19/18  17:27    


 


Eosinophils # (Manual)  Cancelled   08/19/18  17:27    


 


Basophils # (Manual)  Cancelled   08/19/18  17:27    


 


Metamyelocytes #  Cancelled   08/19/18  17:27    


 


Myelocytes #  Cancelled   08/19/18  17:27    


 


Promyelocytes #  Cancelled   08/19/18  17:27    


 


Blast Cells #  Cancelled   08/19/18  17:27    


 


WBC Morphology  Cancelled   08/19/18  17:27    


 


Hypersegmented Neuts  Cancelled   08/19/18  17:27    


 


Hyposegmented Neuts  Cancelled   08/19/18  17:27    


 


Hypogranular Neuts  Cancelled   08/19/18  17:27    


 


Hypersegmented Polys  Cancelled   08/19/18  17:27    


 


Smudge Cells  Cancelled   08/19/18  17:27    


 


Toxic Granulation  Cancelled   08/19/18  17:27    


 


Toxic Vacuolation  Cancelled   08/19/18  17:27    


 


Dohle Bodies  Cancelled   08/19/18  17:27    


 


Pelger-Huet Anomaly  Cancelled   08/19/18  17:27    


 


Phillip Rods  Cancelled   08/19/18  17:27    


 


Platelet Estimate  Cancelled   08/19/18  17:27    


 


Clumped Platelets  Cancelled   08/19/18  17:27    


 


Plt Clumps, EDTA  Cancelled   08/19/18  17:27    


 


Large Platelets  Cancelled   08/19/18  17:27    


 


Giant Platelets  Cancelled   08/19/18  17:27    


 


Platelet Satelliting  Cancelled   08/19/18  17:27    


 


Plt Morphology Comment  Cancelled   08/19/18  17:27    


 


RBC Morphology  Cancelled   08/19/18  17:27    


 


Dimorphic RBCs  Cancelled   08/19/18  17:27    


 


Polychromasia  Cancelled   08/19/18  17:27    


 


Hypochromasia  Cancelled   08/19/18  17:27    


 


Poikilocytosis  Cancelled   08/19/18  17:27    


 


Basophilic Stippling  Cancelled   08/19/18  17:27    


 


Anisocytosis  Cancelled   08/19/18  17:27    


 


Microcytosis  Cancelled   08/19/18  17:27    


 


Macrocytosis  Cancelled   08/19/18  17:27    


 


Spherocytes  Cancelled   08/19/18  17:27    


 


Pappenheimer Bodies  Cancelled   08/19/18  17:27    


 


Sickle Cells  Cancelled   08/19/18  17:27    


 


Target Cells  Cancelled   08/19/18  17:27    


 


Tear Drop Cells  Cancelled   08/19/18  17:27    


 


Ovalocytes  Cancelled   08/19/18  17:27    


 


Stomatocytes  Cancelled   08/19/18  17:27    


 


Helmet Cells  Cancelled   08/19/18  17:27    


 


Burns-Jolly Bodies  Cancelled   08/19/18  17:27    


 


Cabot Rings  Cancelled   08/19/18  17:27    


 


Tamy Cells  Cancelled   08/19/18  17:27    


 


Bite Cells  Cancelled   08/19/18  17:27    


 


Crenated Cell  Cancelled   08/19/18  17:27    


 


Elliptocytes  Cancelled   08/19/18  17:27    


 


Acanthocytes (Spur)  Cancelled   08/19/18  17:27    


 


Rouleaux  Cancelled   08/19/18  17:27    


 


Hemoglobin C Crystals  Cancelled   08/19/18  17:27    


 


Schistocytes  Cancelled   08/19/18  17:27    


 


Malaria parasites  Cancelled   08/19/18  17:27    


 


Abhishek Bodies  Cancelled   08/19/18  17:27    


 


Hem Pathologist Commnt  Cancelled   08/19/18  17:27    


 


Sodium  135 mmol/L (137-145)  L  08/25/18  05:43    


 


Potassium  3.7 mmol/L (3.6-5.0)   08/25/18  05:43    


 


Chloride  99.9 mmol/L ()   08/25/18  05:43    


 


Carbon Dioxide  24 mmol/L (22-30)   08/25/18  05:43    


 


Anion Gap  15 mmol/L  08/25/18  05:43    


 


BUN  2 mg/dL (9-20)  L  08/25/18  05:43    


 


Creatinine  0.6 mg/dL (0.8-1.5)  L  08/25/18  05:43    


 


Estimated GFR  > 60 ml/min  08/25/18  05:43    


 


BUN/Creatinine Ratio  3 %  08/25/18  05:43    


 


Glucose  105 mg/dL ()  H  08/25/18  05:43    


 


POC Glucose  140  ()  H  08/19/18  18:07    


 


Calcium  9.0 mg/dL (8.4-10.2)   08/25/18  05:43    


 


Magnesium  2.20 mg/dL (1.7-2.3)   08/19/18  17:24    


 


Total Bilirubin  1.20 mg/dL (0.1-1.2)   08/19/18  17:27    


 


AST  50 units/L (5-40)  H  08/19/18  17:27    


 


ALT  63 units/L (7-56)  H  08/19/18  17:27    


 


Alkaline Phosphatase  67 units/L ()   08/19/18  17:27    


 


Ammonia  42.0 umol/L (25-60)   08/25/18  15:17    


 


Total Creatine Kinase  148 units/L ()   08/19/18  17:44    


 


CK-MB (CK-2)  1.6 ng/mL (0.0-4.0)   08/19/18  17:44    


 


CK-MB (CK-2) Rel Index  1.0  (0-4)   08/19/18  17:44    


 


Troponin T  < 0.010 ng/mL (0.00-0.029)   08/19/18  17:43    


 


Total Protein  7.2 g/dL (6.3-8.2)   08/19/18  17:27    


 


Albumin  3.6 g/dL (3.9-5)  L  08/19/18  17:27    


 


Albumin/Globulin Ratio  1.0 %  08/19/18  17:27    


 


Vitamin B12  870.0 pg/mL (211-911)   08/25/18  15:13    


 


Folate  6.55 ng/mL (7.3-26.0)  L  08/25/18  15:15    


 


TSH  2.910 mlU/mL (0.270-4.200)   08/25/18  15:16    


 


Free T4  0.92 ng/dL (0.76-1.46)   08/19/18  17:27    


 


Urine Color  Shamika  (Yellow)   08/19/18  17:59    


 


Urine Turbidity  Cloudy  (Clear)   08/19/18  17:59    


 


Urine pH  7.0  (5.0-7.0)   08/19/18  17:59    


 


Ur Specific Gravity  1.021  (1.003-1.030)   08/19/18  17:59    


 


Urine Protein  30 mg/dl mg/dL (Negative)   08/19/18  17:59    


 


Urine Glucose (UA)  Neg mg/dL (Negative)   08/19/18  17:59    


 


Urine Ketones  Neg mg/dL (Negative)   08/19/18  17:59    


 


Urine Blood  Neg  (Negative)   08/19/18  17:59    


 


Urine Nitrite  Neg  (Negative)   08/19/18  17:59    


 


Urine Bilirubin  Neg  (Negative)   08/19/18  17:59    


 


Urine Urobilinogen  4.0 mg/dL (<2.0)   08/19/18  17:59    


 


Ur Leukocyte Esterase  Lg  (Negative)   08/19/18  17:59    


 


Urine WBC (Auto)  92.0 /HPF (0.0-6.0)  H  08/19/18  17:59    


 


Urine RBC (Auto)  6.0 /HPF (0.0-6.0)   08/19/18  17:59    


 


U Epithel Cells (Auto)  < 1.0 /HPF (0-13.0)   08/19/18  17:59    


 


Urine Bacteria (Auto)  3+ /HPF (Negative)   08/19/18  17:59    


 


Ur Transition Epith Cell  2 /HPF  08/19/18  17:59    


 


Urine Mucus  1+ /HPF  08/19/18  17:59    


 


Salicylates  < 0.3 mg/dL (2.8-20.0)  L  08/19/18  17:27    


 


Urine Opiates Screen  Presumptive negative   08/19/18  17:59    


 


Urine Methadone Screen  Presumptive negative   08/19/18  17:59    


 


Acetaminophen  < 5.0 ug/mL (10.0-30.0)  L  08/19/18  17:27    


 


Ur Barbiturates Screen  Presumptive negative   08/19/18  17:59    


 


Ur Phencyclidine Scrn  Presumptive negative   08/19/18  17:59    


 


Ur Amphetamines Screen  Presumptive negative   08/19/18  17:59    


 


U Benzodiazepines Scrn  Presumptive negative   08/19/18  17:59    


 


Urine Cocaine Screen  Presumptive negative   08/19/18  17:59    


 


U Marijuana (THC) Screen  Presumptive negative   08/19/18  17:59    


 


Drugs of Abuse Note  Disclamer   08/19/18  17:59    


 


Plasma/Serum Alcohol  < 0.01 % (0-0.07)   08/19/18  17:27

## 2018-08-29 NOTE — XRAY REPORT
AP ABDOMEN:



HISTORY: Dobbhoff tube placement..



The distal tip of the feeding tube terminates in the fundus of the 

stomach. There is moderate stool throughout the colon and rectum. The 

abdominal gas pattern is unremarkable.  No masses or

organomegaly is identified and there is no gross evidence of free air 

or fluid.  No significant soft tissue calcifications are noted.



IMPRESSION:

Fecal retention. The feeding tube terminates in the fundus of the 

stomach.

## 2018-08-29 NOTE — PROGRESS NOTE
Subjective





- Reason for Consult


Consult date: 08/29/18


Reason for consult: Psychiatric Follow-up Evaluation





- Chief Complaint


Chief complaint: 


The patient is still nonverbal





Patient is a 58-year-old  male who presents to the ER for AMS 

from [facility]. The psychiatry team was consulted to see patient for catatonic 

like state. There's no change to the patient's presentation. Patient is 

nonverbal. He appears more alert/responsive today compared to previous days. 

Per staff patient has been without any behavioral disturbances. No agitation/

irritation noted. 


    





Mental Status Exam





- Vital signs


 Last Vital Signs











Temp  98.9 F   08/29/18 12:01


 


Pulse  85   08/29/18 12:01


 


Resp  20   08/29/18 12:01


 


BP  123/78   08/29/18 12:01


 


Pulse Ox  100   08/29/18 12:01














- Exam


Narrative exam: 


Unable to complete MSE due to patient's medical condition. 








Assessment and Plan


Impression: Catatonia has been ruled out. Today the patient is still nonverbal 

during the assessment. V/S stable. 





Recommendation/Plan: 


1. Decrease Ativan 0.5 mg IM daily (taper). Will complete the Ativan taper NLT 

Thursday 30 Aug 2018.


2. Psychiatry will continue to see patient until the Ativan taper is completed. 

Informed Case Mgmt, the patient may need placement.

## 2018-08-29 NOTE — XRAY REPORT
AP ABDOMEN:



HISTORY: Verify feeding tube placement.



The feeding tube is coiled in the cardia of the stomach with its tip 

beneath the left hemidiaphragm. The abdominal gas pattern is 

unremarkable.  No masses or

organomegaly is identified and there is no gross evidence of free air 

or fluid.  No significant soft tissue calcifications are noted.



IMPRESSION:

Unremarkable abdomen. Feeding tube as described.

## 2018-08-30 RX ADMIN — HEPARIN SODIUM SCH UNIT: 5000 INJECTION, SOLUTION INTRAVENOUS; SUBCUTANEOUS at 10:45

## 2018-08-30 RX ADMIN — DEXTROSE AND SODIUM CHLORIDE SCH MLS/HR: 5; .45 INJECTION, SOLUTION INTRAVENOUS at 00:31

## 2018-08-30 NOTE — XRAY REPORT
AP ABDOMEN:



HISTORY: Feeding tube placement.



The feeding tube is essentially unchanged in position since yesterday's 

exam at 1735 hrs terminating in the cardia of the stomach. There is 

moderate stool throughout the colon and rectum. The abdominal gas 

pattern is unremarkable.  No masses or

organomegaly is identified and there is no gross evidence of free air 

or fluid.  No significant soft tissue calcifications are noted.



IMPRESSION:

Fecal retention. Feeding tube as described.

## 2018-08-30 NOTE — PROGRESS NOTE
Subjective





- Reason for Consult


Consult date: 08/30/18


Reason for consult: Psychiatry Follow-up





- Chief Complaint


Chief complaint: 


"The patient is still nonverbal"





Patient is a 58-year-old  male who presents to the ER for AMS 

from [place]. The psychiatry team was consulted to see patient for catatonic 

like state. Today the patient is still nonverbal, but responds to his name when 

called. Also, he opened his eyes. This is a different presentation than 

previous days. No agitation/irritation noted. 


    





Mental Status Exam





- Vital signs


 Last Vital Signs











Temp  98.4 F   08/30/18 05:41


 


Pulse  98 H  08/30/18 05:41


 


Resp  18   08/30/18 05:41


 


BP  132/89   08/30/18 05:41


 


Pulse Ox  95   08/30/18 05:41














- Exam


Narrative exam: 


Unable to complete the MSE because of the patient's condition.


























Assessment and Plan


Impression: Catatonia has been ruled out. Today the patient is still nonverbal, 

but responds to his name when called. V/S stable. 





Recommendation/Plan: Ativan taper completed. Psychiatry sign off. Case Mgmt 

involvement, the patient may need placement.

## 2018-08-30 NOTE — PROGRESS NOTE
Assessment and Plan


Assessment and plan: 





58-year-old -American male came from mental health facility for altered 

mental status


Catatonia, Psychiatry stated it is r/o


- mental health consulted, on Ativan 0.5 mg IM daily


- Will finish ativan today


Metabolic encephalopathy


- Supportive care


- EEG showed moderate diffuse encephalopathy


UTI


- Patient is recultured.  Citrobacter which is pansensitive


- Patient was treated with IV antibiotics


Failure to eat


- Continue Dobbhoff tube feeding


Suspected femoral fracture


-Evaluated by orthopedics, reviewed his imaging and showed no fracture


DVT prophylaxis


Disposition


-Patient will be off benzo and need placement








History


Interval history: 





Patient was seen and evaluated this morning, patient is nonverbal, sleepy. Per 

nursing staff patient was agitated yesterday and pull out the IV line and 

required to put the restraints.





Hospitalist Physical





- Physical exam


Narrative exam: 


 Not in cardiopulmonary distress. 


 The patient appeared well nourished and normally developed.


 Vital signs as documented.


 Head exam is unremarkable.


 No scleral icterus .


 Neck is without jugular venous distension, thyromegaly, or carotid bruits. 


 Lungs are clear to auscultation.


Cardiac exam reveals regular rate and  Rhythm. First and second heart sounds 

normal. No murmurs, rubs or gallops. 


Abdominal exam reveals normal bowel sounds, no masses, no organomegaly and no 

aortic enlargement. 


Extremities are nonedematous and both femoral and pedal pulses are normal.


CNS: Patient didn't talk to me.  Patient is very stiff.





- Constitutional


Vitals: 


 











Temp Pulse Resp BP Pulse Ox


 


 98.4 F   98 H  18   132/89   95 


 


 08/30/18 05:41  08/30/18 05:41  08/30/18 05:41  08/30/18 05:41  08/30/18 05:41











General appearance: Present: no acute distress, other (catatonic)





Results





- Labs


CBC & Chem 7: 


 08/25/18 05:43





 08/25/18 05:43


Labs: 


 Laboratory Last Values











WBC  5.9 K/mm3 (4.5-11.0)   08/25/18  05:43    


 


RBC  4.40 M/mm3 (3.65-5.03)   08/25/18  05:43    


 


Hgb  12.4 gm/dl (11.8-15.2)   08/25/18  05:43    


 


Hct  35.1 % (35.5-45.6)  L  08/25/18  05:43    


 


MCV  80 fl (84-94)  L  08/25/18  05:43    


 


MCH  28 pg (28-32)   08/25/18  05:43    


 


MCHC  35 % (32-34)  H  08/25/18  05:43    


 


RDW  14.5 % (13.2-15.2)   08/25/18  05:43    


 


Plt Count  197 K/mm3 (140-440)   08/25/18  05:43    


 


Lymph % (Auto)  15.1 % (13.4-35.0)   08/25/18  05:43    


 


Mono % (Auto)  8.1 % (0.0-7.3)  H  08/25/18  05:43    


 


Eos % (Auto)  2.2 % (0.0-4.3)   08/25/18  05:43    


 


Baso % (Auto)  0.4 % (0.0-1.8)   08/25/18  05:43    


 


Lymph #  0.9 K/mm3 (1.2-5.4)  L  08/25/18  05:43    


 


Mono #  0.5 K/mm3 (0.0-0.8)   08/25/18  05:43    


 


Eos #  0.1 K/mm3 (0.0-0.4)   08/25/18  05:43    


 


Baso #  0.0 K/mm3 (0.0-0.1)   08/25/18  05:43    


 


Total Counted  Cancelled   08/19/18  17:27    


 


Seg Neutrophils %  74.2 % (40.0-70.0)  H  08/25/18  05:43    


 


Seg Neuts % (Manual)  Cancelled   08/19/18  17:27    


 


Band Neutrophils %  Cancelled   08/19/18  17:27    


 


Lymphocytes % (Manual)  Cancelled   08/19/18  17:27    


 


Reactive Lymphs % (Man)  Cancelled   08/19/18  17:27    


 


Monocytes % (Manual)  Cancelled   08/19/18  17:27    


 


Eosinophils % (Manual)  Cancelled   08/19/18  17:27    


 


Basophils % (Manual)  Cancelled   08/19/18  17:27    


 


Metamyelocytes %  Cancelled   08/19/18  17:27    


 


Myelocytes %  Cancelled   08/19/18  17:27    


 


Promyelocytes %  Cancelled   08/19/18  17:27    


 


Blast Cells %  Cancelled   08/19/18  17:27    


 


Nucleated RBC %  Cancelled   08/19/18  17:27    


 


Seg Neutrophils #  4.4 K/mm3 (1.8-7.7)   08/25/18  05:43    


 


Seg Neutrophils # Man  Cancelled   08/19/18  17:27    


 


Band Neutrophils #  Cancelled   08/19/18  17:27    


 


Lymphocytes # (Manual)  Cancelled   08/19/18  17:27    


 


Abs React Lymphs (Man)  Cancelled   08/19/18  17:27    


 


Monocytes # (Manual)  Cancelled   08/19/18  17:27    


 


Eosinophils # (Manual)  Cancelled   08/19/18  17:27    


 


Basophils # (Manual)  Cancelled   08/19/18  17:27    


 


Metamyelocytes #  Cancelled   08/19/18  17:27    


 


Myelocytes #  Cancelled   08/19/18  17:27    


 


Promyelocytes #  Cancelled   08/19/18  17:27    


 


Blast Cells #  Cancelled   08/19/18  17:27    


 


WBC Morphology  Cancelled   08/19/18  17:27    


 


Hypersegmented Neuts  Cancelled   08/19/18  17:27    


 


Hyposegmented Neuts  Cancelled   08/19/18  17:27    


 


Hypogranular Neuts  Cancelled   08/19/18  17:27    


 


Hypersegmented Polys  Cancelled   08/19/18  17:27    


 


Smudge Cells  Cancelled   08/19/18  17:27    


 


Toxic Granulation  Cancelled   08/19/18  17:27    


 


Toxic Vacuolation  Cancelled   08/19/18  17:27    


 


Dohle Bodies  Cancelled   08/19/18  17:27    


 


Pelger-Huet Anomaly  Cancelled   08/19/18  17:27    


 


Phillip Rods  Cancelled   08/19/18  17:27    


 


Platelet Estimate  Cancelled   08/19/18  17:27    


 


Clumped Platelets  Cancelled   08/19/18  17:27    


 


Plt Clumps, EDTA  Cancelled   08/19/18  17:27    


 


Large Platelets  Cancelled   08/19/18  17:27    


 


Giant Platelets  Cancelled   08/19/18  17:27    


 


Platelet Satelliting  Cancelled   08/19/18  17:27    


 


Plt Morphology Comment  Cancelled   08/19/18  17:27    


 


RBC Morphology  Cancelled   08/19/18  17:27    


 


Dimorphic RBCs  Cancelled   08/19/18  17:27    


 


Polychromasia  Cancelled   08/19/18  17:27    


 


Hypochromasia  Cancelled   08/19/18  17:27    


 


Poikilocytosis  Cancelled   08/19/18  17:27    


 


Basophilic Stippling  Cancelled   08/19/18  17:27    


 


Anisocytosis  Cancelled   08/19/18  17:27    


 


Microcytosis  Cancelled   08/19/18  17:27    


 


Macrocytosis  Cancelled   08/19/18  17:27    


 


Spherocytes  Cancelled   08/19/18  17:27    


 


Pappenheimer Bodies  Cancelled   08/19/18  17:27    


 


Sickle Cells  Cancelled   08/19/18  17:27    


 


Target Cells  Cancelled   08/19/18  17:27    


 


Tear Drop Cells  Cancelled   08/19/18  17:27    


 


Ovalocytes  Cancelled   08/19/18  17:27    


 


Stomatocytes  Cancelled   08/19/18  17:27    


 


Helmet Cells  Cancelled   08/19/18  17:27    


 


Burns-Jolly Bodies  Cancelled   08/19/18  17:27    


 


Cabot Rings  Cancelled   08/19/18  17:27    


 


Tamy Cells  Cancelled   08/19/18  17:27    


 


Bite Cells  Cancelled   08/19/18  17:27    


 


Crenated Cell  Cancelled   08/19/18  17:27    


 


Elliptocytes  Cancelled   08/19/18  17:27    


 


Acanthocytes (Spur)  Cancelled   08/19/18  17:27    


 


Rouleaux  Cancelled   08/19/18  17:27    


 


Hemoglobin C Crystals  Cancelled   08/19/18  17:27    


 


Schistocytes  Cancelled   08/19/18  17:27    


 


Malaria parasites  Cancelled   08/19/18  17:27    


 


Abhishek Bodies  Cancelled   08/19/18  17:27    


 


Hem Pathologist Commnt  Cancelled   08/19/18  17:27    


 


Sodium  135 mmol/L (137-145)  L  08/25/18  05:43    


 


Potassium  3.7 mmol/L (3.6-5.0)   08/25/18  05:43    


 


Chloride  99.9 mmol/L ()   08/25/18  05:43    


 


Carbon Dioxide  24 mmol/L (22-30)   08/25/18  05:43    


 


Anion Gap  15 mmol/L  08/25/18  05:43    


 


BUN  2 mg/dL (9-20)  L  08/25/18  05:43    


 


Creatinine  0.6 mg/dL (0.8-1.5)  L  08/25/18  05:43    


 


Estimated GFR  > 60 ml/min  08/25/18  05:43    


 


BUN/Creatinine Ratio  3 %  08/25/18  05:43    


 


Glucose  105 mg/dL ()  H  08/25/18  05:43    


 


POC Glucose  106  ()  H  08/29/18  11:06    


 


Calcium  9.0 mg/dL (8.4-10.2)   08/25/18  05:43    


 


Magnesium  2.20 mg/dL (1.7-2.3)   08/19/18  17:24    


 


Total Bilirubin  1.20 mg/dL (0.1-1.2)   08/19/18  17:27    


 


AST  50 units/L (5-40)  H  08/19/18  17:27    


 


ALT  63 units/L (7-56)  H  08/19/18  17:27    


 


Alkaline Phosphatase  67 units/L ()   08/19/18  17:27    


 


Ammonia  42.0 umol/L (25-60)   08/25/18  15:17    


 


Total Creatine Kinase  148 units/L ()   08/19/18  17:44    


 


CK-MB (CK-2)  1.6 ng/mL (0.0-4.0)   08/19/18  17:44    


 


CK-MB (CK-2) Rel Index  1.0  (0-4)   08/19/18  17:44    


 


Troponin T  < 0.010 ng/mL (0.00-0.029)   08/19/18  17:43    


 


Total Protein  7.2 g/dL (6.3-8.2)   08/19/18  17:27    


 


Albumin  3.6 g/dL (3.9-5)  L  08/19/18  17:27    


 


Albumin/Globulin Ratio  1.0 %  08/19/18  17:27    


 


Vitamin B12  870.0 pg/mL (211-911)   08/25/18  15:13    


 


Folate  6.55 ng/mL (7.3-26.0)  L  08/25/18  15:15    


 


TSH  2.910 mlU/mL (0.270-4.200)   08/25/18  15:16    


 


Free T4  0.92 ng/dL (0.76-1.46)   08/19/18  17:27    


 


Urine Color  Shamika  (Yellow)   08/19/18  17:59    


 


Urine Turbidity  Cloudy  (Clear)   08/19/18  17:59    


 


Urine pH  7.0  (5.0-7.0)   08/19/18  17:59    


 


Ur Specific Gravity  1.021  (1.003-1.030)   08/19/18  17:59    


 


Urine Protein  30 mg/dl mg/dL (Negative)   08/19/18  17:59    


 


Urine Glucose (UA)  Neg mg/dL (Negative)   08/19/18  17:59    


 


Urine Ketones  Neg mg/dL (Negative)   08/19/18  17:59    


 


Urine Blood  Neg  (Negative)   08/19/18  17:59    


 


Urine Nitrite  Neg  (Negative)   08/19/18  17:59    


 


Urine Bilirubin  Neg  (Negative)   08/19/18  17:59    


 


Urine Urobilinogen  4.0 mg/dL (<2.0)   08/19/18  17:59    


 


Ur Leukocyte Esterase  Lg  (Negative)   08/19/18  17:59    


 


Urine WBC (Auto)  92.0 /HPF (0.0-6.0)  H  08/19/18  17:59    


 


Urine RBC (Auto)  6.0 /HPF (0.0-6.0)   08/19/18  17:59    


 


U Epithel Cells (Auto)  < 1.0 /HPF (0-13.0)   08/19/18  17:59    


 


Urine Bacteria (Auto)  3+ /HPF (Negative)   08/19/18  17:59    


 


Ur Transition Epith Cell  2 /HPF  08/19/18  17:59    


 


Urine Mucus  1+ /HPF  08/19/18  17:59    


 


Salicylates  < 0.3 mg/dL (2.8-20.0)  L  08/19/18  17:27    


 


Urine Opiates Screen  Presumptive negative   08/19/18  17:59    


 


Urine Methadone Screen  Presumptive negative   08/19/18  17:59    


 


Acetaminophen  < 5.0 ug/mL (10.0-30.0)  L  08/19/18  17:27    


 


Ur Barbiturates Screen  Presumptive negative   08/19/18  17:59    


 


Ur Phencyclidine Scrn  Presumptive negative   08/19/18  17:59    


 


Ur Amphetamines Screen  Presumptive negative   08/19/18  17:59    


 


U Benzodiazepines Scrn  Presumptive negative   08/19/18  17:59    


 


Urine Cocaine Screen  Presumptive negative   08/19/18  17:59    


 


U Marijuana (THC) Screen  Presumptive negative   08/19/18  17:59    


 


Drugs of Abuse Note  Disclamer   08/19/18  17:59    


 


Plasma/Serum Alcohol  < 0.01 % (0-0.07)   08/19/18  17:27

## 2018-08-31 RX ADMIN — HEPARIN SODIUM SCH UNIT: 5000 INJECTION, SOLUTION INTRAVENOUS; SUBCUTANEOUS at 22:00

## 2018-08-31 RX ADMIN — HEPARIN SODIUM SCH UNIT: 5000 INJECTION, SOLUTION INTRAVENOUS; SUBCUTANEOUS at 00:34

## 2018-08-31 RX ADMIN — HEPARIN SODIUM SCH UNIT: 5000 INJECTION, SOLUTION INTRAVENOUS; SUBCUTANEOUS at 12:27

## 2018-08-31 NOTE — XRAY REPORT
Single view abdomen:



History: Dobbhoff placement.



Findings:



Tip of Dobbhoff feeding tube is in the fundus of the way from the GE 

junction.



Impression:



Tip of Dobbhoff feeding tube in fundus of the stomach.

## 2018-08-31 NOTE — XRAY REPORT
FINAL REPORT



EXAM:  XR ABDOMEN 1V AP



HISTORY:  verify placement of NG tube 



TECHNIQUE:  A portable upright view of the upper abdomen was

obtained for evaluation of NG tube placement. Comparison is made

to the study of 08/29/2018.



FINDINGS:  

The tip of the NG tube is coiled in the fundus of the stomach. It

is unchanged in position since previous study. The bowel gas

pattern is unremarkable. The lung bases are clear.



IMPRESSION:  

Stable position of the tip of the NG tube coiled in the fundus of

the stomach.

## 2018-08-31 NOTE — XRAY REPORT
Single view abdomen: Compared to 8/31/18 cope dated 5:15 AM.



Findings:



Moderate amount of air in small and large bowel. No bowels distention. 

Tip of Dobbhoff feeding tube in the fundus of the stomach pointing at 

the GE junction. The tube should be withdrawn approximately 7 cm.



Impression:



Findings as detailed above.

## 2018-08-31 NOTE — PROGRESS NOTE
Assessment and Plan


Assessment and plan: 





58-year-old -American male came from mental health facility for altered 

mental status


Catatonia, Psychiatry stated it is r/o


- mental health consulted


- patient is off Ativan


Metabolic encephalopathy


- Supportive care


- EEG showed moderate diffuse encephalopathy


- Showed some improvement


UTI


- Patient is recultured.  Citrobacter which is pansensitive


- Patient was treated with IV antibiotics


Failure to eat


- Continue Dobbhoff tube feeding


- We will do swallow evaluation Monday


Suspected femoral fracture


-Evaluated by orthopedics, reviewed his imaging and showed no fracture


DVT prophylaxis


Disposition


-patient is off benzo


- Patient need placement, swallow evaluation, if not tolerated may need PEG tube








History


Interval history: 





Patient was seen and evaluated this morning, patient open his eyes and respond 

when i called his name.  Patient is on resistance because he is trying to take 

also OG tube.





Hospitalist Physical





- Physical exam


Narrative exam: 


 Not in cardiopulmonary distress. 


 The patient appeared well nourished and normally developed.


 Vital signs as documented.


 Head exam is unremarkable.


 No scleral icterus .


 Neck is without jugular venous distension, thyromegaly, or carotid bruits. 


 Lungs are clear to auscultation.


Cardiac exam reveals regular rate and  Rhythm. First and second heart sounds 

normal. No murmurs, rubs or gallops. 


Abdominal exam reveals normal bowel sounds, no masses, no organomegaly and no 

aortic enlargement. 


Extremities are nonedematous and both femoral and pedal pulses are normal.


CNS: Patient didn't talk to me.  Patient is very stiff.





- Constitutional


Vitals: 


 











Temp Pulse Resp BP Pulse Ox


 


 97.8 F   84   18   114/76   98 


 


 08/31/18 06:02  08/30/18 23:26  08/31/18 06:02  08/31/18 06:02  08/31/18 06:00











General appearance: Present: no acute distress, other (catatonic)





Results





- Labs


CBC & Chem 7: 


 08/25/18 05:43





 08/25/18 05:43


Labs: 


 Laboratory Last Values











WBC  5.9 K/mm3 (4.5-11.0)   08/25/18  05:43    


 


RBC  4.40 M/mm3 (3.65-5.03)   08/25/18  05:43    


 


Hgb  12.4 gm/dl (11.8-15.2)   08/25/18  05:43    


 


Hct  35.1 % (35.5-45.6)  L  08/25/18  05:43    


 


MCV  80 fl (84-94)  L  08/25/18  05:43    


 


MCH  28 pg (28-32)   08/25/18  05:43    


 


MCHC  35 % (32-34)  H  08/25/18  05:43    


 


RDW  14.5 % (13.2-15.2)   08/25/18  05:43    


 


Plt Count  197 K/mm3 (140-440)   08/25/18  05:43    


 


Lymph % (Auto)  15.1 % (13.4-35.0)   08/25/18  05:43    


 


Mono % (Auto)  8.1 % (0.0-7.3)  H  08/25/18  05:43    


 


Eos % (Auto)  2.2 % (0.0-4.3)   08/25/18  05:43    


 


Baso % (Auto)  0.4 % (0.0-1.8)   08/25/18  05:43    


 


Lymph #  0.9 K/mm3 (1.2-5.4)  L  08/25/18  05:43    


 


Mono #  0.5 K/mm3 (0.0-0.8)   08/25/18  05:43    


 


Eos #  0.1 K/mm3 (0.0-0.4)   08/25/18  05:43    


 


Baso #  0.0 K/mm3 (0.0-0.1)   08/25/18  05:43    


 


Total Counted  Cancelled   08/19/18  17:27    


 


Seg Neutrophils %  74.2 % (40.0-70.0)  H  08/25/18  05:43    


 


Seg Neuts % (Manual)  Cancelled   08/19/18  17:27    


 


Band Neutrophils %  Cancelled   08/19/18  17:27    


 


Lymphocytes % (Manual)  Cancelled   08/19/18  17:27    


 


Reactive Lymphs % (Man)  Cancelled   08/19/18  17:27    


 


Monocytes % (Manual)  Cancelled   08/19/18  17:27    


 


Eosinophils % (Manual)  Cancelled   08/19/18  17:27    


 


Basophils % (Manual)  Cancelled   08/19/18  17:27    


 


Metamyelocytes %  Cancelled   08/19/18  17:27    


 


Myelocytes %  Cancelled   08/19/18  17:27    


 


Promyelocytes %  Cancelled   08/19/18  17:27    


 


Blast Cells %  Cancelled   08/19/18  17:27    


 


Nucleated RBC %  Cancelled   08/19/18  17:27    


 


Seg Neutrophils #  4.4 K/mm3 (1.8-7.7)   08/25/18  05:43    


 


Seg Neutrophils # Man  Cancelled   08/19/18  17:27    


 


Band Neutrophils #  Cancelled   08/19/18  17:27    


 


Lymphocytes # (Manual)  Cancelled   08/19/18  17:27    


 


Abs React Lymphs (Man)  Cancelled   08/19/18  17:27    


 


Monocytes # (Manual)  Cancelled   08/19/18  17:27    


 


Eosinophils # (Manual)  Cancelled   08/19/18  17:27    


 


Basophils # (Manual)  Cancelled   08/19/18  17:27    


 


Metamyelocytes #  Cancelled   08/19/18  17:27    


 


Myelocytes #  Cancelled   08/19/18  17:27    


 


Promyelocytes #  Cancelled   08/19/18  17:27    


 


Blast Cells #  Cancelled   08/19/18  17:27    


 


WBC Morphology  Cancelled   08/19/18  17:27    


 


Hypersegmented Neuts  Cancelled   08/19/18  17:27    


 


Hyposegmented Neuts  Cancelled   08/19/18  17:27    


 


Hypogranular Neuts  Cancelled   08/19/18  17:27    


 


Hypersegmented Polys  Cancelled   08/19/18  17:27    


 


Smudge Cells  Cancelled   08/19/18  17:27    


 


Toxic Granulation  Cancelled   08/19/18  17:27    


 


Toxic Vacuolation  Cancelled   08/19/18  17:27    


 


Dohle Bodies  Cancelled   08/19/18  17:27    


 


Pelger-Huet Anomaly  Cancelled   08/19/18  17:27    


 


Phillip Rods  Cancelled   08/19/18  17:27    


 


Platelet Estimate  Cancelled   08/19/18  17:27    


 


Clumped Platelets  Cancelled   08/19/18  17:27    


 


Plt Clumps, EDTA  Cancelled   08/19/18  17:27    


 


Large Platelets  Cancelled   08/19/18  17:27    


 


Giant Platelets  Cancelled   08/19/18  17:27    


 


Platelet Satelliting  Cancelled   08/19/18  17:27    


 


Plt Morphology Comment  Cancelled   08/19/18  17:27    


 


RBC Morphology  Cancelled   08/19/18  17:27    


 


Dimorphic RBCs  Cancelled   08/19/18  17:27    


 


Polychromasia  Cancelled   08/19/18  17:27    


 


Hypochromasia  Cancelled   08/19/18  17:27    


 


Poikilocytosis  Cancelled   08/19/18  17:27    


 


Basophilic Stippling  Cancelled   08/19/18  17:27    


 


Anisocytosis  Cancelled   08/19/18  17:27    


 


Microcytosis  Cancelled   08/19/18  17:27    


 


Macrocytosis  Cancelled   08/19/18  17:27    


 


Spherocytes  Cancelled   08/19/18  17:27    


 


Pappenheimer Bodies  Cancelled   08/19/18  17:27    


 


Sickle Cells  Cancelled   08/19/18  17:27    


 


Target Cells  Cancelled   08/19/18  17:27    


 


Tear Drop Cells  Cancelled   08/19/18  17:27    


 


Ovalocytes  Cancelled   08/19/18  17:27    


 


Stomatocytes  Cancelled   08/19/18  17:27    


 


Helmet Cells  Cancelled   08/19/18  17:27    


 


Burns-Jolly Bodies  Cancelled   08/19/18  17:27    


 


Cabot Rings  Cancelled   08/19/18  17:27    


 


[place] Cells  Cancelled   08/19/18  17:27    


 


Bite Cells  Cancelled   08/19/18  17:27    


 


Crenated Cell  Cancelled   08/19/18  17:27    


 


Elliptocytes  Cancelled   08/19/18  17:27    


 


Acanthocytes (Spur)  Cancelled   08/19/18  17:27    


 


Rouleaux  Cancelled   08/19/18  17:27    


 


Hemoglobin C Crystals  Cancelled   08/19/18  17:27    


 


Schistocytes  Cancelled   08/19/18  17:27    


 


Malaria parasites  Cancelled   08/19/18  17:27    


 


Abhishek Bodies  Cancelled   08/19/18  17:27    


 


Hem Pathologist Commnt  Cancelled   08/19/18  17:27    


 


Sodium  135 mmol/L (137-145)  L  08/25/18  05:43    


 


Potassium  3.7 mmol/L (3.6-5.0)   08/25/18  05:43    


 


Chloride  99.9 mmol/L ()   08/25/18  05:43    


 


Carbon Dioxide  24 mmol/L (22-30)   08/25/18  05:43    


 


Anion Gap  15 mmol/L  08/25/18  05:43    


 


BUN  2 mg/dL (9-20)  L  08/25/18  05:43    


 


Creatinine  0.6 mg/dL (0.8-1.5)  L  08/25/18  05:43    


 


Estimated GFR  > 60 ml/min  08/25/18  05:43    


 


BUN/Creatinine Ratio  3 %  08/25/18  05:43    


 


Glucose  105 mg/dL ()  H  08/25/18  05:43    


 


POC Glucose  106  ()  H  08/29/18  11:06    


 


Calcium  9.0 mg/dL (8.4-10.2)   08/25/18  05:43    


 


Magnesium  2.20 mg/dL (1.7-2.3)   08/19/18  17:24    


 


Total Bilirubin  1.20 mg/dL (0.1-1.2)   08/19/18  17:27    


 


AST  50 units/L (5-40)  H  08/19/18  17:27    


 


ALT  63 units/L (7-56)  H  08/19/18  17:27    


 


Alkaline Phosphatase  67 units/L ()   08/19/18  17:27    


 


Ammonia  42.0 umol/L (25-60)   08/25/18  15:17    


 


Total Creatine Kinase  148 units/L ()   08/19/18  17:44    


 


CK-MB (CK-2)  1.6 ng/mL (0.0-4.0)   08/19/18  17:44    


 


CK-MB (CK-2) Rel Index  1.0  (0-4)   08/19/18  17:44    


 


Troponin T  < 0.010 ng/mL (0.00-0.029)   08/19/18  17:43    


 


Total Protein  7.2 g/dL (6.3-8.2)   08/19/18  17:27    


 


Albumin  3.6 g/dL (3.9-5)  L  08/19/18  17:27    


 


Albumin/Globulin Ratio  1.0 %  08/19/18  17:27    


 


Vitamin B12  870.0 pg/mL (211-911)   08/25/18  15:13    


 


Folate  6.55 ng/mL (7.3-26.0)  L  08/25/18  15:15    


 


TSH  2.910 mlU/mL (0.270-4.200)   08/25/18  15:16    


 


Free T4  0.92 ng/dL (0.76-1.46)   08/19/18  17:27    


 


Urine Color  Shamika  (Yellow)   08/19/18  17:59    


 


Urine Turbidity  Cloudy  (Clear)   08/19/18  17:59    


 


Urine pH  7.0  (5.0-7.0)   08/19/18  17:59    


 


Ur Specific Gravity  1.021  (1.003-1.030)   08/19/18  17:59    


 


Urine Protein  30 mg/dl mg/dL (Negative)   08/19/18  17:59    


 


Urine Glucose (UA)  Neg mg/dL (Negative)   08/19/18  17:59    


 


Urine Ketones  Neg mg/dL (Negative)   08/19/18  17:59    


 


Urine Blood  Neg  (Negative)   08/19/18  17:59    


 


Urine Nitrite  Neg  (Negative)   08/19/18  17:59    


 


Urine Bilirubin  Neg  (Negative)   08/19/18  17:59    


 


Urine Urobilinogen  4.0 mg/dL (<2.0)   08/19/18  17:59    


 


Ur Leukocyte Esterase  Lg  (Negative)   08/19/18  17:59    


 


Urine WBC (Auto)  92.0 /HPF (0.0-6.0)  H  08/19/18  17:59    


 


Urine RBC (Auto)  6.0 /HPF (0.0-6.0)   08/19/18  17:59    


 


U Epithel Cells (Auto)  < 1.0 /HPF (0-13.0)   08/19/18  17:59    


 


Urine Bacteria (Auto)  3+ /HPF (Negative)   08/19/18  17:59    


 


Ur Transition Epith Cell  2 /HPF  08/19/18  17:59    


 


Urine Mucus  1+ /HPF  08/19/18  17:59    


 


Salicylates  < 0.3 mg/dL (2.8-20.0)  L  08/19/18  17:27    


 


Urine Opiates Screen  Presumptive negative   08/19/18  17:59    


 


Urine Methadone Screen  Presumptive negative   08/19/18  17:59    


 


Acetaminophen  < 5.0 ug/mL (10.0-30.0)  L  08/19/18  17:27    


 


Ur Barbiturates Screen  Presumptive negative   08/19/18  17:59    


 


Ur Phencyclidine Scrn  Presumptive negative   08/19/18  17:59    


 


Ur Amphetamines Screen  Presumptive negative   08/19/18  17:59    


 


U Benzodiazepines Scrn  Presumptive negative   08/19/18  17:59    


 


Urine Cocaine Screen  Presumptive negative   08/19/18  17:59    


 


U Marijuana (THC) Screen  Presumptive negative   08/19/18  17:59    


 


Drugs of Abuse Note  Disclamer   08/19/18  17:59    


 


Plasma/Serum Alcohol  < 0.01 % (0-0.07)   08/19/18  17:27

## 2018-09-01 RX ADMIN — HEPARIN SODIUM SCH UNIT: 5000 INJECTION, SOLUTION INTRAVENOUS; SUBCUTANEOUS at 22:05

## 2018-09-01 RX ADMIN — HEPARIN SODIUM SCH UNIT: 5000 INJECTION, SOLUTION INTRAVENOUS; SUBCUTANEOUS at 11:37

## 2018-09-01 RX ADMIN — DEXTROSE AND SODIUM CHLORIDE SCH MLS/HR: 5; .45 INJECTION, SOLUTION INTRAVENOUS at 22:05

## 2018-09-01 NOTE — XRAY REPORT
FINAL REPORT



EXAM:  XR PELVIS 1-2V



HISTORY:  not able to move 



TECHNIQUE:  AP pelvis



PRIORS:  8/19/2018



FINDINGS:  

The pelvis is intact. There is no fracture or focal osseous

lesion. Hip joint spaces are maintained. There is a bony protrude

rinse from the left anterior superior iliac spine region which

may represent an osteochondroma. Note that a small exostosis was

also seen involving the proximal fibula. There also one involving

the right posterior femur from 8/19/2018. 



IMPRESSION:  

No acute abnormality seen. Bony protruberance involving anterior

superior iliac spine region may represent an osteochondroma. A

note that there also exostoses involving the left proximal fibula

and posterior right femur seen. Findings could represent

hereditary multiple osteochondromas.

## 2018-09-01 NOTE — XRAY REPORT
FINAL REPORT



EXAM:  XR ABDOMEN 1V AP



HISTORY:  verification of NG tube placement 



COMPARISON:  None available. 



FINDINGS:  

AP view of the abdomen obtained. Stable positioning of feeding

tube. Distal tip is coiled within the proximal stomach with the

distal tip projecting course the superior margin of the stomach.

Stable mild nonspecific gas-filled prominence of large and small

bowel loops. 



IMPRESSION:  

Stable positioning of feeding tube. Distal tip is coiled on

itself and projects over the proximal stomach.

## 2018-09-01 NOTE — XRAY REPORT
FINAL REPORT



EXAM:  XR ABDOMEN 1V AP



HISTORY:  dobhoff tube placement 



COMPARISON:  August 31, 2018. 



FINDINGS:  

AP view of the abdomen obtained. Distal tip of feeding tube

projects over the proximal stomach and is partially coiled on

itself. Moderate stool in the colon. Gas is scattered within

nondilated bowel. 



IMPRESSION:  

Distal tip of feeding tube projects over the proximal stomach is

partially coiled on itself. Distal tip is approximately 13

centimeters from the proximal duodenum.

## 2018-09-01 NOTE — XRAY REPORT
FINAL REPORT



EXAM:  XR FEMUR 1V LT



HISTORY:  not able to move the left foot 



TECHNIQUE:  Left femur, AP



PRIORS:  None.



FINDINGS:  

The femur is intact. No fracture seen. There is no dislocation at

the hip. There is soft tissue calcification lateral to the mid to

distal femoral shaft of uncertain etiology. There also a

calcification projecting above the patella. Small exostosis noted

at the proximal fibula.



IMPRESSION:  

No femur fracture or focal femur lesion. Soft tissue

calcifications lateral to mid to distal femoral shaft.

Nonspecific calcification above the patella. Small proximal

fibular exostosis.

## 2018-09-01 NOTE — PROGRESS NOTE
Assessment and Plan


Assessment and plan: 





58-year-old -American male came from mental health facility for altered 

mental status


Catatonia, Psychiatry stated it is r/o


- mental health consulted


- patient is off Ativan


Metabolic encephalopathy


- Supportive care


- EEG showed moderate diffuse encephalopathy


- Showed some improvement


- CT normal finding


UTI


- Patient is recultured.  Citrobacter which is pansensitive


- Patient was treated with IV antibiotics


Failure to eat


- Continue Dobbhoff tube feeding


- We will do swallow evaluation Monday


Suspected femoral fracture


-Evaluated by orthopedics, reviewed his imaging and showed no fracture


DVT prophylaxis


Disposition


-patient is off benzo


- Patient need placement, swallow evaluation, if not tolerated may need PEG tube


Have discussed the management plan in detail with his families and declined to 

get PEG tube








History


Interval history: 





Patient was seen and evaluated this morning, patient open his eyes and respond 

when i called his name.  Family members were in the room, and his daughter in 

law was asking to have x-ray of the leg which i didn't recommend. I have told 

her about his evaluation by Dr Wayne and even gave her a copy which says the 

patient didn't have fracture but she keep says Dr Manzanares told me he has 

fracture. I agreed to repeat the xray of the hip and the femur. She was upset. 

explained in detail the management plan including psych and neurology 

recommendation.





Hospitalist Physical





- Physical exam


Narrative exam: 


 Not in cardiopulmonary distress. On NG tube feeding.


 The patient appeared well nourished and normally developed.


 Vital signs as documented.


 Head exam is unremarkable.


 No scleral icterus .


 Neck is without jugular venous distension, thyromegaly, or carotid bruits. 


 Lungs are clear to auscultation.


Cardiac exam reveals regular rate and  Rhythm. First and second heart sounds 

normal. No murmurs, rubs or gallops. 


Abdominal exam reveals normal bowel sounds, no masses, no organomegaly and no 

aortic enlargement. 


Extremities are nonedematous and both femoral and pedal pulses are normal. Left 

leg is still stiff.


CNS: Patient say ha when i called him ana.





- Constitutional


Vitals: 


 











Temp Pulse Resp BP Pulse Ox


 


 95.0 F L  81   18   120/67   98 


 


 09/01/18 05:44  09/01/18 05:44  09/01/18 05:44  09/01/18 05:44  09/01/18 05:44











General appearance: Present: no acute distress, other (catatonic)





Results





- Labs


CBC & Chem 7: 


 08/25/18 05:43





 08/25/18 05:43


Labs: 


 Laboratory Last Values











WBC  5.9 K/mm3 (4.5-11.0)   08/25/18  05:43    


 


RBC  4.40 M/mm3 (3.65-5.03)   08/25/18  05:43    


 


Hgb  12.4 gm/dl (11.8-15.2)   08/25/18  05:43    


 


Hct  35.1 % (35.5-45.6)  L  08/25/18  05:43    


 


MCV  80 fl (84-94)  L  08/25/18  05:43    


 


MCH  28 pg (28-32)   08/25/18  05:43    


 


MCHC  35 % (32-34)  H  08/25/18  05:43    


 


RDW  14.5 % (13.2-15.2)   08/25/18  05:43    


 


Plt Count  197 K/mm3 (140-440)   08/25/18  05:43    


 


Lymph % (Auto)  15.1 % (13.4-35.0)   08/25/18  05:43    


 


Mono % (Auto)  8.1 % (0.0-7.3)  H  08/25/18  05:43    


 


Eos % (Auto)  2.2 % (0.0-4.3)   08/25/18  05:43    


 


Baso % (Auto)  0.4 % (0.0-1.8)   08/25/18  05:43    


 


Lymph #  0.9 K/mm3 (1.2-5.4)  L  08/25/18  05:43    


 


Mono #  0.5 K/mm3 (0.0-0.8)   08/25/18  05:43    


 


Eos #  0.1 K/mm3 (0.0-0.4)   08/25/18  05:43    


 


Baso #  0.0 K/mm3 (0.0-0.1)   08/25/18  05:43    


 


Total Counted  Cancelled   08/19/18  17:27    


 


Seg Neutrophils %  74.2 % (40.0-70.0)  H  08/25/18  05:43    


 


Seg Neuts % (Manual)  Cancelled   08/19/18  17:27    


 


Band Neutrophils %  Cancelled   08/19/18  17:27    


 


Lymphocytes % (Manual)  Cancelled   08/19/18  17:27    


 


Reactive Lymphs % (Man)  Cancelled   08/19/18  17:27    


 


Monocytes % (Manual)  Cancelled   08/19/18  17:27    


 


Eosinophils % (Manual)  Cancelled   08/19/18  17:27    


 


Basophils % (Manual)  Cancelled   08/19/18  17:27    


 


Metamyelocytes %  Cancelled   08/19/18  17:27    


 


Myelocytes %  Cancelled   08/19/18  17:27    


 


Promyelocytes %  Cancelled   08/19/18  17:27    


 


Blast Cells %  Cancelled   08/19/18  17:27    


 


Nucleated RBC %  Cancelled   08/19/18  17:27    


 


Seg Neutrophils #  4.4 K/mm3 (1.8-7.7)   08/25/18  05:43    


 


Seg Neutrophils # Man  Cancelled   08/19/18  17:27    


 


Band Neutrophils #  Cancelled   08/19/18  17:27    


 


Lymphocytes # (Manual)  Cancelled   08/19/18  17:27    


 


Abs React Lymphs (Man)  Cancelled   08/19/18  17:27    


 


Monocytes # (Manual)  Cancelled   08/19/18  17:27    


 


Eosinophils # (Manual)  Cancelled   08/19/18  17:27    


 


Basophils # (Manual)  Cancelled   08/19/18  17:27    


 


Metamyelocytes #  Cancelled   08/19/18  17:27    


 


Myelocytes #  Cancelled   08/19/18  17:27    


 


Promyelocytes #  Cancelled   08/19/18  17:27    


 


Blast Cells #  Cancelled   08/19/18  17:27    


 


WBC Morphology  Cancelled   08/19/18  17:27    


 


Hypersegmented Neuts  Cancelled   08/19/18  17:27    


 


Hyposegmented Neuts  Cancelled   08/19/18  17:27    


 


Hypogranular Neuts  Cancelled   08/19/18  17:27    


 


Hypersegmented Polys  Cancelled   08/19/18  17:27    


 


Smudge Cells  Cancelled   08/19/18  17:27    


 


Toxic Granulation  Cancelled   08/19/18  17:27    


 


Toxic Vacuolation  Cancelled   08/19/18  17:27    


 


Dohle Bodies  Cancelled   08/19/18  17:27    


 


Pelger-Huet Anomaly  Cancelled   08/19/18  17:27    


 


Phillip Rods  Cancelled   08/19/18  17:27    


 


Platelet Estimate  Cancelled   08/19/18  17:27    


 


Clumped Platelets  Cancelled   08/19/18  17:27    


 


Plt Clumps, EDTA  Cancelled   08/19/18  17:27    


 


Large Platelets  Cancelled   08/19/18  17:27    


 


Giant Platelets  Cancelled   08/19/18  17:27    


 


Platelet Satelliting  Cancelled   08/19/18  17:27    


 


Plt Morphology Comment  Cancelled   08/19/18  17:27    


 


RBC Morphology  Cancelled   08/19/18  17:27    


 


Dimorphic RBCs  Cancelled   08/19/18  17:27    


 


Polychromasia  Cancelled   08/19/18  17:27    


 


Hypochromasia  Cancelled   08/19/18  17:27    


 


Poikilocytosis  Cancelled   08/19/18  17:27    


 


Basophilic Stippling  Cancelled   08/19/18  17:27    


 


Anisocytosis  Cancelled   08/19/18  17:27    


 


Microcytosis  Cancelled   08/19/18  17:27    


 


Macrocytosis  Cancelled   08/19/18  17:27    


 


Spherocytes  Cancelled   08/19/18  17:27    


 


Pappenheimer Bodies  Cancelled   08/19/18  17:27    


 


Sickle Cells  Cancelled   08/19/18  17:27    


 


Target Cells  Cancelled   08/19/18  17:27    


 


Tear Drop Cells  Cancelled   08/19/18  17:27    


 


Ovalocytes  Cancelled   08/19/18  17:27    


 


Stomatocytes  Cancelled   08/19/18  17:27    


 


Helmet Cells  Cancelled   08/19/18  17:27    


 


Burns-Jolly Bodies  Cancelled   08/19/18  17:27    


 


Cabot Rings  Cancelled   08/19/18  17:27    


 


[place] Cells  Cancelled   08/19/18  17:27    


 


Bite Cells  Cancelled   08/19/18  17:27    


 


Crenated Cell  Cancelled   08/19/18  17:27    


 


Elliptocytes  Cancelled   08/19/18  17:27    


 


Acanthocytes (Spur)  Cancelled   08/19/18  17:27    


 


Rouleaux  Cancelled   08/19/18  17:27    


 


Hemoglobin C Crystals  Cancelled   08/19/18  17:27    


 


Schistocytes  Cancelled   08/19/18  17:27    


 


Malaria parasites  Cancelled   08/19/18  17:27    


 


Abhishek Bodies  Cancelled   08/19/18  17:27    


 


Hem Pathologist Commnt  Cancelled   08/19/18  17:27    


 


Sodium  135 mmol/L (137-145)  L  08/25/18  05:43    


 


Potassium  3.7 mmol/L (3.6-5.0)   08/25/18  05:43    


 


Chloride  99.9 mmol/L ()   08/25/18  05:43    


 


Carbon Dioxide  24 mmol/L (22-30)   08/25/18  05:43    


 


Anion Gap  15 mmol/L  08/25/18  05:43    


 


BUN  2 mg/dL (9-20)  L  08/25/18  05:43    


 


Creatinine  0.6 mg/dL (0.8-1.5)  L  08/25/18  05:43    


 


Estimated GFR  > 60 ml/min  08/25/18  05:43    


 


BUN/Creatinine Ratio  3 %  08/25/18  05:43    


 


Glucose  105 mg/dL ()  H  08/25/18  05:43    


 


POC Glucose  106  ()  H  08/29/18  11:06    


 


Calcium  9.0 mg/dL (8.4-10.2)   08/25/18  05:43    


 


Magnesium  2.20 mg/dL (1.7-2.3)   08/19/18  17:24    


 


Total Bilirubin  1.20 mg/dL (0.1-1.2)   08/19/18  17:27    


 


AST  50 units/L (5-40)  H  08/19/18  17:27    


 


ALT  63 units/L (7-56)  H  08/19/18  17:27    


 


Alkaline Phosphatase  67 units/L ()   08/19/18  17:27    


 


Ammonia  42.0 umol/L (25-60)   08/25/18  15:17    


 


Total Creatine Kinase  148 units/L ()   08/19/18  17:44    


 


CK-MB (CK-2)  1.6 ng/mL (0.0-4.0)   08/19/18  17:44    


 


CK-MB (CK-2) Rel Index  1.0  (0-4)   08/19/18  17:44    


 


Troponin T  < 0.010 ng/mL (0.00-0.029)   08/19/18  17:43    


 


Total Protein  7.2 g/dL (6.3-8.2)   08/19/18  17:27    


 


Albumin  3.6 g/dL (3.9-5)  L  08/19/18  17:27    


 


Albumin/Globulin Ratio  1.0 %  08/19/18  17:27    


 


Vitamin B12  870.0 pg/mL (211-911)   08/25/18  15:13    


 


Folate  6.55 ng/mL (7.3-26.0)  L  08/25/18  15:15    


 


TSH  2.910 mlU/mL (0.270-4.200)   08/25/18  15:16    


 


Free T4  0.92 ng/dL (0.76-1.46)   08/19/18  17:27    


 


Urine Color  Shamika  (Yellow)   08/19/18  17:59    


 


Urine Turbidity  Cloudy  (Clear)   08/19/18  17:59    


 


Urine pH  7.0  (5.0-7.0)   08/19/18  17:59    


 


Ur Specific Gravity  1.021  (1.003-1.030)   08/19/18  17:59    


 


Urine Protein  30 mg/dl mg/dL (Negative)   08/19/18  17:59    


 


Urine Glucose (UA)  Neg mg/dL (Negative)   08/19/18  17:59    


 


Urine Ketones  Neg mg/dL (Negative)   08/19/18  17:59    


 


Urine Blood  Neg  (Negative)   08/19/18  17:59    


 


Urine Nitrite  Neg  (Negative)   08/19/18  17:59    


 


Urine Bilirubin  Neg  (Negative)   08/19/18  17:59    


 


Urine Urobilinogen  4.0 mg/dL (<2.0)   08/19/18  17:59    


 


Ur Leukocyte Esterase  Lg  (Negative)   08/19/18  17:59    


 


Urine WBC (Auto)  92.0 /HPF (0.0-6.0)  H  08/19/18  17:59    


 


Urine RBC (Auto)  6.0 /HPF (0.0-6.0)   08/19/18  17:59    


 


U Epithel Cells (Auto)  < 1.0 /HPF (0-13.0)   08/19/18  17:59    


 


Urine Bacteria (Auto)  3+ /HPF (Negative)   08/19/18  17:59    


 


Ur Transition Epith Cell  2 /HPF  08/19/18  17:59    


 


Urine Mucus  1+ /HPF  08/19/18  17:59    


 


Salicylates  < 0.3 mg/dL (2.8-20.0)  L  08/19/18  17:27    


 


Urine Opiates Screen  Presumptive negative   08/19/18  17:59    


 


Urine Methadone Screen  Presumptive negative   08/19/18  17:59    


 


Acetaminophen  < 5.0 ug/mL (10.0-30.0)  L  08/19/18  17:27    


 


Ur Barbiturates Screen  Presumptive negative   08/19/18  17:59    


 


Ur Phencyclidine Scrn  Presumptive negative   08/19/18  17:59    


 


Ur Amphetamines Screen  Presumptive negative   08/19/18  17:59    


 


U Benzodiazepines Scrn  Presumptive negative   08/19/18  17:59    


 


Urine Cocaine Screen  Presumptive negative   08/19/18  17:59    


 


U Marijuana (THC) Screen  Presumptive negative   08/19/18  17:59    


 


Drugs of Abuse Note  Disclamer   08/19/18  17:59    


 


Plasma/Serum Alcohol  < 0.01 % (0-0.07)   08/19/18  17:27

## 2018-09-02 RX ADMIN — HEPARIN SODIUM SCH UNIT: 5000 INJECTION, SOLUTION INTRAVENOUS; SUBCUTANEOUS at 21:21

## 2018-09-02 RX ADMIN — DEXTROSE AND SODIUM CHLORIDE SCH MLS/HR: 5; .45 INJECTION, SOLUTION INTRAVENOUS at 21:19

## 2018-09-02 RX ADMIN — HEPARIN SODIUM SCH UNIT: 5000 INJECTION, SOLUTION INTRAVENOUS; SUBCUTANEOUS at 09:49

## 2018-09-02 RX ADMIN — DEXTROSE AND SODIUM CHLORIDE SCH MLS/HR: 5; .45 INJECTION, SOLUTION INTRAVENOUS at 08:10

## 2018-09-02 NOTE — PROGRESS NOTE
Assessment and Plan


Assessment and plan: 





58-year-old -American male came from mental health facility for altered 

mental status


Catatonia, Psychiatry stated it is r/o


- mental health consulted, and signed off


- patient is off Ativan


Metabolic encephalopathy


- Supportive care


- EEG showed moderate diffuse encephalopathy


- Showed some improvement


- CT normal finding, MRI showed encephalopathy


UTI


- Treated with IV antibiotics and resolved


Failure to eat


- Continue Dobbhoff tube feeding


- We will do swallow evaluation Monday


Suspected femoral fracture


-Evaluated by orthopedics, reviewed his imaging and showed no fracture


-Per families request x-ray of the hip and femur was done and showed no fracture


DVT prophylaxis


Disposition


- patient is off benzo


- Patient need placement, swallow evaluation, if not tolerated may need PEG tube


Have discussed the management plan in detail with his families and declined to 

get PEG tube. 


Discharge Planning issues; families declined PEG tube, uninsured, Families 

expectation (they want him to stand up and walk again), I doubt he may able to 

do that , though it showed some improvement.











History


Interval history: 





Patient was seen and evaluated this morning, patient open his eyes and respond 

when I called his name. Per families request x-ray of the hip and left femur 

was done negative for fracture.





Hospitalist Physical





- Physical exam


Narrative exam: 


 Not in cardiopulmonary distress. On NG tube feeding.


 The patient appeared well nourished and normally developed.


 Vital signs as documented.


 Head exam is unremarkable.


 No scleral icterus .


 Neck is without jugular venous distension, thyromegaly, or carotid bruits. 


 Lungs are clear to auscultation.


Cardiac exam reveals regular rate and  Rhythm. First and second heart sounds 

normal. No murmurs, rubs or gallops. 


Abdominal exam reveals normal bowel sounds, no masses, no organomegaly and no 

aortic enlargement. 


Extremities are nonedematous and both femoral and pedal pulses are normal. Left 

leg and arm is still stiff.


CNS: Patient say ha when i called him ana.





- Constitutional


Vitals: 


 











Temp Pulse Resp BP Pulse Ox


 


 98.4 F   92 H  18   125/83   98 


 


 09/02/18 05:21  09/02/18 05:21  09/02/18 05:21  09/02/18 05:21  09/02/18 05:21











General appearance: Present: no acute distress, other (catatonic)





Results





- Labs


CBC & Chem 7: 


 08/25/18 05:43





 08/25/18 05:43


Labs: 


 Laboratory Last Values











WBC  5.9 K/mm3 (4.5-11.0)   08/25/18  05:43    


 


RBC  4.40 M/mm3 (3.65-5.03)   08/25/18  05:43    


 


Hgb  12.4 gm/dl (11.8-15.2)   08/25/18  05:43    


 


Hct  35.1 % (35.5-45.6)  L  08/25/18  05:43    


 


MCV  80 fl (84-94)  L  08/25/18  05:43    


 


MCH  28 pg (28-32)   08/25/18  05:43    


 


MCHC  35 % (32-34)  H  08/25/18  05:43    


 


RDW  14.5 % (13.2-15.2)   08/25/18  05:43    


 


Plt Count  197 K/mm3 (140-440)   08/25/18  05:43    


 


Lymph % (Auto)  15.1 % (13.4-35.0)   08/25/18  05:43    


 


Mono % (Auto)  8.1 % (0.0-7.3)  H  08/25/18  05:43    


 


Eos % (Auto)  2.2 % (0.0-4.3)   08/25/18  05:43    


 


Baso % (Auto)  0.4 % (0.0-1.8)   08/25/18  05:43    


 


Lymph #  0.9 K/mm3 (1.2-5.4)  L  08/25/18  05:43    


 


Mono #  0.5 K/mm3 (0.0-0.8)   08/25/18  05:43    


 


Eos #  0.1 K/mm3 (0.0-0.4)   08/25/18  05:43    


 


Baso #  0.0 K/mm3 (0.0-0.1)   08/25/18  05:43    


 


Total Counted  Cancelled   08/19/18  17:27    


 


Seg Neutrophils %  74.2 % (40.0-70.0)  H  08/25/18  05:43    


 


Seg Neuts % (Manual)  Cancelled   08/19/18  17:27    


 


Band Neutrophils %  Cancelled   08/19/18  17:27    


 


Lymphocytes % (Manual)  Cancelled   08/19/18  17:27    


 


Reactive Lymphs % (Man)  Cancelled   08/19/18  17:27    


 


Monocytes % (Manual)  Cancelled   08/19/18  17:27    


 


Eosinophils % (Manual)  Cancelled   08/19/18  17:27    


 


Basophils % (Manual)  Cancelled   08/19/18  17:27    


 


Metamyelocytes %  Cancelled   08/19/18  17:27    


 


Myelocytes %  Cancelled   08/19/18  17:27    


 


Promyelocytes %  Cancelled   08/19/18  17:27    


 


Blast Cells %  Cancelled   08/19/18  17:27    


 


Nucleated RBC %  Cancelled   08/19/18  17:27    


 


Seg Neutrophils #  4.4 K/mm3 (1.8-7.7)   08/25/18  05:43    


 


Seg Neutrophils # Man  Cancelled   08/19/18  17:27    


 


Band Neutrophils #  Cancelled   08/19/18  17:27    


 


Lymphocytes # (Manual)  Cancelled   08/19/18  17:27    


 


Abs React Lymphs (Man)  Cancelled   08/19/18  17:27    


 


Monocytes # (Manual)  Cancelled   08/19/18  17:27    


 


Eosinophils # (Manual)  Cancelled   08/19/18  17:27    


 


Basophils # (Manual)  Cancelled   08/19/18  17:27    


 


Metamyelocytes #  Cancelled   08/19/18  17:27    


 


Myelocytes #  Cancelled   08/19/18  17:27    


 


Promyelocytes #  Cancelled   08/19/18  17:27    


 


Blast Cells #  Cancelled   08/19/18  17:27    


 


WBC Morphology  Cancelled   08/19/18  17:27    


 


Hypersegmented Neuts  Cancelled   08/19/18  17:27    


 


Hyposegmented Neuts  Cancelled   08/19/18  17:27    


 


Hypogranular Neuts  Cancelled   08/19/18  17:27    


 


Hypersegmented Polys  Cancelled   08/19/18  17:27    


 


Smudge Cells  Cancelled   08/19/18  17:27    


 


Toxic Granulation  Cancelled   08/19/18  17:27    


 


Toxic Vacuolation  Cancelled   08/19/18  17:27    


 


Dohle Bodies  Cancelled   08/19/18  17:27    


 


Pelger-Huet Anomaly  Cancelled   08/19/18  17:27    


 


Phillip Rods  Cancelled   08/19/18  17:27    


 


Platelet Estimate  Cancelled   08/19/18  17:27    


 


Clumped Platelets  Cancelled   08/19/18  17:27    


 


Plt Clumps, EDTA  Cancelled   08/19/18  17:27    


 


Large Platelets  Cancelled   08/19/18  17:27    


 


Giant Platelets  Cancelled   08/19/18  17:27    


 


Platelet Satelliting  Cancelled   08/19/18  17:27    


 


Plt Morphology Comment  Cancelled   08/19/18  17:27    


 


RBC Morphology  Cancelled   08/19/18  17:27    


 


Dimorphic RBCs  Cancelled   08/19/18  17:27    


 


Polychromasia  Cancelled   08/19/18  17:27    


 


Hypochromasia  Cancelled   08/19/18  17:27    


 


Poikilocytosis  Cancelled   08/19/18  17:27    


 


Basophilic Stippling  Cancelled   08/19/18  17:27    


 


Anisocytosis  Cancelled   08/19/18  17:27    


 


Microcytosis  Cancelled   08/19/18  17:27    


 


Macrocytosis  Cancelled   08/19/18  17:27    


 


Spherocytes  Cancelled   08/19/18  17:27    


 


Pappenheimer Bodies  Cancelled   08/19/18  17:27    


 


Sickle Cells  Cancelled   08/19/18  17:27    


 


Target Cells  Cancelled   08/19/18  17:27    


 


Tear Drop Cells  Cancelled   08/19/18  17:27    


 


Ovalocytes  Cancelled   08/19/18  17:27    


 


Stomatocytes  Cancelled   08/19/18  17:27    


 


Helmet Cells  Cancelled   08/19/18  17:27    


 


Burns-Jolly Bodies  Cancelled   08/19/18  17:27    


 


[place]abot Rings  Cancelled   08/19/18  17:27    


 


[place] Cells  Cancelled   08/19/18  17:27    


 


Bite Cells  Cancelled   08/19/18  17:27    


 


Crenated Cell  Cancelled   08/19/18  17:27    


 


Elliptocytes  Cancelled   08/19/18  17:27    


 


Acanthocytes (Spur)  Cancelled   08/19/18  17:27    


 


Rouleaux  Cancelled   08/19/18  17:27    


 


Hemoglobin C Crystals  Cancelled   08/19/18  17:27    


 


Schistocytes  Cancelled   08/19/18  17:27    


 


Malaria parasites  Cancelled   08/19/18  17:27    


 


Abhishek Bodies  Cancelled   08/19/18  17:27    


 


Hem Pathologist Commnt  Cancelled   08/19/18  17:27    


 


Sodium  135 mmol/L (137-145)  L  08/25/18  05:43    


 


Potassium  3.7 mmol/L (3.6-5.0)   08/25/18  05:43    


 


Chloride  99.9 mmol/L ()   08/25/18  05:43    


 


Carbon Dioxide  24 mmol/L (22-30)   08/25/18  05:43    


 


Anion Gap  15 mmol/L  08/25/18  05:43    


 


BUN  2 mg/dL (9-20)  L  08/25/18  05:43    


 


Creatinine  0.6 mg/dL (0.8-1.5)  L  08/25/18  05:43    


 


Estimated GFR  > 60 ml/min  08/25/18  05:43    


 


BUN/Creatinine Ratio  3 %  08/25/18  05:43    


 


Glucose  105 mg/dL ()  H  08/25/18  05:43    


 


POC Glucose  106  ()  H  08/29/18  11:06    


 


Calcium  9.0 mg/dL (8.4-10.2)   08/25/18  05:43    


 


Magnesium  2.20 mg/dL (1.7-2.3)   08/19/18  17:24    


 


Total Bilirubin  1.20 mg/dL (0.1-1.2)   08/19/18  17:27    


 


AST  50 units/L (5-40)  H  08/19/18  17:27    


 


ALT  63 units/L (7-56)  H  08/19/18  17:27    


 


Alkaline Phosphatase  67 units/L ()   08/19/18  17:27    


 


Ammonia  42.0 umol/L (25-60)   08/25/18  15:17    


 


Total Creatine Kinase  148 units/L ()   08/19/18  17:44    


 


CK-MB (CK-2)  1.6 ng/mL (0.0-4.0)   08/19/18  17:44    


 


CK-MB (CK-2) Rel Index  1.0  (0-4)   08/19/18  17:44    


 


Troponin T  < 0.010 ng/mL (0.00-0.029)   08/19/18  17:43    


 


Total Protein  7.2 g/dL (6.3-8.2)   08/19/18  17:27    


 


Albumin  3.6 g/dL (3.9-5)  L  08/19/18  17:27    


 


Albumin/Globulin Ratio  1.0 %  08/19/18  17:27    


 


Vitamin B12  870.0 pg/mL (211-911)   08/25/18  15:13    


 


Folate  6.55 ng/mL (7.3-26.0)  L  08/25/18  15:15    


 


TSH  2.910 mlU/mL (0.270-4.200)   08/25/18  15:16    


 


Free T4  0.92 ng/dL (0.76-1.46)   08/19/18  17:27    


 


Urine Color  Shamika  (Yellow)   08/19/18  17:59    


 


Urine Turbidity  Cloudy  (Clear)   08/19/18  17:59    


 


Urine pH  7.0  (5.0-7.0)   08/19/18  17:59    


 


Ur Specific Gravity  1.021  (1.003-1.030)   08/19/18  17:59    


 


Urine Protein  30 mg/dl mg/dL (Negative)   08/19/18  17:59    


 


Urine Glucose (UA)  Neg mg/dL (Negative)   08/19/18  17:59    


 


Urine Ketones  Neg mg/dL (Negative)   08/19/18  17:59    


 


Urine Blood  Neg  (Negative)   08/19/18  17:59    


 


Urine Nitrite  Neg  (Negative)   08/19/18  17:59    


 


Urine Bilirubin  Neg  (Negative)   08/19/18  17:59    


 


Urine Urobilinogen  4.0 mg/dL (<2.0)   08/19/18  17:59    


 


Ur Leukocyte Esterase  Lg  (Negative)   08/19/18  17:59    


 


Urine WBC (Auto)  92.0 /HPF (0.0-6.0)  H  08/19/18  17:59    


 


Urine RBC (Auto)  6.0 /HPF (0.0-6.0)   08/19/18  17:59    


 


U Epithel Cells (Auto)  < 1.0 /HPF (0-13.0)   08/19/18  17:59    


 


Urine Bacteria (Auto)  3+ /HPF (Negative)   08/19/18  17:59    


 


Ur Transition Epith Cell  2 /HPF  08/19/18  17:59    


 


Urine Mucus  1+ /HPF  08/19/18  17:59    


 


Salicylates  < 0.3 mg/dL (2.8-20.0)  L  08/19/18  17:27    


 


Urine Opiates Screen  Presumptive negative   08/19/18  17:59    


 


Urine Methadone Screen  Presumptive negative   08/19/18  17:59    


 


Acetaminophen  < 5.0 ug/mL (10.0-30.0)  L  08/19/18  17:27    


 


Ur Barbiturates Screen  Presumptive negative   08/19/18  17:59    


 


Ur Phencyclidine Scrn  Presumptive negative   08/19/18  17:59    


 


Ur Amphetamines Screen  Presumptive negative   08/19/18  17:59    


 


U Benzodiazepines Scrn  Presumptive negative   08/19/18  17:59    


 


Urine Cocaine Screen  Presumptive negative   08/19/18  17:59    


 


U Marijuana (THC) Screen  Presumptive negative   08/19/18  17:59    


 


Drugs of Abuse Note  Disclamer   08/19/18  17:59    


 


Plasma/Serum Alcohol  < 0.01 % (0-0.07)   08/19/18  17:27

## 2018-09-02 NOTE — XRAY REPORT
FINAL REPORT



EXAM:  XR ABDOMEN 1V AP



HISTORY:  for dobhoff tube placement 



TECHNIQUE:  KUB(s) view of abdomen.



PRIORS:  1 September 2018.



FINDINGS:  

Enteric tube extends below the diaphragm, with tip projected over

right epigastric region. Bowel gas pattern grossly unremarkable.

No apparent pneumoperitoneum. Osseous structures grossly

unremarkable. 



IMPRESSION:  

1. Enteric tube position as reported. 



2. Nonobstructive bowel gas pattern.

## 2018-09-03 RX ADMIN — HEPARIN SODIUM SCH UNIT: 5000 INJECTION, SOLUTION INTRAVENOUS; SUBCUTANEOUS at 10:58

## 2018-09-03 RX ADMIN — DEXTROSE AND SODIUM CHLORIDE SCH MLS/HR: 5; .45 INJECTION, SOLUTION INTRAVENOUS at 18:15

## 2018-09-03 RX ADMIN — HEPARIN SODIUM SCH UNIT: 5000 INJECTION, SOLUTION INTRAVENOUS; SUBCUTANEOUS at 21:29

## 2018-09-03 RX ADMIN — DEXTROSE AND SODIUM CHLORIDE SCH MLS/HR: 5; .45 INJECTION, SOLUTION INTRAVENOUS at 07:33

## 2018-09-03 NOTE — PROGRESS NOTE
Assessment and Plan


Assessment and plan: 





58-year-old -American male came from mental health facility for altered 

mental status


Catatonia, Psychiatry r/o catatonia, said the side effect of his pysch 

medications


- mental health consulted, and signed off


Metabolic encephalopathy


- Supportive care


- EEG showed moderate diffuse encephalopathy


- Showed some improvement


- CT normal finding, MRI showed encephalopathy


UTI


- Treated with IV antibiotics and resolved


Failure to eat


- Continue Dobbhoff tube feeding


- We will do swallow evaluation Monday


Suspected femoral fracture


-Evaluated by orthopedics, reviewed his imaging and showed no fracture


-Per families request x-ray of the hip and femur was done and showed no fracture


Nutrition


- patient is on dubhoff


- Patient showed improvement in his mentation


- Patient needs repeat swallow evaluation tomorrow


DVT prophylaxis


Disposition


- Patient need placement, repeat swallow evaluation


Have discussed the management plan in detail with his families and declined to 

get PEG tube. 


Discharge Planning issues: families declined PEG tube, uninsured.











History


Interval history: 





Patient was seen and evaluated this morning, patient  said he is feeling 

better. starts to communicate. Asked for pain patient said no.





Hospitalist Physical





- Physical exam


Narrative exam: 


 Not in cardiopulmonary distress. On NG tube feeding.


 The patient appeared well nourished and normally developed.


 Vital signs as documented.


 Head exam is unremarkable.


 No scleral icterus .


 Neck is without jugular venous distension, thyromegaly, or carotid bruits. 


 Lungs are clear to auscultation.


Cardiac exam reveals regular rate and  Rhythm. First and second heart sounds 

normal. No murmurs, rubs or gallops. 


Abdominal exam reveals normal bowel sounds, no masses, no organomegaly and no 

aortic enlargement. 


Extremities are left leg is slightly stiff, getting progressively better.


CNS: Patient say ha when i called him ana.





- Constitutional


Vitals: 


 











Temp Pulse Resp BP Pulse Ox


 


 98.1 F   82   19   113/75   99 


 


 09/03/18 11:27  09/03/18 04:46  09/03/18 11:27  09/03/18 11:27  09/03/18 04:46











General appearance: Present: no acute distress, other (catatonic)





Results





- Labs


CBC & Chem 7: 


 08/25/18 05:43





 08/25/18 05:43


Labs: 


 Laboratory Last Values











WBC  5.9 K/mm3 (4.5-11.0)   08/25/18  05:43    


 


RBC  4.40 M/mm3 (3.65-5.03)   08/25/18  05:43    


 


Hgb  12.4 gm/dl (11.8-15.2)   08/25/18  05:43    


 


Hct  35.1 % (35.5-45.6)  L  08/25/18  05:43    


 


MCV  80 fl (84-94)  L  08/25/18  05:43    


 


MCH  28 pg (28-32)   08/25/18  05:43    


 


MCHC  35 % (32-34)  H  08/25/18  05:43    


 


RDW  14.5 % (13.2-15.2)   08/25/18  05:43    


 


Plt Count  197 K/mm3 (140-440)   08/25/18  05:43    


 


Lymph % (Auto)  15.1 % (13.4-35.0)   08/25/18  05:43    


 


Mono % (Auto)  8.1 % (0.0-7.3)  H  08/25/18  05:43    


 


Eos % (Auto)  2.2 % (0.0-4.3)   08/25/18  05:43    


 


Baso % (Auto)  0.4 % (0.0-1.8)   08/25/18  05:43    


 


Lymph #  0.9 K/mm3 (1.2-5.4)  L  08/25/18  05:43    


 


Mono #  0.5 K/mm3 (0.0-0.8)   08/25/18  05:43    


 


Eos #  0.1 K/mm3 (0.0-0.4)   08/25/18  05:43    


 


Baso #  0.0 K/mm3 (0.0-0.1)   08/25/18  05:43    


 


Total Counted  Cancelled   08/19/18  17:27    


 


Seg Neutrophils %  74.2 % (40.0-70.0)  H  08/25/18  05:43    


 


Seg Neuts % (Manual)  Cancelled   08/19/18  17:27    


 


Band Neutrophils %  Cancelled   08/19/18  17:27    


 


Lymphocytes % (Manual)  Cancelled   08/19/18  17:27    


 


Reactive Lymphs % (Man)  Cancelled   08/19/18  17:27    


 


Monocytes % (Manual)  Cancelled   08/19/18  17:27    


 


Eosinophils % (Manual)  Cancelled   08/19/18  17:27    


 


Basophils % (Manual)  Cancelled   08/19/18  17:27    


 


Metamyelocytes %  Cancelled   08/19/18  17:27    


 


Myelocytes %  Cancelled   08/19/18  17:27    


 


Promyelocytes %  Cancelled   08/19/18  17:27    


 


Blast Cells %  Cancelled   08/19/18  17:27    


 


Nucleated RBC %  Cancelled   08/19/18  17:27    


 


Seg Neutrophils #  4.4 K/mm3 (1.8-7.7)   08/25/18  05:43    


 


Seg Neutrophils # Man  Cancelled   08/19/18  17:27    


 


Band Neutrophils #  Cancelled   08/19/18  17:27    


 


Lymphocytes # (Manual)  Cancelled   08/19/18  17:27    


 


Abs React Lymphs (Man)  Cancelled   08/19/18  17:27    


 


Monocytes # (Manual)  Cancelled   08/19/18  17:27    


 


Eosinophils # (Manual)  Cancelled   08/19/18  17:27    


 


Basophils # (Manual)  Cancelled   08/19/18  17:27    


 


Metamyelocytes #  Cancelled   08/19/18  17:27    


 


Myelocytes #  Cancelled   08/19/18  17:27    


 


Promyelocytes #  Cancelled   08/19/18  17:27    


 


Blast Cells #  Cancelled   08/19/18  17:27    


 


WBC Morphology  Cancelled   08/19/18  17:27    


 


Hypersegmented Neuts  Cancelled   08/19/18  17:27    


 


Hyposegmented Neuts  Cancelled   08/19/18  17:27    


 


Hypogranular Neuts  Cancelled   08/19/18  17:27    


 


Hypersegmented Polys  Cancelled   08/19/18  17:27    


 


Smudge Cells  Cancelled   08/19/18  17:27    


 


Toxic Granulation  Cancelled   08/19/18  17:27    


 


Toxic Vacuolation  Cancelled   08/19/18  17:27    


 


Dohle Bodies  Cancelled   08/19/18  17:27    


 


Pelger-Huet Anomaly  Cancelled   08/19/18  17:27    


 


Phillip Rods  Cancelled   08/19/18  17:27    


 


Platelet Estimate  Cancelled   08/19/18  17:27    


 


Clumped Platelets  Cancelled   08/19/18  17:27    


 


Plt Clumps, EDTA  Cancelled   08/19/18  17:27    


 


Large Platelets  Cancelled   08/19/18  17:27    


 


Giant Platelets  Cancelled   08/19/18  17:27    


 


Platelet Satelliting  Cancelled   08/19/18  17:27    


 


Plt Morphology Comment  Cancelled   08/19/18  17:27    


 


RBC Morphology  Cancelled   08/19/18  17:27    


 


Dimorphic RBCs  Cancelled   08/19/18  17:27    


 


Polychromasia  Cancelled   08/19/18  17:27    


 


Hypochromasia  Cancelled   08/19/18  17:27    


 


Poikilocytosis  Cancelled   08/19/18  17:27    


 


Basophilic Stippling  Cancelled   08/19/18  17:27    


 


Anisocytosis  Cancelled   08/19/18  17:27    


 


Microcytosis  Cancelled   08/19/18  17:27    


 


Macrocytosis  Cancelled   08/19/18  17:27    


 


Spherocytes  Cancelled   08/19/18  17:27    


 


Pappenheimer Bodies  Cancelled   08/19/18  17:27    


 


Sickle Cells  Cancelled   08/19/18  17:27    


 


Target Cells  Cancelled   08/19/18  17:27    


 


Tear Drop Cells  Cancelled   08/19/18  17:27    


 


Ovalocytes  Cancelled   08/19/18  17:27    


 


Stomatocytes  Cancelled   08/19/18  17:27    


 


Helmet Cells  Cancelled   08/19/18  17:27    


 


Burns-Jolly Bodies  Cancelled   08/19/18  17:27    


 


Cabot Rings  Cancelled   08/19/18  17:27    


 


Tamy Cells  Cancelled   08/19/18  17:27    


 


Bite Cells  Cancelled   08/19/18  17:27    


 


Crenated Cell  Cancelled   08/19/18  17:27    


 


Elliptocytes  Cancelled   08/19/18  17:27    


 


Acanthocytes (Spur)  Cancelled   08/19/18  17:27    


 


Rouleaux  Cancelled   08/19/18  17:27    


 


Hemoglobin C Crystals  Cancelled   08/19/18  17:27    


 


Schistocytes  Cancelled   08/19/18  17:27    


 


Malaria parasites  Cancelled   08/19/18  17:27    


 


Abhishek Bodies  Cancelled   08/19/18  17:27    


 


Hem Pathologist Commnt  Cancelled   08/19/18  17:27    


 


Sodium  135 mmol/L (137-145)  L  08/25/18  05:43    


 


Potassium  3.7 mmol/L (3.6-5.0)   08/25/18  05:43    


 


Chloride  99.9 mmol/L ()   08/25/18  05:43    


 


Carbon Dioxide  24 mmol/L (22-30)   08/25/18  05:43    


 


Anion Gap  15 mmol/L  08/25/18  05:43    


 


BUN  2 mg/dL (9-20)  L  08/25/18  05:43    


 


Creatinine  0.6 mg/dL (0.8-1.5)  L  08/25/18  05:43    


 


Estimated GFR  > 60 ml/min  08/25/18  05:43    


 


BUN/Creatinine Ratio  3 %  08/25/18  05:43    


 


Glucose  105 mg/dL ()  H  08/25/18  05:43    


 


POC Glucose  102  ()   09/03/18  11:26    


 


Calcium  9.0 mg/dL (8.4-10.2)   08/25/18  05:43    


 


Magnesium  2.20 mg/dL (1.7-2.3)   08/19/18  17:24    


 


Total Bilirubin  1.20 mg/dL (0.1-1.2)   08/19/18  17:27    


 


AST  50 units/L (5-40)  H  08/19/18  17:27    


 


ALT  63 units/L (7-56)  H  08/19/18  17:27    


 


Alkaline Phosphatase  67 units/L ()   08/19/18  17:27    


 


Ammonia  42.0 umol/L (25-60)   08/25/18  15:17    


 


Total Creatine Kinase  148 units/L ()   08/19/18  17:44    


 


CK-MB (CK-2)  1.6 ng/mL (0.0-4.0)   08/19/18  17:44    


 


CK-MB (CK-2) Rel Index  1.0  (0-4)   08/19/18  17:44    


 


Troponin T  < 0.010 ng/mL (0.00-0.029)   08/19/18  17:43    


 


Total Protein  7.2 g/dL (6.3-8.2)   08/19/18  17:27    


 


Albumin  3.6 g/dL (3.9-5)  L  08/19/18  17:27    


 


Albumin/Globulin Ratio  1.0 %  08/19/18  17:27    


 


Vitamin B12  870.0 pg/mL (211-911)   08/25/18  15:13    


 


Folate  6.55 ng/mL (7.3-26.0)  L  08/25/18  15:15    


 


TSH  2.910 mlU/mL (0.270-4.200)   08/25/18  15:16    


 


Free T4  0.92 ng/dL (0.76-1.46)   08/19/18  17:27    


 


Urine Color  Shamika  (Yellow)   08/19/18  17:59    


 


Urine Turbidity  Cloudy  (Clear)   08/19/18  17:59    


 


Urine pH  7.0  (5.0-7.0)   08/19/18  17:59    


 


Ur Specific Gravity  1.021  (1.003-1.030)   08/19/18  17:59    


 


Urine Protein  30 mg/dl mg/dL (Negative)   08/19/18  17:59    


 


Urine Glucose (UA)  Neg mg/dL (Negative)   08/19/18  17:59    


 


Urine Ketones  Neg mg/dL (Negative)   08/19/18  17:59    


 


Urine Blood  Neg  (Negative)   08/19/18  17:59    


 


Urine Nitrite  Neg  (Negative)   08/19/18  17:59    


 


Urine Bilirubin  Neg  (Negative)   08/19/18  17:59    


 


Urine Urobilinogen  4.0 mg/dL (<2.0)   08/19/18  17:59    


 


Ur Leukocyte Esterase  Lg  (Negative)   08/19/18  17:59    


 


Urine WBC (Auto)  92.0 /HPF (0.0-6.0)  H  08/19/18  17:59    


 


Urine RBC (Auto)  6.0 /HPF (0.0-6.0)   08/19/18  17:59    


 


U Epithel Cells (Auto)  < 1.0 /HPF (0-13.0)   08/19/18  17:59    


 


Urine Bacteria (Auto)  3+ /HPF (Negative)   08/19/18  17:59    


 


Ur Transition Epith Cell  2 /HPF  08/19/18  17:59    


 


Urine Mucus  1+ /HPF  08/19/18  17:59    


 


Salicylates  < 0.3 mg/dL (2.8-20.0)  L  08/19/18  17:27    


 


Urine Opiates Screen  Presumptive negative   08/19/18  17:59    


 


Urine Methadone Screen  Presumptive negative   08/19/18  17:59    


 


Acetaminophen  < 5.0 ug/mL (10.0-30.0)  L  08/19/18  17:27    


 


Ur Barbiturates Screen  Presumptive negative   08/19/18  17:59    


 


Ur Phencyclidine Scrn  Presumptive negative   08/19/18  17:59    


 


Ur Amphetamines Screen  Presumptive negative   08/19/18  17:59    


 


U Benzodiazepines Scrn  Presumptive negative   08/19/18  17:59    


 


Urine Cocaine Screen  Presumptive negative   08/19/18  17:59    


 


U Marijuana (THC) Screen  Presumptive negative   08/19/18  17:59    


 


Drugs of Abuse Note  Disclamer   08/19/18  17:59    


 


Plasma/Serum Alcohol  < 0.01 % (0-0.07)   08/19/18  17:27

## 2018-09-03 NOTE — VASCULAR LAB REPORT
LEFT UPPER EXTREMITY VENOUS DUPLEX:



REASON FOR EXAM: Pain and swelling of the left upper extremity



COMMENTS ON THE LEFT:

All arm veins visualized are freely compressible without

evidence of internal echogenicity.  The subclavian and internal

jugular veins are free of thrombus.  Flow is spontaneous and phasic 

throughout.



COMMENTS ON THE RIGHT:

A limited study of the jugular and subclavian veins shows no evidence 

of thrombus.



IMPRESSION:

No evidence of acute or chronic deep venous thrombosis in the

left upper extremity.

## 2018-09-03 NOTE — XRAY REPORT
FINAL REPORT



PROCEDURE:  XR ABDOMEN 1V AP



TECHNIQUE:  Abdominal radiograph, single supine AP view. 







HISTORY:  Dobhoff confirmation . 







COMPARISON:  No prior studies are available for comparison.



FINDINGS:  

Bowel gas pattern:Nonobstructive.



Masses or calcifications:None.



Bony structures:No significant abnormality.



Other:The feeding tube ends in the distal stomach.







IMPRESSION:  

The feeding tube ends in the distal stomach

## 2018-09-03 NOTE — PROGRESS NOTE
Assessment and Plan


Assessment and plan: 





58-year-old -American male came from mental health facility for altered 

mental status


Catatonia, Psychiatry stated it is r/o


- mental health consulted, and signed off


- patient is off Ativan


Metabolic encephalopathy


- Supportive care


- EEG showed moderate diffuse encephalopathy


- Showed some improvement


- CT normal finding, MRI showed encephalopathy


UTI


- Treated with IV antibiotics and resolved


Failure to eat


- Continue Dobbhoff tube feeding


- We will do swallow evaluation Monday


Suspected femoral fracture


-Evaluated by orthopedics, reviewed his imaging and showed no fracture


-Per families request x-ray of the hip and femur was done and showed no fracture


DVT prophylaxis


Disposition


- patient is off benzo


- Patient need placement, swallow evaluation, if not tolerated may need PEG tube


Have discussed the management plan in detail with his families and declined to 

get PEG tube. 


Discharge Planning issues; families declined PEG tube, uninsured, Families 

expectation (they want him to stand up and walk again), I doubt he may able to 

do that , though it showed some improvement.











History


Interval history: 





Patient was seen and evaluated this morning, patient open his eyes and respond 

when I called his name. Per families request x-ray of the hip and left femur 

was done negative for fracture.





Hospitalist Physical





- Physical exam


Narrative exam: 


 Not in cardiopulmonary distress. On NG tube feeding.


 The patient appeared well nourished and normally developed.


 Vital signs as documented.


 Head exam is unremarkable.


 No scleral icterus .


 Neck is without jugular venous distension, thyromegaly, or carotid bruits. 


 Lungs are clear to auscultation.


Cardiac exam reveals regular rate and  Rhythm. First and second heart sounds 

normal. No murmurs, rubs or gallops. 


Abdominal exam reveals normal bowel sounds, no masses, no organomegaly and no 

aortic enlargement. 


Extremities are nonedematous and both femoral and pedal pulses are normal. Left 

leg and arm is still stiff.


CNS: Patient say ha when i called him ana.





- Constitutional


Vitals: 


 











Temp Pulse Resp BP Pulse Ox


 


 98.0 F   82   16   123/81   99 


 


 09/03/18 04:46  09/03/18 04:46  09/03/18 04:46  09/03/18 04:46  09/03/18 04:46











General appearance: Present: no acute distress, other (catatonic)





Results





- Labs


CBC & Chem 7: 


 08/25/18 05:43





 08/25/18 05:43


Labs: 


 Laboratory Last Values











WBC  5.9 K/mm3 (4.5-11.0)   08/25/18  05:43    


 


RBC  4.40 M/mm3 (3.65-5.03)   08/25/18  05:43    


 


Hgb  12.4 gm/dl (11.8-15.2)   08/25/18  05:43    


 


Hct  35.1 % (35.5-45.6)  L  08/25/18  05:43    


 


MCV  80 fl (84-94)  L  08/25/18  05:43    


 


MCH  28 pg (28-32)   08/25/18  05:43    


 


MCHC  35 % (32-34)  H  08/25/18  05:43    


 


RDW  14.5 % (13.2-15.2)   08/25/18  05:43    


 


Plt Count  197 K/mm3 (140-440)   08/25/18  05:43    


 


Lymph % (Auto)  15.1 % (13.4-35.0)   08/25/18  05:43    


 


Mono % (Auto)  8.1 % (0.0-7.3)  H  08/25/18  05:43    


 


Eos % (Auto)  2.2 % (0.0-4.3)   08/25/18  05:43    


 


Baso % (Auto)  0.4 % (0.0-1.8)   08/25/18  05:43    


 


Lymph #  0.9 K/mm3 (1.2-5.4)  L  08/25/18  05:43    


 


Mono #  0.5 K/mm3 (0.0-0.8)   08/25/18  05:43    


 


Eos #  0.1 K/mm3 (0.0-0.4)   08/25/18  05:43    


 


Baso #  0.0 K/mm3 (0.0-0.1)   08/25/18  05:43    


 


Total Counted  Cancelled   08/19/18  17:27    


 


Seg Neutrophils %  74.2 % (40.0-70.0)  H  08/25/18  05:43    


 


Seg Neuts % (Manual)  Cancelled   08/19/18  17:27    


 


Band Neutrophils %  Cancelled   08/19/18  17:27    


 


Lymphocytes % (Manual)  Cancelled   08/19/18  17:27    


 


Reactive Lymphs % (Man)  Cancelled   08/19/18  17:27    


 


Monocytes % (Manual)  Cancelled   08/19/18  17:27    


 


Eosinophils % (Manual)  Cancelled   08/19/18  17:27    


 


Basophils % (Manual)  Cancelled   08/19/18  17:27    


 


Metamyelocytes %  Cancelled   08/19/18  17:27    


 


Myelocytes %  Cancelled   08/19/18  17:27    


 


Promyelocytes %  Cancelled   08/19/18  17:27    


 


Blast Cells %  Cancelled   08/19/18  17:27    


 


Nucleated RBC %  Cancelled   08/19/18  17:27    


 


Seg Neutrophils #  4.4 K/mm3 (1.8-7.7)   08/25/18  05:43    


 


Seg Neutrophils # Man  Cancelled   08/19/18  17:27    


 


Band Neutrophils #  Cancelled   08/19/18  17:27    


 


Lymphocytes # (Manual)  Cancelled   08/19/18  17:27    


 


Abs React Lymphs (Man)  Cancelled   08/19/18  17:27    


 


Monocytes # (Manual)  Cancelled   08/19/18  17:27    


 


Eosinophils # (Manual)  Cancelled   08/19/18  17:27    


 


Basophils # (Manual)  Cancelled   08/19/18  17:27    


 


Metamyelocytes #  Cancelled   08/19/18  17:27    


 


Myelocytes #  Cancelled   08/19/18  17:27    


 


Promyelocytes #  Cancelled   08/19/18  17:27    


 


Blast Cells #  Cancelled   08/19/18  17:27    


 


WBC Morphology  Cancelled   08/19/18  17:27    


 


Hypersegmented Neuts  Cancelled   08/19/18  17:27    


 


Hyposegmented Neuts  Cancelled   08/19/18  17:27    


 


Hypogranular Neuts  Cancelled   08/19/18  17:27    


 


Hypersegmented Polys  Cancelled   08/19/18  17:27    


 


Smudge Cells  Cancelled   08/19/18  17:27    


 


Toxic Granulation  Cancelled   08/19/18  17:27    


 


Toxic Vacuolation  Cancelled   08/19/18  17:27    


 


Dohle Bodies  Cancelled   08/19/18  17:27    


 


Pelger-Huet Anomaly  Cancelled   08/19/18  17:27    


 


Phillip Rods  Cancelled   08/19/18  17:27    


 


Platelet Estimate  Cancelled   08/19/18  17:27    


 


Clumped Platelets  Cancelled   08/19/18  17:27    


 


Plt Clumps, EDTA  Cancelled   08/19/18  17:27    


 


Large Platelets  Cancelled   08/19/18  17:27    


 


Giant Platelets  Cancelled   08/19/18  17:27    


 


Platelet Satelliting  Cancelled   08/19/18  17:27    


 


Plt Morphology Comment  Cancelled   08/19/18  17:27    


 


RBC Morphology  Cancelled   08/19/18  17:27    


 


Dimorphic RBCs  Cancelled   08/19/18  17:27    


 


Polychromasia  Cancelled   08/19/18  17:27    


 


Hypochromasia  Cancelled   08/19/18  17:27    


 


Poikilocytosis  Cancelled   08/19/18  17:27    


 


Basophilic Stippling  Cancelled   08/19/18  17:27    


 


Anisocytosis  Cancelled   08/19/18  17:27    


 


Microcytosis  Cancelled   08/19/18  17:27    


 


Macrocytosis  Cancelled   08/19/18  17:27    


 


Spherocytes  Cancelled   08/19/18  17:27    


 


Pappenheimer Bodies  Cancelled   08/19/18  17:27    


 


Sickle Cells  Cancelled   08/19/18  17:27    


 


Target Cells  Cancelled   08/19/18  17:27    


 


Tear Drop Cells  Cancelled   08/19/18  17:27    


 


Ovalocytes  Cancelled   08/19/18  17:27    


 


Stomatocytes  Cancelled   08/19/18  17:27    


 


Helmet Cells  Cancelled   08/19/18  17:27    


 


Burns-Jolly Bodies  Cancelled   08/19/18  17:27    


 


Cabot Rings  Cancelled   08/19/18  17:27    


 


Tamy Cells  Cancelled   08/19/18  17:27    


 


Bite Cells  Cancelled   08/19/18  17:27    


 


Crenated Cell  Cancelled   08/19/18  17:27    


 


Elliptocytes  Cancelled   08/19/18  17:27    


 


Acanthocytes (Spur)  Cancelled   08/19/18  17:27    


 


Rouleaux  Cancelled   08/19/18  17:27    


 


Hemoglobin C Crystals  Cancelled   08/19/18  17:27    


 


Schistocytes  Cancelled   08/19/18  17:27    


 


Malaria parasites  Cancelled   08/19/18  17:27    


 


Abhishek Bodies  Cancelled   08/19/18  17:27    


 


Hem Pathologist Commnt  Cancelled   08/19/18  17:27    


 


Sodium  135 mmol/L (137-145)  L  08/25/18  05:43    


 


Potassium  3.7 mmol/L (3.6-5.0)   08/25/18  05:43    


 


Chloride  99.9 mmol/L ()   08/25/18  05:43    


 


Carbon Dioxide  24 mmol/L (22-30)   08/25/18  05:43    


 


Anion Gap  15 mmol/L  08/25/18  05:43    


 


BUN  2 mg/dL (9-20)  L  08/25/18  05:43    


 


Creatinine  0.6 mg/dL (0.8-1.5)  L  08/25/18  05:43    


 


Estimated GFR  > 60 ml/min  08/25/18  05:43    


 


BUN/Creatinine Ratio  3 %  08/25/18  05:43    


 


Glucose  105 mg/dL ()  H  08/25/18  05:43    


 


POC Glucose  117  ()  H  09/03/18  06:02    


 


Calcium  9.0 mg/dL (8.4-10.2)   08/25/18  05:43    


 


Magnesium  2.20 mg/dL (1.7-2.3)   08/19/18  17:24    


 


Total Bilirubin  1.20 mg/dL (0.1-1.2)   08/19/18  17:27    


 


AST  50 units/L (5-40)  H  08/19/18  17:27    


 


ALT  63 units/L (7-56)  H  08/19/18  17:27    


 


Alkaline Phosphatase  67 units/L ()   08/19/18  17:27    


 


Ammonia  42.0 umol/L (25-60)   08/25/18  15:17    


 


Total Creatine Kinase  148 units/L ()   08/19/18  17:44    


 


CK-MB (CK-2)  1.6 ng/mL (0.0-4.0)   08/19/18  17:44    


 


CK-MB (CK-2) Rel Index  1.0  (0-4)   08/19/18  17:44    


 


Troponin T  < 0.010 ng/mL (0.00-0.029)   08/19/18  17:43    


 


Total Protein  7.2 g/dL (6.3-8.2)   08/19/18  17:27    


 


Albumin  3.6 g/dL (3.9-5)  L  08/19/18  17:27    


 


Albumin/Globulin Ratio  1.0 %  08/19/18  17:27    


 


Vitamin B12  870.0 pg/mL (211-911)   08/25/18  15:13    


 


Folate  6.55 ng/mL (7.3-26.0)  L  08/25/18  15:15    


 


TSH  2.910 mlU/mL (0.270-4.200)   08/25/18  15:16    


 


Free T4  0.92 ng/dL (0.76-1.46)   08/19/18  17:27    


 


Urine Color  Shamika  (Yellow)   08/19/18  17:59    


 


Urine Turbidity  Cloudy  (Clear)   08/19/18  17:59    


 


Urine pH  7.0  (5.0-7.0)   08/19/18  17:59    


 


Ur Specific Gravity  1.021  (1.003-1.030)   08/19/18  17:59    


 


Urine Protein  30 mg/dl mg/dL (Negative)   08/19/18  17:59    


 


Urine Glucose (UA)  Neg mg/dL (Negative)   08/19/18  17:59    


 


Urine Ketones  Neg mg/dL (Negative)   08/19/18  17:59    


 


Urine Blood  Neg  (Negative)   08/19/18  17:59    


 


Urine Nitrite  Neg  (Negative)   08/19/18  17:59    


 


Urine Bilirubin  Neg  (Negative)   08/19/18  17:59    


 


Urine Urobilinogen  4.0 mg/dL (<2.0)   08/19/18  17:59    


 


Ur Leukocyte Esterase  Lg  (Negative)   08/19/18  17:59    


 


Urine WBC (Auto)  92.0 /HPF (0.0-6.0)  H  08/19/18  17:59    


 


Urine RBC (Auto)  6.0 /HPF (0.0-6.0)   08/19/18  17:59    


 


U Epithel Cells (Auto)  < 1.0 /HPF (0-13.0)   08/19/18  17:59    


 


Urine Bacteria (Auto)  3+ /HPF (Negative)   08/19/18  17:59    


 


Ur Transition Epith Cell  2 /HPF  08/19/18  17:59    


 


Urine Mucus  1+ /HPF  08/19/18  17:59    


 


Salicylates  < 0.3 mg/dL (2.8-20.0)  L  08/19/18  17:27    


 


Urine Opiates Screen  Presumptive negative   08/19/18  17:59    


 


Urine Methadone Screen  Presumptive negative   08/19/18  17:59    


 


Acetaminophen  < 5.0 ug/mL (10.0-30.0)  L  08/19/18  17:27    


 


Ur Barbiturates Screen  Presumptive negative   08/19/18  17:59    


 


Ur Phencyclidine Scrn  Presumptive negative   08/19/18  17:59    


 


Ur Amphetamines Screen  Presumptive negative   08/19/18  17:59    


 


U Benzodiazepines Scrn  Presumptive negative   08/19/18  17:59    


 


Urine Cocaine Screen  Presumptive negative   08/19/18  17:59    


 


U Marijuana (THC) Screen  Presumptive negative   08/19/18  17:59    


 


Drugs of Abuse Note  Disclamer   08/19/18  17:59    


 


Plasma/Serum Alcohol  < 0.01 % (0-0.07)   08/19/18  17:27

## 2018-09-04 RX ADMIN — DEXTROSE AND SODIUM CHLORIDE SCH MLS/HR: 5; .45 INJECTION, SOLUTION INTRAVENOUS at 21:53

## 2018-09-04 RX ADMIN — HEPARIN SODIUM SCH UNIT: 5000 INJECTION, SOLUTION INTRAVENOUS; SUBCUTANEOUS at 21:56

## 2018-09-04 RX ADMIN — HEPARIN SODIUM SCH UNIT: 5000 INJECTION, SOLUTION INTRAVENOUS; SUBCUTANEOUS at 11:22

## 2018-09-04 RX ADMIN — DEXTROSE AND SODIUM CHLORIDE SCH MLS/HR: 5; .45 INJECTION, SOLUTION INTRAVENOUS at 21:54

## 2018-09-04 NOTE — PROGRESS NOTE
Assessment and Plan


Assessment and plan: 





Catatonia


- mental health consulted, and signed off





Metabolic encephalopathy


- Supportive care


- EEG showed moderate diffuse encephalopathy


- Showed some improvement


- CT normal finding, MRI showed encephalopathy





UTI


- Treated with IV antibiotics and resolved





Failure to eat


- Continue Dobbhoff tube feeding


Speech reassessed swallowing function to determine the patient's candidacy for 

po intake. Patient was resistant to take the po; however he took a minimal 

amount and demonstrated adequate laryngeal elevation. No evidence of aspiration 

was noted. Nevertheless, in lieu of the patient's ability to swallow pureed, 

patient should maintain an alternative method of nutrition as he will not 

sustain enough to maintain adequate nutritional support.





Suspected femoral fracture


-Evaluated by orthopedics, reviewed his imaging and showed no fracture


-Per families request x-ray of the hip and femur was done and showed no fracture





Nutrition


- patient is on dubhoff


- Patient showed improvement in his mentation


- Speak Therapy recommends placement.  We will attempt to discuss with family.  

Consult GI for further evaluation.





DVT prophylaxis


Disposition


- Patient need placement, 


Dr. Fish discussed the management plan in detail with his families and declined 

to get PEG tube. 


Discharge Planning issues: families declined PEG tube, uninsured.





History


Interval history: 


58-year-old -American male came from mental health facility for altered 

mental status


Catatonia, Psychiatry r/o catatonia, said the side effect of his pysch 

medications


No new issues overnight.





Hospitalist Physical





- Constitutional


Vitals: 


 











Temp Pulse Resp BP Pulse Ox


 


 98.9 F   85   18   105/79   99 


 


 09/04/18 04:48  09/04/18 04:48  09/04/18 04:48  09/04/18 04:48  09/04/18 04:48











General appearance: Present: no acute distress, other (catatonic)





- EENT


Eyes: Present: PERRL, EOM intact


ENT: hearing intact, clear oral mucosa, dentition normal





- Neck


Neck: Present: supple, normal ROM





- Respiratory


Respiratory effort: normal


Respiratory: bilateral: CTA





- Cardiovascular


Rhythm: regular


Heart Sounds: Present: S1 & S2.  Absent: gallop, rub





- Extremities


Extremities: no ischemia, No edema, Full ROM





- Abdominal


General gastrointestinal: soft, non-tender, non-distended, normal bowel sounds





- Integumentary


Integumentary: Present: clear, warm, dry





- Neurologic


Neurologic: CNII-XII intact, moves all extremities





Results





- Labs


CBC & Chem 7: 


 08/25/18 05:43





 08/25/18 05:43


Labs: 


 Laboratory Last Values











WBC  5.9 K/mm3 (4.5-11.0)   08/25/18  05:43    


 


RBC  4.40 M/mm3 (3.65-5.03)   08/25/18  05:43    


 


Hgb  12.4 gm/dl (11.8-15.2)   08/25/18  05:43    


 


Hct  35.1 % (35.5-45.6)  L  08/25/18  05:43    


 


MCV  80 fl (84-94)  L  08/25/18  05:43    


 


MCH  28 pg (28-32)   08/25/18  05:43    


 


MCHC  35 % (32-34)  H  08/25/18  05:43    


 


RDW  14.5 % (13.2-15.2)   08/25/18  05:43    


 


Plt Count  197 K/mm3 (140-440)   08/25/18  05:43    


 


Lymph % (Auto)  15.1 % (13.4-35.0)   08/25/18  05:43    


 


Mono % (Auto)  8.1 % (0.0-7.3)  H  08/25/18  05:43    


 


Eos % (Auto)  2.2 % (0.0-4.3)   08/25/18  05:43    


 


Baso % (Auto)  0.4 % (0.0-1.8)   08/25/18  05:43    


 


Lymph #  0.9 K/mm3 (1.2-5.4)  L  08/25/18  05:43    


 


Mono #  0.5 K/mm3 (0.0-0.8)   08/25/18  05:43    


 


Eos #  0.1 K/mm3 (0.0-0.4)   08/25/18  05:43    


 


Baso #  0.0 K/mm3 (0.0-0.1)   08/25/18  05:43    


 


Total Counted  Cancelled   08/19/18  17:27    


 


Seg Neutrophils %  74.2 % (40.0-70.0)  H  08/25/18  05:43    


 


Seg Neuts % (Manual)  Cancelled   08/19/18  17:27    


 


Band Neutrophils %  Cancelled   08/19/18  17:27    


 


Lymphocytes % (Manual)  Cancelled   08/19/18  17:27    


 


Reactive Lymphs % (Man)  Cancelled   08/19/18  17:27    


 


Monocytes % (Manual)  Cancelled   08/19/18  17:27    


 


Eosinophils % (Manual)  Cancelled   08/19/18  17:27    


 


Basophils % (Manual)  Cancelled   08/19/18  17:27    


 


Metamyelocytes %  Cancelled   08/19/18  17:27    


 


Myelocytes %  Cancelled   08/19/18  17:27    


 


Promyelocytes %  Cancelled   08/19/18  17:27    


 


Blast Cells %  Cancelled   08/19/18  17:27    


 


Nucleated RBC %  Cancelled   08/19/18  17:27    


 


Seg Neutrophils #  4.4 K/mm3 (1.8-7.7)   08/25/18  05:43    


 


Seg Neutrophils # Man  Cancelled   08/19/18  17:27    


 


Band Neutrophils #  Cancelled   08/19/18  17:27    


 


Lymphocytes # (Manual)  Cancelled   08/19/18  17:27    


 


Abs React Lymphs (Man)  Cancelled   08/19/18  17:27    


 


Monocytes # (Manual)  Cancelled   08/19/18  17:27    


 


Eosinophils # (Manual)  Cancelled   08/19/18  17:27    


 


Basophils # (Manual)  Cancelled   08/19/18  17:27    


 


Metamyelocytes #  Cancelled   08/19/18  17:27    


 


Myelocytes #  Cancelled   08/19/18  17:27    


 


Promyelocytes #  Cancelled   08/19/18  17:27    


 


Blast Cells #  Cancelled   08/19/18  17:27    


 


WBC Morphology  Cancelled   08/19/18  17:27    


 


Hypersegmented Neuts  Cancelled   08/19/18  17:27    


 


Hyposegmented Neuts  Cancelled   08/19/18  17:27    


 


Hypogranular Neuts  Cancelled   08/19/18  17:27    


 


Hypersegmented Polys  Cancelled   08/19/18  17:27    


 


Smudge Cells  Cancelled   08/19/18  17:27    


 


Toxic Granulation  Cancelled   08/19/18  17:27    


 


Toxic Vacuolation  Cancelled   08/19/18  17:27    


 


Dohle Bodies  Cancelled   08/19/18  17:27    


 


Pelger-Huet Anomaly  Cancelled   08/19/18  17:27    


 


Phillip Rods  Cancelled   08/19/18  17:27    


 


Platelet Estimate  Cancelled   08/19/18  17:27    


 


Clumped Platelets  Cancelled   08/19/18  17:27    


 


Plt Clumps, EDTA  Cancelled   08/19/18  17:27    


 


Large Platelets  Cancelled   08/19/18  17:27    


 


Giant Platelets  Cancelled   08/19/18  17:27    


 


Platelet Satelliting  Cancelled   08/19/18  17:27    


 


Plt Morphology Comment  Cancelled   08/19/18  17:27    


 


RBC Morphology  Cancelled   08/19/18  17:27    


 


Dimorphic RBCs  Cancelled   08/19/18  17:27    


 


Polychromasia  Cancelled   08/19/18  17:27    


 


Hypochromasia  Cancelled   08/19/18  17:27    


 


Poikilocytosis  Cancelled   08/19/18  17:27    


 


Basophilic Stippling  Cancelled   08/19/18  17:27    


 


Anisocytosis  Cancelled   08/19/18  17:27    


 


Microcytosis  Cancelled   08/19/18  17:27    


 


Macrocytosis  Cancelled   08/19/18  17:27    


 


Spherocytes  Cancelled   08/19/18  17:27    


 


Pappenheimer Bodies  Cancelled   08/19/18  17:27    


 


Sickle Cells  Cancelled   08/19/18  17:27    


 


Target Cells  Cancelled   08/19/18  17:27    


 


Tear Drop Cells  Cancelled   08/19/18  17:27    


 


Ovalocytes  Cancelled   08/19/18  17:27    


 


Stomatocytes  Cancelled   08/19/18  17:27    


 


Helmet Cells  Cancelled   08/19/18  17:27    


 


Burns-Jolly Bodies  Cancelled   08/19/18  17:27    


 


Cabot Rings  Cancelled   08/19/18  17:27    


 


[place] Cells  Cancelled   08/19/18  17:27    


 


Bite Cells  Cancelled   08/19/18  17:27    


 


Crenated Cell  Cancelled   08/19/18  17:27    


 


Elliptocytes  Cancelled   08/19/18  17:27    


 


Acanthocytes (Spur)  Cancelled   08/19/18  17:27    


 


Rouleaux  Cancelled   08/19/18  17:27    


 


Hemoglobin C Crystals  Cancelled   08/19/18  17:27    


 


Schistocytes  Cancelled   08/19/18  17:27    


 


Malaria parasites  Cancelled   08/19/18  17:27    


 


Abhishek Bodies  Cancelled   08/19/18  17:27    


 


Hem Pathologist Commnt  Cancelled   08/19/18  17:27    


 


Sodium  135 mmol/L (137-145)  L  08/25/18  05:43    


 


Potassium  3.7 mmol/L (3.6-5.0)   08/25/18  05:43    


 


Chloride  99.9 mmol/L ()   08/25/18  05:43    


 


Carbon Dioxide  24 mmol/L (22-30)   08/25/18  05:43    


 


Anion Gap  15 mmol/L  08/25/18  05:43    


 


BUN  2 mg/dL (9-20)  L  08/25/18  05:43    


 


Creatinine  0.6 mg/dL (0.8-1.5)  L  08/25/18  05:43    


 


Estimated GFR  > 60 ml/min  08/25/18  05:43    


 


BUN/Creatinine Ratio  3 %  08/25/18  05:43    


 


Glucose  105 mg/dL ()  H  08/25/18  05:43    


 


POC Glucose  102  ()   09/03/18  11:26    


 


Calcium  9.0 mg/dL (8.4-10.2)   08/25/18  05:43    


 


Magnesium  2.20 mg/dL (1.7-2.3)   08/19/18  17:24    


 


Total Bilirubin  1.20 mg/dL (0.1-1.2)   08/19/18  17:27    


 


AST  50 units/L (5-40)  H  08/19/18  17:27    


 


ALT  63 units/L (7-56)  H  08/19/18  17:27    


 


Alkaline Phosphatase  67 units/L ()   08/19/18  17:27    


 


Ammonia  42.0 umol/L (25-60)   08/25/18  15:17    


 


Total Creatine Kinase  148 units/L ()   08/19/18  17:44    


 


CK-MB (CK-2)  1.6 ng/mL (0.0-4.0)   08/19/18  17:44    


 


CK-MB (CK-2) Rel Index  1.0  (0-4)   08/19/18  17:44    


 


Troponin T  < 0.010 ng/mL (0.00-0.029)   08/19/18  17:43    


 


Total Protein  7.2 g/dL (6.3-8.2)   08/19/18  17:27    


 


Albumin  3.6 g/dL (3.9-5)  L  08/19/18  17:27    


 


Albumin/Globulin Ratio  1.0 %  08/19/18  17:27    


 


Vitamin B12  870.0 pg/mL (211-911)   08/25/18  15:13    


 


Folate  6.55 ng/mL (7.3-26.0)  L  08/25/18  15:15    


 


TSH  2.910 mlU/mL (0.270-4.200)   08/25/18  15:16    


 


Free T4  0.92 ng/dL (0.76-1.46)   08/19/18  17:27    


 


Urine Color  Shamika  (Yellow)   08/19/18  17:59    


 


Urine Turbidity  Cloudy  (Clear)   08/19/18  17:59    


 


Urine pH  7.0  (5.0-7.0)   08/19/18  17:59    


 


Ur Specific Gravity  1.021  (1.003-1.030)   08/19/18  17:59    


 


Urine Protein  30 mg/dl mg/dL (Negative)   08/19/18  17:59    


 


Urine Glucose (UA)  Neg mg/dL (Negative)   08/19/18  17:59    


 


Urine Ketones  Neg mg/dL (Negative)   08/19/18  17:59    


 


Urine Blood  Neg  (Negative)   08/19/18  17:59    


 


Urine Nitrite  Neg  (Negative)   08/19/18  17:59    


 


Urine Bilirubin  Neg  (Negative)   08/19/18  17:59    


 


Urine Urobilinogen  4.0 mg/dL (<2.0)   08/19/18  17:59    


 


Ur Leukocyte Esterase  Lg  (Negative)   08/19/18  17:59    


 


Urine WBC (Auto)  92.0 /HPF (0.0-6.0)  H  08/19/18  17:59    


 


Urine RBC (Auto)  6.0 /HPF (0.0-6.0)   08/19/18  17:59    


 


U Epithel Cells (Auto)  < 1.0 /HPF (0-13.0)   08/19/18  17:59    


 


Urine Bacteria (Auto)  3+ /HPF (Negative)   08/19/18  17:59    


 


Ur Transition Epith Cell  2 /HPF  08/19/18  17:59    


 


Urine Mucus  1+ /HPF  08/19/18  17:59    


 


Salicylates  < 0.3 mg/dL (2.8-20.0)  L  08/19/18  17:27    


 


Urine Opiates Screen  Presumptive negative   08/19/18  17:59    


 


Urine Methadone Screen  Presumptive negative   08/19/18  17:59    


 


Acetaminophen  < 5.0 ug/mL (10.0-30.0)  L  08/19/18  17:27    


 


Ur Barbiturates Screen  Presumptive negative   08/19/18  17:59    


 


Ur Phencyclidine Scrn  Presumptive negative   08/19/18  17:59    


 


Ur Amphetamines Screen  Presumptive negative   08/19/18  17:59    


 


U Benzodiazepines Scrn  Presumptive negative   08/19/18  17:59    


 


Urine Cocaine Screen  Presumptive negative   08/19/18  17:59    


 


U Marijuana (THC) Screen  Presumptive negative   08/19/18  17:59    


 


Drugs of Abuse Note  Disclamer   08/19/18  17:59    


 


Plasma/Serum Alcohol  < 0.01 % (0-0.07)   08/19/18  17:27

## 2018-09-05 RX ADMIN — HEPARIN SODIUM SCH UNIT: 5000 INJECTION, SOLUTION INTRAVENOUS; SUBCUTANEOUS at 21:29

## 2018-09-05 RX ADMIN — DEXTROSE AND SODIUM CHLORIDE SCH MLS/HR: 5; .45 INJECTION, SOLUTION INTRAVENOUS at 10:20

## 2018-09-05 RX ADMIN — DEXTROSE AND SODIUM CHLORIDE SCH MLS/HR: 5; .45 INJECTION, SOLUTION INTRAVENOUS at 21:29

## 2018-09-05 RX ADMIN — HEPARIN SODIUM SCH UNIT: 5000 INJECTION, SOLUTION INTRAVENOUS; SUBCUTANEOUS at 10:20

## 2018-09-05 NOTE — PROGRESS NOTE
Assessment and Plan


Assessment and plan: 





Catatonia


- mental health consulted, and signed off





Metabolic encephalopathy


- Supportive care


- EEG showed moderate diffuse encephalopathy


- Showed some improvement


- CT normal finding, MRI showed encephalopathy





UTI


- Treated with IV antibiotics and resolved





Failure to eat


- Continue Dobbhoff tube feeding


Speech reassessed swallowing function to determine the patient's candidacy for 

po intake. Patient was resistant to take the po; however he took a minimal 

amount and demonstrated adequate laryngeal elevation. No evidence of aspiration 

was noted. Nevertheless, in lieu of the patient's ability to swallow pureed, 

patient should maintain an alternative method of nutrition as he will not 

sustain enough to maintain adequate nutritional support.





Suspected femoral fracture


-Evaluated by orthopedics, reviewed his imaging and showed no fracture


-Per families request x-ray of the hip and femur was done and showed no fracture





Nutrition


- patient is on dobhoff


- Patient showing improvement in his mentation


- Speech Therapy recommends placement.  We will attempt to discuss with family.

  Consult GI for further evaluation.





DVT prophylaxis





Disposition


- Patient needs placement, 


Dr. Fish discussed the management plan in detail with his families and declined 

to get PEG tube. 


Discharge Planning issues: families declined PEG tube, uninsured.





History


Interval history: 


58-year-old -American male came from mental health facility for altered 

mental status


Catatonia, Psychiatry r/o catatonia, said the side effect of his pysch 

medications


No new issues overnight.





Hospitalist Physical





- Constitutional


Vitals: 


 











Temp Pulse Resp BP Pulse Ox


 


 98.6 F   76   16   132/86   98 


 


 09/05/18 06:14  09/05/18 06:14  09/05/18 06:14  09/05/18 06:14  09/05/18 06:14











General appearance: Present: no acute distress, other (catatonic)





- EENT


Eyes: Present: PERRL, EOM intact


ENT: hearing intact, clear oral mucosa, dentition normal





- Neck


Neck: Present: supple, normal ROM





- Respiratory


Respiratory effort: normal


Respiratory: bilateral: CTA





- Cardiovascular


Rhythm: regular


Heart Sounds: Present: S1 & S2.  Absent: gallop, rub





- Extremities


Extremities: no ischemia, No edema, Full ROM





- Abdominal


General gastrointestinal: soft, non-tender, non-distended, normal bowel sounds





- Integumentary


Integumentary: Present: clear, warm, dry





- Neurologic


Neurologic: CNII-XII intact, moves all extremities





Results





- Labs


CBC & Chem 7: 


 08/25/18 05:43





 08/25/18 05:43


Labs: 


 Laboratory Last Values











WBC  5.9 K/mm3 (4.5-11.0)   08/25/18  05:43    


 


RBC  4.40 M/mm3 (3.65-5.03)   08/25/18  05:43    


 


Hgb  12.4 gm/dl (11.8-15.2)   08/25/18  05:43    


 


Hct  35.1 % (35.5-45.6)  L  08/25/18  05:43    


 


MCV  80 fl (84-94)  L  08/25/18  05:43    


 


MCH  28 pg (28-32)   08/25/18  05:43    


 


MCHC  35 % (32-34)  H  08/25/18  05:43    


 


RDW  14.5 % (13.2-15.2)   08/25/18  05:43    


 


Plt Count  197 K/mm3 (140-440)   08/25/18  05:43    


 


Lymph % (Auto)  15.1 % (13.4-35.0)   08/25/18  05:43    


 


Mono % (Auto)  8.1 % (0.0-7.3)  H  08/25/18  05:43    


 


Eos % (Auto)  2.2 % (0.0-4.3)   08/25/18  05:43    


 


Baso % (Auto)  0.4 % (0.0-1.8)   08/25/18  05:43    


 


Lymph #  0.9 K/mm3 (1.2-5.4)  L  08/25/18  05:43    


 


Mono #  0.5 K/mm3 (0.0-0.8)   08/25/18  05:43    


 


Eos #  0.1 K/mm3 (0.0-0.4)   08/25/18  05:43    


 


Baso #  0.0 K/mm3 (0.0-0.1)   08/25/18  05:43    


 


Total Counted  Cancelled   08/19/18  17:27    


 


Seg Neutrophils %  74.2 % (40.0-70.0)  H  08/25/18  05:43    


 


Seg Neuts % (Manual)  Cancelled   08/19/18  17:27    


 


Band Neutrophils %  Cancelled   08/19/18  17:27    


 


Lymphocytes % (Manual)  Cancelled   08/19/18  17:27    


 


Reactive Lymphs % (Man)  Cancelled   08/19/18  17:27    


 


Monocytes % (Manual)  Cancelled   08/19/18  17:27    


 


Eosinophils % (Manual)  Cancelled   08/19/18  17:27    


 


Basophils % (Manual)  Cancelled   08/19/18  17:27    


 


Metamyelocytes %  Cancelled   08/19/18  17:27    


 


Myelocytes %  Cancelled   08/19/18  17:27    


 


Promyelocytes %  Cancelled   08/19/18  17:27    


 


Blast Cells %  Cancelled   08/19/18  17:27    


 


Nucleated RBC %  Cancelled   08/19/18  17:27    


 


Seg Neutrophils #  4.4 K/mm3 (1.8-7.7)   08/25/18  05:43    


 


Seg Neutrophils # Man  Cancelled   08/19/18  17:27    


 


Band Neutrophils #  Cancelled   08/19/18  17:27    


 


Lymphocytes # (Manual)  Cancelled   08/19/18  17:27    


 


Abs React Lymphs (Man)  Cancelled   08/19/18  17:27    


 


Monocytes # (Manual)  Cancelled   08/19/18  17:27    


 


Eosinophils # (Manual)  Cancelled   08/19/18  17:27    


 


Basophils # (Manual)  Cancelled   08/19/18  17:27    


 


Metamyelocytes #  Cancelled   08/19/18  17:27    


 


Myelocytes #  Cancelled   08/19/18  17:27    


 


Promyelocytes #  Cancelled   08/19/18  17:27    


 


Blast Cells #  Cancelled   08/19/18  17:27    


 


WBC Morphology  Cancelled   08/19/18  17:27    


 


Hypersegmented Neuts  Cancelled   08/19/18  17:27    


 


Hyposegmented Neuts  Cancelled   08/19/18  17:27    


 


Hypogranular Neuts  Cancelled   08/19/18  17:27    


 


Hypersegmented Polys  Cancelled   08/19/18  17:27    


 


Smudge Cells  Cancelled   08/19/18  17:27    


 


Toxic Granulation  Cancelled   08/19/18  17:27    


 


Toxic Vacuolation  Cancelled   08/19/18  17:27    


 


Dohle Bodies  Cancelled   08/19/18  17:27    


 


Pelger-Huet Anomaly  Cancelled   08/19/18  17:27    


 


Phillip Rods  Cancelled   08/19/18  17:27    


 


Platelet Estimate  Cancelled   08/19/18  17:27    


 


Clumped Platelets  Cancelled   08/19/18  17:27    


 


Plt Clumps, EDTA  Cancelled   08/19/18  17:27    


 


Large Platelets  Cancelled   08/19/18  17:27    


 


Giant Platelets  Cancelled   08/19/18  17:27    


 


Platelet Satelliting  Cancelled   08/19/18  17:27    


 


Plt Morphology Comment  Cancelled   08/19/18  17:27    


 


RBC Morphology  Cancelled   08/19/18  17:27    


 


Dimorphic RBCs  Cancelled   08/19/18  17:27    


 


Polychromasia  Cancelled   08/19/18  17:27    


 


Hypochromasia  Cancelled   08/19/18  17:27    


 


Poikilocytosis  Cancelled   08/19/18  17:27    


 


Basophilic Stippling  Cancelled   08/19/18  17:27    


 


Anisocytosis  Cancelled   08/19/18  17:27    


 


Microcytosis  Cancelled   08/19/18  17:27    


 


Macrocytosis  Cancelled   08/19/18  17:27    


 


Spherocytes  Cancelled   08/19/18  17:27    


 


Pappenheimer Bodies  Cancelled   08/19/18  17:27    


 


Sickle Cells  Cancelled   08/19/18  17:27    


 


Target Cells  Cancelled   08/19/18  17:27    


 


Tear Drop Cells  Cancelled   08/19/18  17:27    


 


Ovalocytes  Cancelled   08/19/18  17:27    


 


Stomatocytes  Cancelled   08/19/18  17:27    


 


Helmet Cells  Cancelled   08/19/18  17:27    


 


Burns-Jolly Bodies  Cancelled   08/19/18  17:27    


 


Cabot Rings  Cancelled   08/19/18  17:27    


 


[place] Cells  Cancelled   08/19/18  17:27    


 


Bite Cells  Cancelled   08/19/18  17:27    


 


Crenated Cell  Cancelled   08/19/18  17:27    


 


Elliptocytes  Cancelled   08/19/18  17:27    


 


Acanthocytes (Spur)  Cancelled   08/19/18  17:27    


 


Rouleaux  Cancelled   08/19/18  17:27    


 


Hemoglobin C Crystals  Cancelled   08/19/18  17:27    


 


Schistocytes  Cancelled   08/19/18  17:27    


 


Malaria parasites  Cancelled   08/19/18  17:27    


 


Abhishek Bodies  Cancelled   08/19/18  17:27    


 


Hem Pathologist Commnt  Cancelled   08/19/18  17:27    


 


Sodium  135 mmol/L (137-145)  L  08/25/18  05:43    


 


Potassium  3.7 mmol/L (3.6-5.0)   08/25/18  05:43    


 


Chloride  99.9 mmol/L ()   08/25/18  05:43    


 


Carbon Dioxide  24 mmol/L (22-30)   08/25/18  05:43    


 


Anion Gap  15 mmol/L  08/25/18  05:43    


 


BUN  2 mg/dL (9-20)  L  08/25/18  05:43    


 


Creatinine  0.6 mg/dL (0.8-1.5)  L  08/25/18  05:43    


 


Estimated GFR  > 60 ml/min  08/25/18  05:43    


 


BUN/Creatinine Ratio  3 %  08/25/18  05:43    


 


Glucose  105 mg/dL ()  H  08/25/18  05:43    


 


POC Glucose  115  ()  H  09/04/18  23:08    


 


Calcium  9.0 mg/dL (8.4-10.2)   08/25/18  05:43    


 


Magnesium  2.20 mg/dL (1.7-2.3)   08/19/18  17:24    


 


Total Bilirubin  1.20 mg/dL (0.1-1.2)   08/19/18  17:27    


 


AST  50 units/L (5-40)  H  08/19/18  17:27    


 


ALT  63 units/L (7-56)  H  08/19/18  17:27    


 


Alkaline Phosphatase  67 units/L ()   08/19/18  17:27    


 


Ammonia  42.0 umol/L (25-60)   08/25/18  15:17    


 


Total Creatine Kinase  148 units/L ()   08/19/18  17:44    


 


CK-MB (CK-2)  1.6 ng/mL (0.0-4.0)   08/19/18  17:44    


 


CK-MB (CK-2) Rel Index  1.0  (0-4)   08/19/18  17:44    


 


Troponin T  < 0.010 ng/mL (0.00-0.029)   08/19/18  17:43    


 


Total Protein  7.2 g/dL (6.3-8.2)   08/19/18  17:27    


 


Albumin  3.6 g/dL (3.9-5)  L  08/19/18  17:27    


 


Albumin/Globulin Ratio  1.0 %  08/19/18  17:27    


 


Vitamin B12  870.0 pg/mL (211-911)   08/25/18  15:13    


 


Folate  6.55 ng/mL (7.3-26.0)  L  08/25/18  15:15    


 


TSH  2.910 mlU/mL (0.270-4.200)   08/25/18  15:16    


 


Free T4  0.92 ng/dL (0.76-1.46)   08/19/18  17:27    


 


Urine Color  Shamika  (Yellow)   08/19/18  17:59    


 


Urine Turbidity  Cloudy  (Clear)   08/19/18  17:59    


 


Urine pH  7.0  (5.0-7.0)   08/19/18  17:59    


 


Ur Specific Gravity  1.021  (1.003-1.030)   08/19/18  17:59    


 


Urine Protein  30 mg/dl mg/dL (Negative)   08/19/18  17:59    


 


Urine Glucose (UA)  Neg mg/dL (Negative)   08/19/18  17:59    


 


Urine Ketones  Neg mg/dL (Negative)   08/19/18  17:59    


 


Urine Blood  Neg  (Negative)   08/19/18  17:59    


 


Urine Nitrite  Neg  (Negative)   08/19/18  17:59    


 


Urine Bilirubin  Neg  (Negative)   08/19/18  17:59    


 


Urine Urobilinogen  4.0 mg/dL (<2.0)   08/19/18  17:59    


 


Ur Leukocyte Esterase  Lg  (Negative)   08/19/18  17:59    


 


Urine WBC (Auto)  92.0 /HPF (0.0-6.0)  H  08/19/18  17:59    


 


Urine RBC (Auto)  6.0 /HPF (0.0-6.0)   08/19/18  17:59    


 


U Epithel Cells (Auto)  < 1.0 /HPF (0-13.0)   08/19/18  17:59    


 


Urine Bacteria (Auto)  3+ /HPF (Negative)   08/19/18  17:59    


 


Ur Transition Epith Cell  2 /HPF  08/19/18  17:59    


 


Urine Mucus  1+ /HPF  08/19/18  17:59    


 


Salicylates  < 0.3 mg/dL (2.8-20.0)  L  08/19/18  17:27    


 


Urine Opiates Screen  Presumptive negative   08/19/18  17:59    


 


Urine Methadone Screen  Presumptive negative   08/19/18  17:59    


 


Acetaminophen  < 5.0 ug/mL (10.0-30.0)  L  08/19/18  17:27    


 


Ur Barbiturates Screen  Presumptive negative   08/19/18  17:59    


 


Ur Phencyclidine Scrn  Presumptive negative   08/19/18  17:59    


 


Ur Amphetamines Screen  Presumptive negative   08/19/18  17:59    


 


U Benzodiazepines Scrn  Presumptive negative   08/19/18  17:59    


 


Urine Cocaine Screen  Presumptive negative   08/19/18  17:59    


 


U Marijuana (THC) Screen  Presumptive negative   08/19/18  17:59    


 


Drugs of Abuse Note  Disclamer   08/19/18  17:59    


 


Plasma/Serum Alcohol  < 0.01 % (0-0.07)   08/19/18  17:27

## 2018-09-05 NOTE — PROGRESS NOTE
Assessment and Plan


Assessment and plan: 





Catatonia


- mental health consulted, and signed off





Metabolic encephalopathy


- Supportive care


- EEG showed moderate diffuse encephalopathy


- Showed some improvement


- CT normal finding, MRI showed encephalopathy





UTI


- Treated with IV antibiotics and resolved





Failure to eat


- Continue Dobbhoff tube feeding


Speech reassessed swallowing function to determine the patient's candidacy for 

po intake. Patient was resistant to take the po; however he took a minimal 

amount and demonstrated adequate laryngeal elevation. No evidence of aspiration 

was noted. Nevertheless, in lieu of the patient's ability to swallow pureed, 

patient should maintain an alternative method of nutrition as he will not 

sustain enough to maintain adequate nutritional support.





Suspected femoral fracture


-Evaluated by orthopedics, reviewed his imaging and showed no fracture


-Per families request x-ray of the hip and femur was done and showed no fracture





Nutrition


- patient is on dobhoff


- Patient showing improvement in his mentation


- Speech Therapy recommends placement.  We will attempt to discuss with family.

  Consult GI for further evaluation.





DVT prophylaxis





Disposition


- Patient needs placement, 


Dr. Fish discussed the management plan in detail with his families and declined 

to get PEG tube. 


Discharge Planning issues: families declined PEG tube, uninsured.





History


Interval history: 


58-year-old -American male came from mental health facility for altered 

mental status


Catatonia, Psychiatry r/o catatonia, said the side effect of his pysch 

medications


No new issues overnight.





Hospitalist Physical





- Constitutional


Vitals: 


 











Temp Pulse Resp BP Pulse Ox


 


 98.6 F   76   16   132/86   98 


 


 09/05/18 06:14  09/05/18 06:14  09/05/18 06:14  09/05/18 06:14  09/05/18 06:14











General appearance: Present: no acute distress, other (catatonic)





Results





- Labs


CBC & Chem 7: 


 08/25/18 05:43





 08/25/18 05:43


Labs: 


 Laboratory Last Values











WBC  5.9 K/mm3 (4.5-11.0)   08/25/18  05:43    


 


RBC  4.40 M/mm3 (3.65-5.03)   08/25/18  05:43    


 


Hgb  12.4 gm/dl (11.8-15.2)   08/25/18  05:43    


 


Hct  35.1 % (35.5-45.6)  L  08/25/18  05:43    


 


MCV  80 fl (84-94)  L  08/25/18  05:43    


 


MCH  28 pg (28-32)   08/25/18  05:43    


 


MCHC  35 % (32-34)  H  08/25/18  05:43    


 


RDW  14.5 % (13.2-15.2)   08/25/18  05:43    


 


Plt Count  197 K/mm3 (140-440)   08/25/18  05:43    


 


Lymph % (Auto)  15.1 % (13.4-35.0)   08/25/18  05:43    


 


Mono % (Auto)  8.1 % (0.0-7.3)  H  08/25/18  05:43    


 


Eos % (Auto)  2.2 % (0.0-4.3)   08/25/18  05:43    


 


Baso % (Auto)  0.4 % (0.0-1.8)   08/25/18  05:43    


 


Lymph #  0.9 K/mm3 (1.2-5.4)  L  08/25/18  05:43    


 


Mono #  0.5 K/mm3 (0.0-0.8)   08/25/18  05:43    


 


Eos #  0.1 K/mm3 (0.0-0.4)   08/25/18  05:43    


 


Baso #  0.0 K/mm3 (0.0-0.1)   08/25/18  05:43    


 


Total Counted  Cancelled   08/19/18  17:27    


 


Seg Neutrophils %  74.2 % (40.0-70.0)  H  08/25/18  05:43    


 


Seg Neuts % (Manual)  Cancelled   08/19/18  17:27    


 


Band Neutrophils %  Cancelled   08/19/18  17:27    


 


Lymphocytes % (Manual)  Cancelled   08/19/18  17:27    


 


Reactive Lymphs % (Man)  Cancelled   08/19/18  17:27    


 


Monocytes % (Manual)  Cancelled   08/19/18  17:27    


 


Eosinophils % (Manual)  Cancelled   08/19/18  17:27    


 


Basophils % (Manual)  Cancelled   08/19/18  17:27    


 


Metamyelocytes %  Cancelled   08/19/18  17:27    


 


Myelocytes %  Cancelled   08/19/18  17:27    


 


Promyelocytes %  Cancelled   08/19/18  17:27    


 


Blast Cells %  Cancelled   08/19/18  17:27    


 


Nucleated RBC %  Cancelled   08/19/18  17:27    


 


Seg Neutrophils #  4.4 K/mm3 (1.8-7.7)   08/25/18  05:43    


 


Seg Neutrophils # Man  Cancelled   08/19/18  17:27    


 


Band Neutrophils #  Cancelled   08/19/18  17:27    


 


Lymphocytes # (Manual)  Cancelled   08/19/18  17:27    


 


Abs React Lymphs (Man)  Cancelled   08/19/18  17:27    


 


Monocytes # (Manual)  Cancelled   08/19/18  17:27    


 


Eosinophils # (Manual)  Cancelled   08/19/18  17:27    


 


Basophils # (Manual)  Cancelled   08/19/18  17:27    


 


Metamyelocytes #  Cancelled   08/19/18  17:27    


 


Myelocytes #  Cancelled   08/19/18  17:27    


 


Promyelocytes #  Cancelled   08/19/18  17:27    


 


Blast Cells #  Cancelled   08/19/18  17:27    


 


WBC Morphology  Cancelled   08/19/18  17:27    


 


Hypersegmented Neuts  Cancelled   08/19/18  17:27    


 


Hyposegmented Neuts  Cancelled   08/19/18  17:27    


 


Hypogranular Neuts  Cancelled   08/19/18  17:27    


 


Hypersegmented Polys  Cancelled   08/19/18  17:27    


 


Smudge Cells  Cancelled   08/19/18  17:27    


 


Toxic Granulation  Cancelled   08/19/18  17:27    


 


Toxic Vacuolation  Cancelled   08/19/18  17:27    


 


Dohle Bodies  Cancelled   08/19/18  17:27    


 


Pelger-Huet Anomaly  Cancelled   08/19/18  17:27    


 


Phillip Rods  Cancelled   08/19/18  17:27    


 


Platelet Estimate  Cancelled   08/19/18  17:27    


 


Clumped Platelets  Cancelled   08/19/18  17:27    


 


Plt Clumps, EDTA  Cancelled   08/19/18  17:27    


 


Large Platelets  Cancelled   08/19/18  17:27    


 


Giant Platelets  Cancelled   08/19/18  17:27    


 


Platelet Satelliting  Cancelled   08/19/18  17:27    


 


Plt Morphology Comment  Cancelled   08/19/18  17:27    


 


RBC Morphology  Cancelled   08/19/18  17:27    


 


Dimorphic RBCs  Cancelled   08/19/18  17:27    


 


Polychromasia  Cancelled   08/19/18  17:27    


 


Hypochromasia  Cancelled   08/19/18  17:27    


 


Poikilocytosis  Cancelled   08/19/18  17:27    


 


Basophilic Stippling  Cancelled   08/19/18  17:27    


 


Anisocytosis  Cancelled   08/19/18  17:27    


 


Microcytosis  Cancelled   08/19/18  17:27    


 


Macrocytosis  Cancelled   08/19/18  17:27    


 


Spherocytes  Cancelled   08/19/18  17:27    


 


Pappenheimer Bodies  Cancelled   08/19/18  17:27    


 


Sickle Cells  Cancelled   08/19/18  17:27    


 


Target Cells  Cancelled   08/19/18  17:27    


 


Tear Drop Cells  Cancelled   08/19/18  17:27    


 


Ovalocytes  Cancelled   08/19/18  17:27    


 


Stomatocytes  Cancelled   08/19/18  17:27    


 


Helmet Cells  Cancelled   08/19/18  17:27    


 


Burns-Jolly Bodies  Cancelled   08/19/18  17:27    


 


Cabot Rings  Cancelled   08/19/18  17:27    


 


[place] Cells  Cancelled   08/19/18  17:27    


 


Bite Cells  Cancelled   08/19/18  17:27    


 


Crenated Cell  Cancelled   08/19/18  17:27    


 


Elliptocytes  Cancelled   08/19/18  17:27    


 


Acanthocytes (Spur)  Cancelled   08/19/18  17:27    


 


Rouleaux  Cancelled   08/19/18  17:27    


 


Hemoglobin C Crystals  Cancelled   08/19/18  17:27    


 


Schistocytes  Cancelled   08/19/18  17:27    


 


Malaria parasites  Cancelled   08/19/18  17:27    


 


Abhishek Bodies  Cancelled   08/19/18  17:27    


 


Hem Pathologist Commnt  Cancelled   08/19/18  17:27    


 


Sodium  135 mmol/L (137-145)  L  08/25/18  05:43    


 


Potassium  3.7 mmol/L (3.6-5.0)   08/25/18  05:43    


 


Chloride  99.9 mmol/L ()   08/25/18  05:43    


 


Carbon Dioxide  24 mmol/L (22-30)   08/25/18  05:43    


 


Anion Gap  15 mmol/L  08/25/18  05:43    


 


BUN  2 mg/dL (9-20)  L  08/25/18  05:43    


 


Creatinine  0.6 mg/dL (0.8-1.5)  L  08/25/18  05:43    


 


Estimated GFR  > 60 ml/min  08/25/18  05:43    


 


BUN/Creatinine Ratio  3 %  08/25/18  05:43    


 


Glucose  105 mg/dL ()  H  08/25/18  05:43    


 


POC Glucose  115  ()  H  09/04/18  23:08    


 


Calcium  9.0 mg/dL (8.4-10.2)   08/25/18  05:43    


 


Magnesium  2.20 mg/dL (1.7-2.3)   08/19/18  17:24    


 


Total Bilirubin  1.20 mg/dL (0.1-1.2)   08/19/18  17:27    


 


AST  50 units/L (5-40)  H  08/19/18  17:27    


 


ALT  63 units/L (7-56)  H  08/19/18  17:27    


 


Alkaline Phosphatase  67 units/L ()   08/19/18  17:27    


 


Ammonia  42.0 umol/L (25-60)   08/25/18  15:17    


 


Total Creatine Kinase  148 units/L ()   08/19/18  17:44    


 


CK-MB (CK-2)  1.6 ng/mL (0.0-4.0)   08/19/18  17:44    


 


CK-MB (CK-2) Rel Index  1.0  (0-4)   08/19/18  17:44    


 


Troponin T  < 0.010 ng/mL (0.00-0.029)   08/19/18  17:43    


 


Total Protein  7.2 g/dL (6.3-8.2)   08/19/18  17:27    


 


Albumin  3.6 g/dL (3.9-5)  L  08/19/18  17:27    


 


Albumin/Globulin Ratio  1.0 %  08/19/18  17:27    


 


Vitamin B12  870.0 pg/mL (211-911)   08/25/18  15:13    


 


Folate  6.55 ng/mL (7.3-26.0)  L  08/25/18  15:15    


 


TSH  2.910 mlU/mL (0.270-4.200)   08/25/18  15:16    


 


Free T4  0.92 ng/dL (0.76-1.46)   08/19/18  17:27    


 


Urine Color  Shamika  (Yellow)   08/19/18  17:59    


 


Urine Turbidity  Cloudy  (Clear)   08/19/18  17:59    


 


Urine pH  7.0  (5.0-7.0)   08/19/18  17:59    


 


Ur Specific Gravity  1.021  (1.003-1.030)   08/19/18  17:59    


 


Urine Protein  30 mg/dl mg/dL (Negative)   08/19/18  17:59    


 


Urine Glucose (UA)  Neg mg/dL (Negative)   08/19/18  17:59    


 


Urine Ketones  Neg mg/dL (Negative)   08/19/18  17:59    


 


Urine Blood  Neg  (Negative)   08/19/18  17:59    


 


Urine Nitrite  Neg  (Negative)   08/19/18  17:59    


 


Urine Bilirubin  Neg  (Negative)   08/19/18  17:59    


 


Urine Urobilinogen  4.0 mg/dL (<2.0)   08/19/18  17:59    


 


Ur Leukocyte Esterase  Lg  (Negative)   08/19/18  17:59    


 


Urine WBC (Auto)  92.0 /HPF (0.0-6.0)  H  08/19/18  17:59    


 


Urine RBC (Auto)  6.0 /HPF (0.0-6.0)   08/19/18  17:59    


 


U Epithel Cells (Auto)  < 1.0 /HPF (0-13.0)   08/19/18  17:59    


 


Urine Bacteria (Auto)  3+ /HPF (Negative)   08/19/18  17:59    


 


Ur Transition Epith Cell  2 /HPF  08/19/18  17:59    


 


Urine Mucus  1+ /HPF  08/19/18  17:59    


 


Salicylates  < 0.3 mg/dL (2.8-20.0)  L  08/19/18  17:27    


 


Urine Opiates Screen  Presumptive negative   08/19/18  17:59    


 


Urine Methadone Screen  Presumptive negative   08/19/18  17:59    


 


Acetaminophen  < 5.0 ug/mL (10.0-30.0)  L  08/19/18  17:27    


 


Ur Barbiturates Screen  Presumptive negative   08/19/18  17:59    


 


Ur Phencyclidine Scrn  Presumptive negative   08/19/18  17:59    


 


Ur Amphetamines Screen  Presumptive negative   08/19/18  17:59    


 


U Benzodiazepines Scrn  Presumptive negative   08/19/18  17:59    


 


Urine Cocaine Screen  Presumptive negative   08/19/18  17:59    


 


U Marijuana (THC) Screen  Presumptive negative   08/19/18  17:59    


 


Drugs of Abuse Note  Disclamer   08/19/18  17:59    


 


Plasma/Serum Alcohol  < 0.01 % (0-0.07)   08/19/18  17:27

## 2018-09-06 RX ADMIN — HEPARIN SODIUM SCH UNIT: 5000 INJECTION, SOLUTION INTRAVENOUS; SUBCUTANEOUS at 10:17

## 2018-09-06 RX ADMIN — HEPARIN SODIUM SCH UNIT: 5000 INJECTION, SOLUTION INTRAVENOUS; SUBCUTANEOUS at 21:54

## 2018-09-06 RX ADMIN — DEXTROSE AND SODIUM CHLORIDE SCH MLS/HR: 5; .45 INJECTION, SOLUTION INTRAVENOUS at 19:59

## 2018-09-06 RX ADMIN — DEXTROSE AND SODIUM CHLORIDE SCH MLS/HR: 5; .45 INJECTION, SOLUTION INTRAVENOUS at 07:35

## 2018-09-06 NOTE — PROGRESS NOTE
Assessment and Plan


Assessment and plan: 





Catatonia


- mental health consulted, and signed off





Metabolic encephalopathy


- Supportive care


- EEG showed moderate diffuse encephalopathy


- Showed some improvement


- CT normal finding, MRI showed encephalopathy





UTI


- Treated with IV antibiotics and resolved





Failure to eat


- Continue Dobbhoff tube feeding


Speech reassessed swallowing function to determine the patient's candidacy for 

po intake. Patient was recently resistant to take the po; however he took a 

minimal amount and demonstrated adequate laryngeal elevation. No evidence of 

aspiration was noted. Nevertheless, in lieu of the patient's ability to swallow 

pureed, it was previously thought that he should maintain an alternative method 

of nutrition as he will not sustain enough to maintain adequate nutritional 

support.  Unfortunately, patient put out Dobbhoff tube yesterday.  Also, nurse 

reports that he was taking by mouth fairly well afterwards.  Therefore, we will 

leave Dobbhoff tube out for now and have nutrition reassess.





Suspected femoral fracture


-Evaluated by orthopedics, reviewed his imaging and showed no fracture


-Per families request x-ray of the hip and femur was done and showed no fracture





Nutrition


- patient is on dobhoff


- Patient showing improvement in his mentation


- Speech Therapy recommends placement.  We will attempt to discuss with family.

  Consult GI for further evaluation.





DVT prophylaxis





Disposition


- Patient needs placement, 


Dr. Monge discussed the management plan in detail with his families and 

declined to get PEG tube. 


Discharge Planning issues: families declined PEG tube, uninsured.





History


Interval history: 


58-year-old -American male came from mental health facility for altered 

mental status


Catatonia, Psychiatry r/o catatonia, said the side effect of his pysch 

medications.  Patient pulled out Dobbhoff tube yesterday


No new issues overnight.





Hospitalist Physical





- Constitutional


Vitals: 


 











Temp Pulse Resp BP Pulse Ox


 


 98.3 F   75   18   102/64   97 


 


 09/06/18 05:58  09/06/18 05:58  09/06/18 05:58  09/06/18 05:58  09/06/18 05:58











General appearance: Present: no acute distress, other (catatonic)





- EENT


Eyes: Present: PERRL, EOM intact


ENT: hearing intact, clear oral mucosa, dentition normal





- Neck


Neck: Present: supple, normal ROM





- Respiratory


Respiratory effort: normal


Respiratory: bilateral: CTA





- Cardiovascular


Rhythm: regular


Heart Sounds: Present: S1 & S2.  Absent: gallop, rub





- Extremities


Extremities: no ischemia, No edema, Full ROM





- Abdominal


General gastrointestinal: soft, non-tender, non-distended, normal bowel sounds





- Integumentary


Integumentary: Present: clear, warm, dry





- Neurologic


Neurologic: CNII-XII intact, moves all extremities





Results





- Labs


CBC & Chem 7: 


 08/25/18 05:43





 08/25/18 05:43


Labs: 


 Laboratory Last Values











WBC  5.9 K/mm3 (4.5-11.0)   08/25/18  05:43    


 


RBC  4.40 M/mm3 (3.65-5.03)   08/25/18  05:43    


 


Hgb  12.4 gm/dl (11.8-15.2)   08/25/18  05:43    


 


Hct  35.1 % (35.5-45.6)  L  08/25/18  05:43    


 


MCV  80 fl (84-94)  L  08/25/18  05:43    


 


MCH  28 pg (28-32)   08/25/18  05:43    


 


MCHC  35 % (32-34)  H  08/25/18  05:43    


 


RDW  14.5 % (13.2-15.2)   08/25/18  05:43    


 


Plt Count  197 K/mm3 (140-440)   08/25/18  05:43    


 


Lymph % (Auto)  15.1 % (13.4-35.0)   08/25/18  05:43    


 


Mono % (Auto)  8.1 % (0.0-7.3)  H  08/25/18  05:43    


 


Eos % (Auto)  2.2 % (0.0-4.3)   08/25/18  05:43    


 


Baso % (Auto)  0.4 % (0.0-1.8)   08/25/18  05:43    


 


Lymph #  0.9 K/mm3 (1.2-5.4)  L  08/25/18  05:43    


 


Mono #  0.5 K/mm3 (0.0-0.8)   08/25/18  05:43    


 


Eos #  0.1 K/mm3 (0.0-0.4)   08/25/18  05:43    


 


Baso #  0.0 K/mm3 (0.0-0.1)   08/25/18  05:43    


 


Total Counted  Cancelled   08/19/18  17:27    


 


Seg Neutrophils %  74.2 % (40.0-70.0)  H  08/25/18  05:43    


 


Seg Neuts % (Manual)  Cancelled   08/19/18  17:27    


 


Band Neutrophils %  Cancelled   08/19/18  17:27    


 


Lymphocytes % (Manual)  Cancelled   08/19/18  17:27    


 


Reactive Lymphs % (Man)  Cancelled   08/19/18  17:27    


 


Monocytes % (Manual)  Cancelled   08/19/18  17:27    


 


Eosinophils % (Manual)  Cancelled   08/19/18  17:27    


 


Basophils % (Manual)  Cancelled   08/19/18  17:27    


 


Metamyelocytes %  Cancelled   08/19/18  17:27    


 


Myelocytes %  Cancelled   08/19/18  17:27    


 


Promyelocytes %  Cancelled   08/19/18  17:27    


 


Blast Cells %  Cancelled   08/19/18  17:27    


 


Nucleated RBC %  Cancelled   08/19/18  17:27    


 


Seg Neutrophils #  4.4 K/mm3 (1.8-7.7)   08/25/18  05:43    


 


Seg Neutrophils # Man  Cancelled   08/19/18  17:27    


 


Band Neutrophils #  Cancelled   08/19/18  17:27    


 


Lymphocytes # (Manual)  Cancelled   08/19/18  17:27    


 


Abs React Lymphs (Man)  Cancelled   08/19/18  17:27    


 


Monocytes # (Manual)  Cancelled   08/19/18  17:27    


 


Eosinophils # (Manual)  Cancelled   08/19/18  17:27    


 


Basophils # (Manual)  Cancelled   08/19/18  17:27    


 


Metamyelocytes #  Cancelled   08/19/18  17:27    


 


Myelocytes #  Cancelled   08/19/18  17:27    


 


Promyelocytes #  Cancelled   08/19/18  17:27    


 


Blast Cells #  Cancelled   08/19/18  17:27    


 


WBC Morphology  Cancelled   08/19/18  17:27    


 


Hypersegmented Neuts  Cancelled   08/19/18  17:27    


 


Hyposegmented Neuts  Cancelled   08/19/18  17:27    


 


Hypogranular Neuts  Cancelled   08/19/18  17:27    


 


Hypersegmented Polys  Cancelled   08/19/18  17:27    


 


Smudge Cells  Cancelled   08/19/18  17:27    


 


Toxic Granulation  Cancelled   08/19/18  17:27    


 


Toxic Vacuolation  Cancelled   08/19/18  17:27    


 


Dohle Bodies  Cancelled   08/19/18  17:27    


 


Pelger-Huet Anomaly  Cancelled   08/19/18  17:27    


 


Phillip Rods  Cancelled   08/19/18  17:27    


 


Platelet Estimate  Cancelled   08/19/18  17:27    


 


Clumped Platelets  Cancelled   08/19/18  17:27    


 


Plt Clumps, EDTA  Cancelled   08/19/18  17:27    


 


Large Platelets  Cancelled   08/19/18  17:27    


 


Giant Platelets  Cancelled   08/19/18  17:27    


 


Platelet Satelliting  Cancelled   08/19/18  17:27    


 


Plt Morphology Comment  Cancelled   08/19/18  17:27    


 


RBC Morphology  Cancelled   08/19/18  17:27    


 


Dimorphic RBCs  Cancelled   08/19/18  17:27    


 


Polychromasia  Cancelled   08/19/18  17:27    


 


Hypochromasia  Cancelled   08/19/18  17:27    


 


Poikilocytosis  Cancelled   08/19/18  17:27    


 


Basophilic Stippling  Cancelled   08/19/18  17:27    


 


Anisocytosis  Cancelled   08/19/18  17:27    


 


Microcytosis  Cancelled   08/19/18  17:27    


 


Macrocytosis  Cancelled   08/19/18  17:27    


 


Spherocytes  Cancelled   08/19/18  17:27    


 


Pappenheimer Bodies  Cancelled   08/19/18  17:27    


 


Sickle Cells  Cancelled   08/19/18  17:27    


 


Target Cells  Cancelled   08/19/18  17:27    


 


Tear Drop Cells  Cancelled   08/19/18  17:27    


 


Ovalocytes  Cancelled   08/19/18  17:27    


 


Stomatocytes  Cancelled   08/19/18  17:27    


 


Helmet Cells  Cancelled   08/19/18  17:27    


 


Burns-Jolly Bodies  Cancelled   08/19/18  17:27    


 


Cabot Rings  Cancelled   08/19/18  17:27    


 


[place] Cells  Cancelled   08/19/18  17:27    


 


Bite Cells  Cancelled   08/19/18  17:27    


 


Crenated Cell  Cancelled   08/19/18  17:27    


 


Elliptocytes  Cancelled   08/19/18  17:27    


 


Acanthocytes (Spur)  Cancelled   08/19/18  17:27    


 


Rouleaux  Cancelled   08/19/18  17:27    


 


Hemoglobin C Crystals  Cancelled   08/19/18  17:27    


 


Schistocytes  Cancelled   08/19/18  17:27    


 


Malaria parasites  Cancelled   08/19/18  17:27    


 


Abhishek Bodies  Cancelled   08/19/18  17:27    


 


Hem Pathologist Commnt  Cancelled   08/19/18  17:27    


 


Sodium  135 mmol/L (137-145)  L  08/25/18  05:43    


 


Potassium  3.7 mmol/L (3.6-5.0)   08/25/18  05:43    


 


Chloride  99.9 mmol/L ()   08/25/18  05:43    


 


Carbon Dioxide  24 mmol/L (22-30)   08/25/18  05:43    


 


Anion Gap  15 mmol/L  08/25/18  05:43    


 


BUN  2 mg/dL (9-20)  L  08/25/18  05:43    


 


Creatinine  0.6 mg/dL (0.8-1.5)  L  08/25/18  05:43    


 


Estimated GFR  > 60 ml/min  08/25/18  05:43    


 


BUN/Creatinine Ratio  3 %  08/25/18  05:43    


 


Glucose  105 mg/dL ()  H  08/25/18  05:43    


 


POC Glucose  115  ()  H  09/04/18  23:08    


 


Calcium  9.0 mg/dL (8.4-10.2)   08/25/18  05:43    


 


Magnesium  2.20 mg/dL (1.7-2.3)   08/19/18  17:24    


 


Total Bilirubin  1.20 mg/dL (0.1-1.2)   08/19/18  17:27    


 


AST  50 units/L (5-40)  H  08/19/18  17:27    


 


ALT  63 units/L (7-56)  H  08/19/18  17:27    


 


Alkaline Phosphatase  67 units/L ()   08/19/18  17:27    


 


Ammonia  42.0 umol/L (25-60)   08/25/18  15:17    


 


Total Creatine Kinase  148 units/L ()   08/19/18  17:44    


 


CK-MB (CK-2)  1.6 ng/mL (0.0-4.0)   08/19/18  17:44    


 


CK-MB (CK-2) Rel Index  1.0  (0-4)   08/19/18  17:44    


 


Troponin T  < 0.010 ng/mL (0.00-0.029)   08/19/18  17:43    


 


Total Protein  7.2 g/dL (6.3-8.2)   08/19/18  17:27    


 


Albumin  3.6 g/dL (3.9-5)  L  08/19/18  17:27    


 


Albumin/Globulin Ratio  1.0 %  08/19/18  17:27    


 


Vitamin B12  870.0 pg/mL (211-911)   08/25/18  15:13    


 


Folate  6.55 ng/mL (7.3-26.0)  L  08/25/18  15:15    


 


TSH  2.910 mlU/mL (0.270-4.200)   08/25/18  15:16    


 


Free T4  0.92 ng/dL (0.76-1.46)   08/19/18  17:27    


 


Urine Color  Shamika  (Yellow)   08/19/18  17:59    


 


Urine Turbidity  Cloudy  (Clear)   08/19/18  17:59    


 


Urine pH  7.0  (5.0-7.0)   08/19/18  17:59    


 


Ur Specific Gravity  1.021  (1.003-1.030)   08/19/18  17:59    


 


Urine Protein  30 mg/dl mg/dL (Negative)   08/19/18  17:59    


 


Urine Glucose (UA)  Neg mg/dL (Negative)   08/19/18  17:59    


 


Urine Ketones  Neg mg/dL (Negative)   08/19/18  17:59    


 


Urine Blood  Neg  (Negative)   08/19/18  17:59    


 


Urine Nitrite  Neg  (Negative)   08/19/18  17:59    


 


Urine Bilirubin  Neg  (Negative)   08/19/18  17:59    


 


Urine Urobilinogen  4.0 mg/dL (<2.0)   08/19/18  17:59    


 


Ur Leukocyte Esterase  Lg  (Negative)   08/19/18  17:59    


 


Urine WBC (Auto)  92.0 /HPF (0.0-6.0)  H  08/19/18  17:59    


 


Urine RBC (Auto)  6.0 /HPF (0.0-6.0)   08/19/18  17:59    


 


U Epithel Cells (Auto)  < 1.0 /HPF (0-13.0)   08/19/18  17:59    


 


Urine Bacteria (Auto)  3+ /HPF (Negative)   08/19/18  17:59    


 


Ur Transition Epith Cell  2 /HPF  08/19/18  17:59    


 


Urine Mucus  1+ /HPF  08/19/18  17:59    


 


Salicylates  < 0.3 mg/dL (2.8-20.0)  L  08/19/18  17:27    


 


Urine Opiates Screen  Presumptive negative   08/19/18  17:59    


 


Urine Methadone Screen  Presumptive negative   08/19/18  17:59    


 


Acetaminophen  < 5.0 ug/mL (10.0-30.0)  L  08/19/18  17:27    


 


Ur Barbiturates Screen  Presumptive negative   08/19/18  17:59    


 


Ur Phencyclidine Scrn  Presumptive negative   08/19/18  17:59    


 


Ur Amphetamines Screen  Presumptive negative   08/19/18  17:59    


 


U Benzodiazepines Scrn  Presumptive negative   08/19/18  17:59    


 


Urine Cocaine Screen  Presumptive negative   08/19/18  17:59    


 


U Marijuana (THC) Screen  Presumptive negative   08/19/18  17:59    


 


Drugs of Abuse Note  Disclamer   08/19/18  17:59    


 


Plasma/Serum Alcohol  < 0.01 % (0-0.07)   08/19/18  17:27

## 2018-09-07 RX ADMIN — HEPARIN SODIUM SCH UNIT: 5000 INJECTION, SOLUTION INTRAVENOUS; SUBCUTANEOUS at 14:36

## 2018-09-07 RX ADMIN — HEPARIN SODIUM SCH UNIT: 5000 INJECTION, SOLUTION INTRAVENOUS; SUBCUTANEOUS at 21:51

## 2018-09-07 NOTE — PROGRESS NOTE
Assessment and Plan


Assessment and plan: 





Catatonia


- Improved.  Mental health consulted, and signed off





Metabolic encephalopathy


- Supportive care


- EEG showed moderate diffuse encephalopathy


- Showed some improvement


- CT normal finding, MRI showed encephalopathy





UTI


- Treated with IV antibiotics and resolved





Protein calorie malnutrition


- We will need to resume Dobbhoff tube feeding


Speech reassessed swallowing and No evidence of aspiration was noted.  Patient 

will need an alternative method of nutrition as he will not sustain enough to 

maintain adequate nutritional support. 


 


Suspected femoral fracture


-Evaluated by orthopedics, reviewed his imaging and showed no fracture


-Per families request x-ray of the hip and femur was done and showed no fracture





DVT prophylaxis





Disposition


- Patient needs placement, 


Dr. Monge discussed the management plan in detail with his families and 

declined to get PEG tube. 


Discharge Planning issues: families declined PEG tube, uninsured.





History


Interval history: 


58-year-old -American male came from mental health facility for altered 

mental status


Catatonia, Psychiatry r/o catatonia, said the side effect of his pysch 

medications.  Patient pulled out Dobbhoff tube yesterday


No new issues overnight.





Hospitalist Physical





- Constitutional


Vitals: 


 











Temp Pulse Resp BP Pulse Ox


 


 97.8 F   86   20   115/76   99 


 


 09/07/18 06:26  09/07/18 06:26  09/07/18 06:26  09/07/18 06:26  09/07/18 06:26











General appearance: Present: no acute distress, other (catatonic)





- EENT


Eyes: Present: PERRL, EOM intact


ENT: hearing intact, clear oral mucosa, dentition normal





- Neck


Neck: Present: supple, normal ROM





- Respiratory


Respiratory effort: normal


Respiratory: bilateral: CTA





- Cardiovascular


Rhythm: regular


Heart Sounds: Present: S1 & S2.  Absent: gallop, rub





- Extremities


Extremities: no ischemia, No edema, Full ROM





- Abdominal


General gastrointestinal: soft, non-tender, non-distended, normal bowel sounds





- Integumentary


Integumentary: Present: clear, warm, dry





- Neurologic


Neurologic: CNII-XII intact, moves all extremities





Results





- Labs


CBC & Chem 7: 


 08/25/18 05:43





 08/25/18 05:43


Labs: 


 Laboratory Last Values











WBC  5.9 K/mm3 (4.5-11.0)   08/25/18  05:43    


 


RBC  4.40 M/mm3 (3.65-5.03)   08/25/18  05:43    


 


Hgb  12.4 gm/dl (11.8-15.2)   08/25/18  05:43    


 


Hct  35.1 % (35.5-45.6)  L  08/25/18  05:43    


 


MCV  80 fl (84-94)  L  08/25/18  05:43    


 


MCH  28 pg (28-32)   08/25/18  05:43    


 


MCHC  35 % (32-34)  H  08/25/18  05:43    


 


RDW  14.5 % (13.2-15.2)   08/25/18  05:43    


 


Plt Count  197 K/mm3 (140-440)   08/25/18  05:43    


 


Lymph % (Auto)  15.1 % (13.4-35.0)   08/25/18  05:43    


 


Mono % (Auto)  8.1 % (0.0-7.3)  H  08/25/18  05:43    


 


Eos % (Auto)  2.2 % (0.0-4.3)   08/25/18  05:43    


 


Baso % (Auto)  0.4 % (0.0-1.8)   08/25/18  05:43    


 


Lymph #  0.9 K/mm3 (1.2-5.4)  L  08/25/18  05:43    


 


Mono #  0.5 K/mm3 (0.0-0.8)   08/25/18  05:43    


 


Eos #  0.1 K/mm3 (0.0-0.4)   08/25/18  05:43    


 


Baso #  0.0 K/mm3 (0.0-0.1)   08/25/18  05:43    


 


Total Counted  Cancelled   08/19/18  17:27    


 


Seg Neutrophils %  74.2 % (40.0-70.0)  H  08/25/18  05:43    


 


Seg Neuts % (Manual)  Cancelled   08/19/18  17:27    


 


Band Neutrophils %  Cancelled   08/19/18  17:27    


 


Lymphocytes % (Manual)  Cancelled   08/19/18  17:27    


 


Reactive Lymphs % (Man)  Cancelled   08/19/18  17:27    


 


Monocytes % (Manual)  Cancelled   08/19/18  17:27    


 


Eosinophils % (Manual)  Cancelled   08/19/18  17:27    


 


Basophils % (Manual)  Cancelled   08/19/18  17:27    


 


Metamyelocytes %  Cancelled   08/19/18  17:27    


 


Myelocytes %  Cancelled   08/19/18  17:27    


 


Promyelocytes %  Cancelled   08/19/18  17:27    


 


Blast Cells %  Cancelled   08/19/18  17:27    


 


Nucleated RBC %  Cancelled   08/19/18  17:27    


 


Seg Neutrophils #  4.4 K/mm3 (1.8-7.7)   08/25/18  05:43    


 


Seg Neutrophils # Man  Cancelled   08/19/18  17:27    


 


Band Neutrophils #  Cancelled   08/19/18  17:27    


 


Lymphocytes # (Manual)  Cancelled   08/19/18  17:27    


 


Abs React Lymphs (Man)  Cancelled   08/19/18  17:27    


 


Monocytes # (Manual)  Cancelled   08/19/18  17:27    


 


Eosinophils # (Manual)  Cancelled   08/19/18  17:27    


 


Basophils # (Manual)  Cancelled   08/19/18  17:27    


 


Metamyelocytes #  Cancelled   08/19/18  17:27    


 


Myelocytes #  Cancelled   08/19/18  17:27    


 


Promyelocytes #  Cancelled   08/19/18  17:27    


 


Blast Cells #  Cancelled   08/19/18  17:27    


 


WBC Morphology  Cancelled   08/19/18  17:27    


 


Hypersegmented Neuts  Cancelled   08/19/18  17:27    


 


Hyposegmented Neuts  Cancelled   08/19/18  17:27    


 


Hypogranular Neuts  Cancelled   08/19/18  17:27    


 


Hypersegmented Polys  Cancelled   08/19/18  17:27    


 


Smudge Cells  Cancelled   08/19/18  17:27    


 


Toxic Granulation  Cancelled   08/19/18  17:27    


 


Toxic Vacuolation  Cancelled   08/19/18  17:27    


 


Dohle Bodies  Cancelled   08/19/18  17:27    


 


Pelger-Huet Anomaly  Cancelled   08/19/18  17:27    


 


Phillip Rods  Cancelled   08/19/18  17:27    


 


Platelet Estimate  Cancelled   08/19/18  17:27    


 


Clumped Platelets  Cancelled   08/19/18  17:27    


 


Plt Clumps, EDTA  Cancelled   08/19/18  17:27    


 


Large Platelets  Cancelled   08/19/18  17:27    


 


Giant Platelets  Cancelled   08/19/18  17:27    


 


Platelet Satelliting  Cancelled   08/19/18  17:27    


 


Plt Morphology Comment  Cancelled   08/19/18  17:27    


 


RBC Morphology  Cancelled   08/19/18  17:27    


 


Dimorphic RBCs  Cancelled   08/19/18  17:27    


 


Polychromasia  Cancelled   08/19/18  17:27    


 


Hypochromasia  Cancelled   08/19/18  17:27    


 


Poikilocytosis  Cancelled   08/19/18  17:27    


 


Basophilic Stippling  Cancelled   08/19/18  17:27    


 


Anisocytosis  Cancelled   08/19/18  17:27    


 


Microcytosis  Cancelled   08/19/18  17:27    


 


Macrocytosis  Cancelled   08/19/18  17:27    


 


Spherocytes  Cancelled   08/19/18  17:27    


 


Pappenheimer Bodies  Cancelled   08/19/18  17:27    


 


Sickle Cells  Cancelled   08/19/18  17:27    


 


Target Cells  Cancelled   08/19/18  17:27    


 


Tear Drop Cells  Cancelled   08/19/18  17:27    


 


Ovalocytes  Cancelled   08/19/18  17:27    


 


Stomatocytes  Cancelled   08/19/18  17:27    


 


Helmet Cells  Cancelled   08/19/18  17:27    


 


Burns-Jolly Bodies  Cancelled   08/19/18  17:27    


 


Cabot Rings  Cancelled   08/19/18  17:27    


 


Beaufort Cells  Cancelled   08/19/18  17:27    


 


Bite Cells  Cancelled   08/19/18  17:27    


 


Crenated Cell  Cancelled   08/19/18  17:27    


 


Elliptocytes  Cancelled   08/19/18  17:27    


 


Acanthocytes (Spur)  Cancelled   08/19/18  17:27    


 


Rouleaux  Cancelled   08/19/18  17:27    


 


Hemoglobin C Crystals  Cancelled   08/19/18  17:27    


 


Schistocytes  Cancelled   08/19/18  17:27    


 


Malaria parasites  Cancelled   08/19/18  17:27    


 


Abhishek Bodies  Cancelled   08/19/18  17:27    


 


Hem Pathologist Commnt  Cancelled   08/19/18  17:27    


 


Sodium  135 mmol/L (137-145)  L  08/25/18  05:43    


 


Potassium  3.7 mmol/L (3.6-5.0)   08/25/18  05:43    


 


Chloride  99.9 mmol/L ()   08/25/18  05:43    


 


Carbon Dioxide  24 mmol/L (22-30)   08/25/18  05:43    


 


Anion Gap  15 mmol/L  08/25/18  05:43    


 


BUN  2 mg/dL (9-20)  L  08/25/18  05:43    


 


Creatinine  0.6 mg/dL (0.8-1.5)  L  08/25/18  05:43    


 


Estimated GFR  > 60 ml/min  08/25/18  05:43    


 


BUN/Creatinine Ratio  3 %  08/25/18  05:43    


 


Glucose  105 mg/dL ()  H  08/25/18  05:43    


 


POC Glucose  115  ()  H  09/04/18  23:08    


 


Calcium  9.0 mg/dL (8.4-10.2)   08/25/18  05:43    


 


Magnesium  2.20 mg/dL (1.7-2.3)   08/19/18  17:24    


 


Total Bilirubin  1.20 mg/dL (0.1-1.2)   08/19/18  17:27    


 


AST  50 units/L (5-40)  H  08/19/18  17:27    


 


ALT  63 units/L (7-56)  H  08/19/18  17:27    


 


Alkaline Phosphatase  67 units/L ()   08/19/18  17:27    


 


Ammonia  42.0 umol/L (25-60)   08/25/18  15:17    


 


Total Creatine Kinase  148 units/L ()   08/19/18  17:44    


 


CK-MB (CK-2)  1.6 ng/mL (0.0-4.0)   08/19/18  17:44    


 


CK-MB (CK-2) Rel Index  1.0  (0-4)   08/19/18  17:44    


 


Troponin T  < 0.010 ng/mL (0.00-0.029)   08/19/18  17:43    


 


Total Protein  7.2 g/dL (6.3-8.2)   08/19/18  17:27    


 


Albumin  3.6 g/dL (3.9-5)  L  08/19/18  17:27    


 


Albumin/Globulin Ratio  1.0 %  08/19/18  17:27    


 


Vitamin B12  870.0 pg/mL (211-911)   08/25/18  15:13    


 


Folate  6.55 ng/mL (7.3-26.0)  L  08/25/18  15:15    


 


TSH  2.910 mlU/mL (0.270-4.200)   08/25/18  15:16    


 


Free T4  0.92 ng/dL (0.76-1.46)   08/19/18  17:27    


 


Urine Color  Shamika  (Yellow)   08/19/18  17:59    


 


Urine Turbidity  Cloudy  (Clear)   08/19/18  17:59    


 


Urine pH  7.0  (5.0-7.0)   08/19/18  17:59    


 


Ur Specific Gravity  1.021  (1.003-1.030)   08/19/18  17:59    


 


Urine Protein  30 mg/dl mg/dL (Negative)   08/19/18  17:59    


 


Urine Glucose (UA)  Neg mg/dL (Negative)   08/19/18  17:59    


 


Urine Ketones  Neg mg/dL (Negative)   08/19/18  17:59    


 


Urine Blood  Neg  (Negative)   08/19/18  17:59    


 


Urine Nitrite  Neg  (Negative)   08/19/18  17:59    


 


Urine Bilirubin  Neg  (Negative)   08/19/18  17:59    


 


Urine Urobilinogen  4.0 mg/dL (<2.0)   08/19/18  17:59    


 


Ur Leukocyte Esterase  Lg  (Negative)   08/19/18  17:59    


 


Urine WBC (Auto)  92.0 /HPF (0.0-6.0)  H  08/19/18  17:59    


 


Urine RBC (Auto)  6.0 /HPF (0.0-6.0)   08/19/18  17:59    


 


U Epithel Cells (Auto)  < 1.0 /HPF (0-13.0)   08/19/18  17:59    


 


Urine Bacteria (Auto)  3+ /HPF (Negative)   08/19/18  17:59    


 


Ur Transition Epith Cell  2 /HPF  08/19/18  17:59    


 


Urine Mucus  1+ /HPF  08/19/18  17:59    


 


Salicylates  < 0.3 mg/dL (2.8-20.0)  L  08/19/18  17:27    


 


Urine Opiates Screen  Presumptive negative   08/19/18  17:59    


 


Urine Methadone Screen  Presumptive negative   08/19/18  17:59    


 


Acetaminophen  < 5.0 ug/mL (10.0-30.0)  L  08/19/18  17:27    


 


Ur Barbiturates Screen  Presumptive negative   08/19/18  17:59    


 


Ur Phencyclidine Scrn  Presumptive negative   08/19/18  17:59    


 


Ur Amphetamines Screen  Presumptive negative   08/19/18  17:59    


 


U Benzodiazepines Scrn  Presumptive negative   08/19/18  17:59    


 


Urine Cocaine Screen  Presumptive negative   08/19/18  17:59    


 


U Marijuana (THC) Screen  Presumptive negative   08/19/18  17:59    


 


Drugs of Abuse Note  Disclamer   08/19/18  17:59    


 


Plasma/Serum Alcohol  < 0.01 % (0-0.07)   08/19/18  17:27

## 2018-09-07 NOTE — XRAY REPORT
FINAL REPORT



EXAM:  XR ABDOMEN 1V AP



HISTORY:  VERIFY PLACEMENT TO START TUBE FEEDING 



TECHNIQUE:  AP limited for tube placement 



PRIORS:  None.



FINDINGS:  

There is feeding tube present. The distal end is looped within

the fundus of the stomach tip approximately mid body of the

stomach.



There is moderate amount of stool throughout colon. No dilated

small bowel loops are identified. No signs free air. 



IMPRESSION:  

Tip of the feeding tube at the fundus/proximal body of the

stomach. Consider advancement

## 2018-09-07 NOTE — XRAY REPORT
FINAL REPORT



EXAM:  XR ABDOMEN 1V AP



HISTORY:  check dubhoff placement 



TECHNIQUE:  Supine abdomen for tube placement 



PRIORS:  Comparison is dated September 3, 2018



FINDINGS:  

There is Dobhoff tube present. The distal tip now overlies the

fundus of the stomach suggest advancement. 



Air is seen in scattered nondistended small bowel loops. There is

a moderate amount of stool and gas within the colon. No signs for

free air. 



IMPRESSION:  

Dobhoff tube with tip at the fundus of the stomach. Suggest

advancement 



Nonobstructive nonspecific bowel gas pattern

## 2018-09-08 RX ADMIN — HEPARIN SODIUM SCH UNIT: 5000 INJECTION, SOLUTION INTRAVENOUS; SUBCUTANEOUS at 22:40

## 2018-09-08 RX ADMIN — HEPARIN SODIUM SCH UNIT: 5000 INJECTION, SOLUTION INTRAVENOUS; SUBCUTANEOUS at 11:01

## 2018-09-08 RX ADMIN — DEXTROSE AND SODIUM CHLORIDE SCH MLS/HR: 5; .45 INJECTION, SOLUTION INTRAVENOUS at 11:00

## 2018-09-08 RX ADMIN — DEXTROSE AND SODIUM CHLORIDE SCH MLS/HR: 5; .45 INJECTION, SOLUTION INTRAVENOUS at 01:08

## 2018-09-08 RX ADMIN — DEXTROSE AND SODIUM CHLORIDE SCH MLS/HR: 5; .45 INJECTION, SOLUTION INTRAVENOUS at 22:38

## 2018-09-08 NOTE — XRAY REPORT
FINAL REPORT



EXAM:  XR ABDOMEN 1V AP



HISTORY:  VERIFY PLACEMENT TO START TUBE FEEDING. 



TECHNIQUE:  Single view of the abdomen.



PRIORS:  Abdomen x-ray September 7, 2018.



FINDINGS:  

Bowel gas appearance is nonspecific and non-distended.



There is no pneumoperitoneum. 



There is no air fluid level. 



There is no obstructive pattern.



Mild stool is present. 



There are no suspicious calcifications overlying the renal

shadows.



Enteric tube is redundant within the stomach. Tip is near the

fundus. 



IMPRESSION:  

Nonspecific nonobstructive bowel gas pattern.



Enteric tube tip is near the fundus of the stomach. Repositioning

recommended.

## 2018-09-09 RX ADMIN — DEXTROSE AND SODIUM CHLORIDE SCH MLS/HR: 5; .45 INJECTION, SOLUTION INTRAVENOUS at 19:27

## 2018-09-09 RX ADMIN — LORAZEPAM PRN MG: 2 INJECTION INTRAMUSCULAR; INTRAVENOUS at 14:43

## 2018-09-09 RX ADMIN — HEPARIN SODIUM SCH UNIT: 5000 INJECTION, SOLUTION INTRAVENOUS; SUBCUTANEOUS at 09:44

## 2018-09-09 RX ADMIN — HEPARIN SODIUM SCH UNIT: 5000 INJECTION, SOLUTION INTRAVENOUS; SUBCUTANEOUS at 22:52

## 2018-09-09 RX ADMIN — DEXTROSE AND SODIUM CHLORIDE SCH MLS/HR: 5; .45 INJECTION, SOLUTION INTRAVENOUS at 09:43

## 2018-09-09 NOTE — PROGRESS NOTE
Assessment and Plan





- Patient Problems


(1) Metabolic encephalopathy


Current Visit: Yes   Status: Acute   


Plan to address problem: 


Resolving.  She is much more alert and talkative.








(2) Catatonic state


Current Visit: Yes   Status: Acute   


Plan to address problem: 


Was likely secondary to schizophrenia it is improving.  Patient would neck bent 

to the right but has questions appropriately.  Hospital course unremarkable 

today.  Currently awaiting placement.








(3) Dehydration


Current Visit: Yes   Status: Acute   


Plan to address problem: 


Resolving.








(4) Fracture of lesser trochanter of femur


Current Visit: Yes   Status: Acute   


Plan to address problem: 


At present awaiting placement.








(5) Schizophrenia


Current Visit: Yes   Status: Acute   





(6) UTI (urinary tract infection)


Current Visit: Yes   Status: Acute   


Qualifiers: 


   Encounter type: initial encounter 


Plan to address problem: 


Resolved with antibiotics.








(7) Malnutrition


Current Visit: Yes   Status: Acute   


Plan to address problem: 


Patient now appears to be eating better no Dobbhoff tube required.  May not 

need PEG placement after all.








Subjective


Date of service: 09/09/18


Principal diagnosis: encephalopathy


Interval history: 





Patient increased conversation today.  Improving catatonia.  Patient with neck 

bit to the right but answers questions appropriately.  Awaiting placement.





Objective





- Constitutional


Vitals: 


 Vital Signs - 12hr











  09/09/18 09/09/18





  05:51 12:07


 


Temperature 97.8 F 98.1 F


 


Pulse Rate  78


 


Respiratory 18 16





Rate  


 


Blood Pressure 115/70 102/68


 


O2 Sat by Pulse  98





Oximetry  











General appearance: Present: no acute distress, well-nourished, other





- EENT


Eyes: PERRL, EOM intact


ENT: hearing intact, clear oral mucosa, other (patient holds his neck to the 

right unknown reason.  Pain.)


Ears: bilateral: normal





- Neck


Neck: supple, normal ROM





- Respiratory


Respiratory effort: normal


Respiratory: bilateral: CTA





- Breasts


Breasts: normal





- Cardiovascular


Rhythm: regular


Heart Sounds: Present: S1 & S2.  Absent: gallop, rub


Extremities: pulses intact, No edema, normal color, Full ROM





- Gastrointestinal


General gastrointestinal: Present: soft, non-tender, non-distended, normal 

bowel sounds





- Genitourinary


Male genitourinary: normal





- Integumentary


Integumentary: clear, warm, dry





- Musculoskeletal


Musculoskeletal: 1, strength equal bilaterally





- Neurologic


Neurologic: moves all extremities





- Psychiatric


Psychiatric: memory intact, appropriate mood/affect, intact judgment & insight





- Labs


CBC & Chem 7: 


 08/25/18 05:43





 08/25/18 05:43

## 2018-09-10 RX ADMIN — DEXTROSE AND SODIUM CHLORIDE SCH MLS/HR: 5; .45 INJECTION, SOLUTION INTRAVENOUS at 16:28

## 2018-09-10 RX ADMIN — HEPARIN SODIUM SCH UNIT: 5000 INJECTION, SOLUTION INTRAVENOUS; SUBCUTANEOUS at 09:52

## 2018-09-10 RX ADMIN — DEXTROSE AND SODIUM CHLORIDE SCH MLS/HR: 5; .45 INJECTION, SOLUTION INTRAVENOUS at 04:39

## 2018-09-10 RX ADMIN — HEPARIN SODIUM SCH UNIT: 5000 INJECTION, SOLUTION INTRAVENOUS; SUBCUTANEOUS at 22:30

## 2018-09-10 NOTE — PROGRESS NOTE
Assessment and Plan





- Patient Problems


(1) Metabolic encephalopathy


Current Visit: Yes   Status: Acute   


Plan to address problem: 


Resolving.  She is much more alert and talkative.








(2) Catatonic state


Current Visit: Yes   Status: Acute   


Plan to address problem: 


Was likely secondary to schizophrenia it is improving.  Patient would neck bent 

to the right but has questions appropriately.  Hospital course unremarkable 

today.  Currently awaiting placement.  No longer in a catatonic state.








(3) Dehydration


Current Visit: Yes   Status: Acute   


Plan to address problem: 


Resolving.








(4) Fracture of lesser trochanter of femur


Current Visit: Yes   Status: Acute   


Plan to address problem: 


At present awaiting placement.








(5) Schizophrenia


Current Visit: Yes   Status: Acute   





(6) UTI (urinary tract infection)


Current Visit: Yes   Status: Acute   


Qualifiers: 


   Encounter type: initial encounter 


Plan to address problem: 


Resolved with antibiotics.








(7) Malnutrition


Current Visit: Yes   Status: Acute   


Plan to address problem: 


Patient now appears to be eating better no Dobbhoff tube required.  May not 

need PEG placement after all.








History


Interval history: 





Patient increased conversation today.   Awaiting placement.





Hospitalist Physical





- Constitutional


Vitals: 


 











Temp Pulse Resp BP Pulse Ox


 


 98.7 F   76   20   102/65   97 


 


 09/10/18 11:59  09/10/18 11:59  09/10/18 11:59  09/10/18 11:59  09/10/18 11:59











General appearance: Present: no acute distress, well-nourished, other





- EENT


Eyes: Present: PERRL, EOM intact


ENT: hearing intact, clear oral mucosa, dentition normal





- Neck


Neck: Present: supple, normal ROM.  Absent: enlarged thyroid, masses or JVD, 

cervical LAD





- Cardiovascular


Heart Sounds: Present: S1 & S2, gallop.  Absent: systolic murmur, diastolic 

murmur





- Extremities


Extremities: no ischemia, pulses intact, pulses symmetrical, No edema, normal 

temperature, normal color


Peripheral Pulses: within normal limits





- Abdominal


General gastrointestinal: soft, non-tender, non-distended





- Integumentary


Integumentary: Present: clear, warm, dry.  Absent: rash





- Neurologic


Neurologic: CNII-XII intact, moves all extremities, other (poor cognition.)





Results





- Labs


CBC & Chem 7: 


 08/25/18 05:43





 08/25/18 05:43


Labs: 


 Laboratory Last Values











WBC  5.9 K/mm3 (4.5-11.0)   08/25/18  05:43    


 


RBC  4.40 M/mm3 (3.65-5.03)   08/25/18  05:43    


 


Hgb  12.4 gm/dl (11.8-15.2)   08/25/18  05:43    


 


Hct  35.1 % (35.5-45.6)  L  08/25/18  05:43    


 


MCV  80 fl (84-94)  L  08/25/18  05:43    


 


MCH  28 pg (28-32)   08/25/18  05:43    


 


MCHC  35 % (32-34)  H  08/25/18  05:43    


 


RDW  14.5 % (13.2-15.2)   08/25/18  05:43    


 


Plt Count  197 K/mm3 (140-440)   08/25/18  05:43    


 


Lymph % (Auto)  15.1 % (13.4-35.0)   08/25/18  05:43    


 


Mono % (Auto)  8.1 % (0.0-7.3)  H  08/25/18  05:43    


 


Eos % (Auto)  2.2 % (0.0-4.3)   08/25/18  05:43    


 


Baso % (Auto)  0.4 % (0.0-1.8)   08/25/18  05:43    


 


Lymph #  0.9 K/mm3 (1.2-5.4)  L  08/25/18  05:43    


 


Mono #  0.5 K/mm3 (0.0-0.8)   08/25/18  05:43    


 


Eos #  0.1 K/mm3 (0.0-0.4)   08/25/18  05:43    


 


Baso #  0.0 K/mm3 (0.0-0.1)   08/25/18  05:43    


 


Total Counted  Cancelled   08/19/18  17:27    


 


Seg Neutrophils %  74.2 % (40.0-70.0)  H  08/25/18  05:43    


 


Seg Neuts % (Manual)  Cancelled   08/19/18  17:27    


 


Band Neutrophils %  Cancelled   08/19/18  17:27    


 


Lymphocytes % (Manual)  Cancelled   08/19/18  17:27    


 


Reactive Lymphs % (Man)  Cancelled   08/19/18  17:27    


 


Monocytes % (Manual)  Cancelled   08/19/18  17:27    


 


Eosinophils % (Manual)  Cancelled   08/19/18  17:27    


 


Basophils % (Manual)  Cancelled   08/19/18  17:27    


 


Metamyelocytes %  Cancelled   08/19/18  17:27    


 


Myelocytes %  Cancelled   08/19/18  17:27    


 


Promyelocytes %  Cancelled   08/19/18  17:27    


 


Blast Cells %  Cancelled   08/19/18  17:27    


 


Nucleated RBC %  Cancelled   08/19/18  17:27    


 


Seg Neutrophils #  4.4 K/mm3 (1.8-7.7)   08/25/18  05:43    


 


Seg Neutrophils # Man  Cancelled   08/19/18  17:27    


 


Band Neutrophils #  Cancelled   08/19/18  17:27    


 


Lymphocytes # (Manual)  Cancelled   08/19/18  17:27    


 


Abs React Lymphs (Man)  Cancelled   08/19/18  17:27    


 


Monocytes # (Manual)  Cancelled   08/19/18  17:27    


 


Eosinophils # (Manual)  Cancelled   08/19/18  17:27    


 


Basophils # (Manual)  Cancelled   08/19/18  17:27    


 


Metamyelocytes #  Cancelled   08/19/18  17:27    


 


Myelocytes #  Cancelled   08/19/18  17:27    


 


Promyelocytes #  Cancelled   08/19/18  17:27    


 


Blast Cells #  Cancelled   08/19/18  17:27    


 


WBC Morphology  Cancelled   08/19/18  17:27    


 


Hypersegmented Neuts  Cancelled   08/19/18  17:27    


 


Hyposegmented Neuts  Cancelled   08/19/18  17:27    


 


Hypogranular Neuts  Cancelled   08/19/18  17:27    


 


Hypersegmented Polys  Cancelled   08/19/18  17:27    


 


Smudge Cells  Cancelled   08/19/18  17:27    


 


Toxic Granulation  Cancelled   08/19/18  17:27    


 


Toxic Vacuolation  Cancelled   08/19/18  17:27    


 


Dohle Bodies  Cancelled   08/19/18  17:27    


 


Pelger-Huet Anomaly  Cancelled   08/19/18  17:27    


 


Phillip Rods  Cancelled   08/19/18  17:27    


 


Platelet Estimate  Cancelled   08/19/18  17:27    


 


Clumped Platelets  Cancelled   08/19/18  17:27    


 


Plt Clumps, EDTA  Cancelled   08/19/18  17:27    


 


Large Platelets  Cancelled   08/19/18  17:27    


 


Giant Platelets  Cancelled   08/19/18  17:27    


 


Platelet Satelliting  Cancelled   08/19/18  17:27    


 


Plt Morphology Comment  Cancelled   08/19/18  17:27    


 


RBC Morphology  Cancelled   08/19/18  17:27    


 


Dimorphic RBCs  Cancelled   08/19/18  17:27    


 


Polychromasia  Cancelled   08/19/18  17:27    


 


Hypochromasia  Cancelled   08/19/18  17:27    


 


Poikilocytosis  Cancelled   08/19/18  17:27    


 


Basophilic Stippling  Cancelled   08/19/18  17:27    


 


Anisocytosis  Cancelled   08/19/18  17:27    


 


Microcytosis  Cancelled   08/19/18  17:27    


 


Macrocytosis  Cancelled   08/19/18  17:27    


 


Spherocytes  Cancelled   08/19/18  17:27    


 


Pappenheimer Bodies  Cancelled   08/19/18  17:27    


 


Sickle Cells  Cancelled   08/19/18  17:27    


 


Target Cells  Cancelled   08/19/18  17:27    


 


Tear Drop Cells  Cancelled   08/19/18  17:27    


 


Ovalocytes  Cancelled   08/19/18  17:27    


 


Stomatocytes  Cancelled   08/19/18  17:27    


 


Helmet Cells  Cancelled   08/19/18  17:27    


 


Burns-Jolly Bodies  Cancelled   08/19/18  17:27    


 


Cabot Rings  Cancelled   08/19/18  17:27    


 


Tamy Cells  Cancelled   08/19/18  17:27    


 


Bite Cells  Cancelled   08/19/18  17:27    


 


Crenated Cell  Cancelled   08/19/18  17:27    


 


Elliptocytes  Cancelled   08/19/18  17:27    


 


Acanthocytes (Spur)  Cancelled   08/19/18  17:27    


 


Rouleaux  Cancelled   08/19/18  17:27    


 


Hemoglobin C Crystals  Cancelled   08/19/18  17:27    


 


Schistocytes  Cancelled   08/19/18  17:27    


 


Malaria parasites  Cancelled   08/19/18  17:27    


 


Abhishek Bodies  Cancelled   08/19/18  17:27    


 


Hem Pathologist Commnt  Cancelled   08/19/18  17:27    


 


Sodium  135 mmol/L (137-145)  L  08/25/18  05:43    


 


Potassium  3.7 mmol/L (3.6-5.0)   08/25/18  05:43    


 


Chloride  99.9 mmol/L ()   08/25/18  05:43    


 


Carbon Dioxide  24 mmol/L (22-30)   08/25/18  05:43    


 


Anion Gap  15 mmol/L  08/25/18  05:43    


 


BUN  2 mg/dL (9-20)  L  08/25/18  05:43    


 


Creatinine  0.6 mg/dL (0.8-1.5)  L  08/25/18  05:43    


 


Estimated GFR  > 60 ml/min  08/25/18  05:43    


 


BUN/Creatinine Ratio  3 %  08/25/18  05:43    


 


Glucose  105 mg/dL ()  H  08/25/18  05:43    


 


POC Glucose  115  ()  H  09/04/18  23:08    


 


Calcium  9.0 mg/dL (8.4-10.2)   08/25/18  05:43    


 


Magnesium  2.20 mg/dL (1.7-2.3)   08/19/18  17:24    


 


Total Bilirubin  1.20 mg/dL (0.1-1.2)   08/19/18  17:27    


 


AST  50 units/L (5-40)  H  08/19/18  17:27    


 


ALT  63 units/L (7-56)  H  08/19/18  17:27    


 


Alkaline Phosphatase  67 units/L ()   08/19/18  17:27    


 


Ammonia  42.0 umol/L (25-60)   08/25/18  15:17    


 


Total Creatine Kinase  148 units/L ()   08/19/18  17:44    


 


CK-MB (CK-2)  1.6 ng/mL (0.0-4.0)   08/19/18  17:44    


 


CK-MB (CK-2) Rel Index  1.0  (0-4)   08/19/18  17:44    


 


Troponin T  < 0.010 ng/mL (0.00-0.029)   08/19/18  17:43    


 


Total Protein  7.2 g/dL (6.3-8.2)   08/19/18  17:27    


 


Albumin  3.6 g/dL (3.9-5)  L  08/19/18  17:27    


 


Albumin/Globulin Ratio  1.0 %  08/19/18  17:27    


 


Vitamin B12  870.0 pg/mL (211-911)   08/25/18  15:13    


 


Folate  6.55 ng/mL (7.3-26.0)  L  08/25/18  15:15    


 


TSH  2.910 mlU/mL (0.270-4.200)   08/25/18  15:16    


 


Free T4  0.92 ng/dL (0.76-1.46)   08/19/18  17:27    


 


Urine Color  Shamika  (Yellow)   08/19/18  17:59    


 


Urine Turbidity  Cloudy  (Clear)   08/19/18  17:59    


 


Urine pH  7.0  (5.0-7.0)   08/19/18  17:59    


 


Ur Specific Gravity  1.021  (1.003-1.030)   08/19/18  17:59    


 


Urine Protein  30 mg/dl mg/dL (Negative)   08/19/18  17:59    


 


Urine Glucose (UA)  Neg mg/dL (Negative)   08/19/18  17:59    


 


Urine Ketones  Neg mg/dL (Negative)   08/19/18  17:59    


 


Urine Blood  Neg  (Negative)   08/19/18  17:59    


 


Urine Nitrite  Neg  (Negative)   08/19/18  17:59    


 


Urine Bilirubin  Neg  (Negative)   08/19/18  17:59    


 


Urine Urobilinogen  4.0 mg/dL (<2.0)   08/19/18  17:59    


 


Ur Leukocyte Esterase  Lg  (Negative)   08/19/18  17:59    


 


Urine WBC (Auto)  92.0 /HPF (0.0-6.0)  H  08/19/18  17:59    


 


Urine RBC (Auto)  6.0 /HPF (0.0-6.0)   08/19/18  17:59    


 


U Epithel Cells (Auto)  < 1.0 /HPF (0-13.0)   08/19/18  17:59    


 


Urine Bacteria (Auto)  3+ /HPF (Negative)   08/19/18  17:59    


 


Ur Transition Epith Cell  2 /HPF  08/19/18  17:59    


 


Urine Mucus  1+ /HPF  08/19/18  17:59    


 


Salicylates  < 0.3 mg/dL (2.8-20.0)  L  08/19/18  17:27    


 


Urine Opiates Screen  Presumptive negative   08/19/18  17:59    


 


Urine Methadone Screen  Presumptive negative   08/19/18  17:59    


 


Acetaminophen  < 5.0 ug/mL (10.0-30.0)  L  08/19/18  17:27    


 


Ur Barbiturates Screen  Presumptive negative   08/19/18  17:59    


 


Ur Phencyclidine Scrn  Presumptive negative   08/19/18  17:59    


 


Ur Amphetamines Screen  Presumptive negative   08/19/18  17:59    


 


U Benzodiazepines Scrn  Presumptive negative   08/19/18  17:59    


 


Urine Cocaine Screen  Presumptive negative   08/19/18  17:59    


 


U Marijuana (THC) Screen  Presumptive negative   08/19/18  17:59    


 


Drugs of Abuse Note  Disclamer   08/19/18  17:59    


 


Plasma/Serum Alcohol  < 0.01 % (0-0.07)   08/19/18  17:27

## 2018-09-11 RX ADMIN — DEXTROSE AND SODIUM CHLORIDE SCH MLS/HR: 5; .45 INJECTION, SOLUTION INTRAVENOUS at 21:27

## 2018-09-11 RX ADMIN — HEPARIN SODIUM SCH UNIT: 5000 INJECTION, SOLUTION INTRAVENOUS; SUBCUTANEOUS at 21:28

## 2018-09-11 RX ADMIN — LORAZEPAM PRN MG: 2 INJECTION INTRAMUSCULAR; INTRAVENOUS at 10:33

## 2018-09-11 RX ADMIN — DEXTROSE AND SODIUM CHLORIDE SCH MLS/HR: 5; .45 INJECTION, SOLUTION INTRAVENOUS at 10:38

## 2018-09-11 RX ADMIN — DEXTROSE AND SODIUM CHLORIDE SCH MLS/HR: 5; .45 INJECTION, SOLUTION INTRAVENOUS at 01:10

## 2018-09-11 RX ADMIN — HEPARIN SODIUM SCH UNIT: 5000 INJECTION, SOLUTION INTRAVENOUS; SUBCUTANEOUS at 09:14

## 2018-09-11 NOTE — PROGRESS NOTE
Assessment and Plan


Assessment and plan: 





58-year-old -American male came from mental health facility for altered 

mental status


Catatonia, Psychiatry r/o catatonia, said the side effect of his pysch 

medications


- mental health consulted, and signed off


- Patient is catatonic


Metabolic encephalopathy


- Supportive care


- EEG showed moderate diffuse encephalopathy


- Showed some improvement


- CT normal finding, MRI showed encephalopathy


UTI


- Treated with IV antibiotics and resolved


Failure to eat


- Speech evaluation done and not at risk of aspiration


- Family refused PEG tube placement, patient may not achieve good nutrition 

with oral intake


Suspected femoral fracture


-Evaluated by orthopedics, reviewed his imaging and showed no fracture


-Per families request x-ray of the hip and femur was done and showed no fracture


Nutrition


- Patient is to 


DVT prophylaxis


Disposition


- Patient is unfunded, but patient need SNF and will discuss case management








History


Interval history: 





Patient was seen and evaluated this morning, no nursing issues overnight





Hospitalist Physical





- Physical exam


Narrative exam: 





 Not in cardiopulmonary distress. 


 The patient appeared well nourished and normally developed.


 Vital signs as documented.


 Head exam is unremarkable.


 No scleral icterus .


 Neck is without jugular venous distension, thyromegaly, or carotid bruits. 


 Lungs are clear to auscultation.


Cardiac exam reveals regular rate and  Rhythm. First and second heart sounds 

normal. No murmurs, rubs or gallops. 


Abdominal exam reveals normal bowel sounds, no masses, no organomegaly and no 

aortic enlargement. 


Extremities are nonedematous and both femoral and pedal pulses are normal.


CNS: 





- Constitutional


Vitals: 


 











Temp Pulse Resp BP Pulse Ox


 


 98.3 F   69   18   114/72   99 


 


 09/11/18 05:49  09/11/18 05:49  09/11/18 05:49  09/11/18 05:49  09/11/18 05:49











General appearance: Present: no acute distress, well-nourished, other





Results





- Labs


CBC & Chem 7: 


 08/25/18 05:43





 08/25/18 05:43


Labs: 


 Laboratory Last Values











WBC  5.9 K/mm3 (4.5-11.0)   08/25/18  05:43    


 


RBC  4.40 M/mm3 (3.65-5.03)   08/25/18  05:43    


 


Hgb  12.4 gm/dl (11.8-15.2)   08/25/18  05:43    


 


Hct  35.1 % (35.5-45.6)  L  08/25/18  05:43    


 


MCV  80 fl (84-94)  L  08/25/18  05:43    


 


MCH  28 pg (28-32)   08/25/18  05:43    


 


MCHC  35 % (32-34)  H  08/25/18  05:43    


 


RDW  14.5 % (13.2-15.2)   08/25/18  05:43    


 


Plt Count  197 K/mm3 (140-440)   08/25/18  05:43    


 


Lymph % (Auto)  15.1 % (13.4-35.0)   08/25/18  05:43    


 


Mono % (Auto)  8.1 % (0.0-7.3)  H  08/25/18  05:43    


 


Eos % (Auto)  2.2 % (0.0-4.3)   08/25/18  05:43    


 


Baso % (Auto)  0.4 % (0.0-1.8)   08/25/18  05:43    


 


Lymph #  0.9 K/mm3 (1.2-5.4)  L  08/25/18  05:43    


 


Mono #  0.5 K/mm3 (0.0-0.8)   08/25/18  05:43    


 


Eos #  0.1 K/mm3 (0.0-0.4)   08/25/18  05:43    


 


Baso #  0.0 K/mm3 (0.0-0.1)   08/25/18  05:43    


 


Total Counted  Cancelled   08/19/18  17:27    


 


Seg Neutrophils %  74.2 % (40.0-70.0)  H  08/25/18  05:43    


 


Seg Neuts % (Manual)  Cancelled   08/19/18  17:27    


 


Band Neutrophils %  Cancelled   08/19/18  17:27    


 


Lymphocytes % (Manual)  Cancelled   08/19/18  17:27    


 


Reactive Lymphs % (Man)  Cancelled   08/19/18  17:27    


 


Monocytes % (Manual)  Cancelled   08/19/18  17:27    


 


Eosinophils % (Manual)  Cancelled   08/19/18  17:27    


 


Basophils % (Manual)  Cancelled   08/19/18  17:27    


 


Metamyelocytes %  Cancelled   08/19/18  17:27    


 


Myelocytes %  Cancelled   08/19/18  17:27    


 


Promyelocytes %  Cancelled   08/19/18  17:27    


 


Blast Cells %  Cancelled   08/19/18  17:27    


 


Nucleated RBC %  Cancelled   08/19/18  17:27    


 


Seg Neutrophils #  4.4 K/mm3 (1.8-7.7)   08/25/18  05:43    


 


Seg Neutrophils # Man  Cancelled   08/19/18  17:27    


 


Band Neutrophils #  Cancelled   08/19/18  17:27    


 


Lymphocytes # (Manual)  Cancelled   08/19/18  17:27    


 


Abs React Lymphs (Man)  Cancelled   08/19/18  17:27    


 


Monocytes # (Manual)  Cancelled   08/19/18  17:27    


 


Eosinophils # (Manual)  Cancelled   08/19/18  17:27    


 


Basophils # (Manual)  Cancelled   08/19/18  17:27    


 


Metamyelocytes #  Cancelled   08/19/18  17:27    


 


Myelocytes #  Cancelled   08/19/18  17:27    


 


Promyelocytes #  Cancelled   08/19/18  17:27    


 


Blast Cells #  Cancelled   08/19/18  17:27    


 


WBC Morphology  Cancelled   08/19/18  17:27    


 


Hypersegmented Neuts  Cancelled   08/19/18  17:27    


 


Hyposegmented Neuts  Cancelled   08/19/18  17:27    


 


Hypogranular Neuts  Cancelled   08/19/18  17:27    


 


Hypersegmented Polys  Cancelled   08/19/18  17:27    


 


Smudge Cells  Cancelled   08/19/18  17:27    


 


Toxic Granulation  Cancelled   08/19/18  17:27    


 


Toxic Vacuolation  Cancelled   08/19/18  17:27    


 


Dohle Bodies  Cancelled   08/19/18  17:27    


 


Pelger-Huet Anomaly  Cancelled   08/19/18  17:27    


 


Phillip Rods  Cancelled   08/19/18  17:27    


 


Platelet Estimate  Cancelled   08/19/18  17:27    


 


Clumped Platelets  Cancelled   08/19/18  17:27    


 


Plt Clumps, EDTA  Cancelled   08/19/18  17:27    


 


Large Platelets  Cancelled   08/19/18  17:27    


 


Giant Platelets  Cancelled   08/19/18  17:27    


 


Platelet Satelliting  Cancelled   08/19/18  17:27    


 


Plt Morphology Comment  Cancelled   08/19/18  17:27    


 


RBC Morphology  Cancelled   08/19/18  17:27    


 


Dimorphic RBCs  Cancelled   08/19/18  17:27    


 


Polychromasia  Cancelled   08/19/18  17:27    


 


Hypochromasia  Cancelled   08/19/18  17:27    


 


Poikilocytosis  Cancelled   08/19/18  17:27    


 


Basophilic Stippling  Cancelled   08/19/18  17:27    


 


Anisocytosis  Cancelled   08/19/18  17:27    


 


Microcytosis  Cancelled   08/19/18  17:27    


 


Macrocytosis  Cancelled   08/19/18  17:27    


 


Spherocytes  Cancelled   08/19/18  17:27    


 


Pappenheimer Bodies  Cancelled   08/19/18  17:27    


 


Sickle Cells  Cancelled   08/19/18  17:27    


 


Target Cells  Cancelled   08/19/18  17:27    


 


Tear Drop Cells  Cancelled   08/19/18  17:27    


 


Ovalocytes  Cancelled   08/19/18  17:27    


 


Stomatocytes  Cancelled   08/19/18  17:27    


 


Helmet Cells  Cancelled   08/19/18  17:27    


 


Burns-Jolly Bodies  Cancelled   08/19/18  17:27    


 


Cabot Rings  Cancelled   08/19/18  17:27    


 


Tamy Cells  Cancelled   08/19/18  17:27    


 


Bite Cells  Cancelled   08/19/18  17:27    


 


Crenated Cell  Cancelled   08/19/18  17:27    


 


Elliptocytes  Cancelled   08/19/18  17:27    


 


Acanthocytes (Spur)  Cancelled   08/19/18  17:27    


 


Rouleaux  Cancelled   08/19/18  17:27    


 


Hemoglobin C Crystals  Cancelled   08/19/18  17:27    


 


Schistocytes  Cancelled   08/19/18  17:27    


 


Malaria parasites  Cancelled   08/19/18  17:27    


 


Abhishek Bodies  Cancelled   08/19/18  17:27    


 


Hem Pathologist Commnt  Cancelled   08/19/18  17:27    


 


Sodium  135 mmol/L (137-145)  L  08/25/18  05:43    


 


Potassium  3.7 mmol/L (3.6-5.0)   08/25/18  05:43    


 


Chloride  99.9 mmol/L ()   08/25/18  05:43    


 


Carbon Dioxide  24 mmol/L (22-30)   08/25/18  05:43    


 


Anion Gap  15 mmol/L  08/25/18  05:43    


 


BUN  2 mg/dL (9-20)  L  08/25/18  05:43    


 


Creatinine  0.6 mg/dL (0.8-1.5)  L  08/25/18  05:43    


 


Estimated GFR  > 60 ml/min  08/25/18  05:43    


 


BUN/Creatinine Ratio  3 %  08/25/18  05:43    


 


Glucose  105 mg/dL ()  H  08/25/18  05:43    


 


POC Glucose  115  ()  H  09/04/18  23:08    


 


Calcium  9.0 mg/dL (8.4-10.2)   08/25/18  05:43    


 


Magnesium  2.20 mg/dL (1.7-2.3)   08/19/18  17:24    


 


Total Bilirubin  1.20 mg/dL (0.1-1.2)   08/19/18  17:27    


 


AST  50 units/L (5-40)  H  08/19/18  17:27    


 


ALT  63 units/L (7-56)  H  08/19/18  17:27    


 


Alkaline Phosphatase  67 units/L ()   08/19/18  17:27    


 


Ammonia  42.0 umol/L (25-60)   08/25/18  15:17    


 


Total Creatine Kinase  148 units/L ()   08/19/18  17:44    


 


CK-MB (CK-2)  1.6 ng/mL (0.0-4.0)   08/19/18  17:44    


 


CK-MB (CK-2) Rel Index  1.0  (0-4)   08/19/18  17:44    


 


Troponin T  < 0.010 ng/mL (0.00-0.029)   08/19/18  17:43    


 


Total Protein  7.2 g/dL (6.3-8.2)   08/19/18  17:27    


 


Albumin  3.6 g/dL (3.9-5)  L  08/19/18  17:27    


 


Albumin/Globulin Ratio  1.0 %  08/19/18  17:27    


 


Vitamin B12  870.0 pg/mL (211-911)   08/25/18  15:13    


 


Folate  6.55 ng/mL (7.3-26.0)  L  08/25/18  15:15    


 


TSH  2.910 mlU/mL (0.270-4.200)   08/25/18  15:16    


 


Free T4  0.92 ng/dL (0.76-1.46)   08/19/18  17:27    


 


Urine Color  Shamika  (Yellow)   08/19/18  17:59    


 


Urine Turbidity  Cloudy  (Clear)   08/19/18  17:59    


 


Urine pH  7.0  (5.0-7.0)   08/19/18  17:59    


 


Ur Specific Gravity  1.021  (1.003-1.030)   08/19/18  17:59    


 


Urine Protein  30 mg/dl mg/dL (Negative)   08/19/18  17:59    


 


Urine Glucose (UA)  Neg mg/dL (Negative)   08/19/18  17:59    


 


Urine Ketones  Neg mg/dL (Negative)   08/19/18  17:59    


 


Urine Blood  Neg  (Negative)   08/19/18  17:59    


 


Urine Nitrite  Neg  (Negative)   08/19/18  17:59    


 


Urine Bilirubin  Neg  (Negative)   08/19/18  17:59    


 


Urine Urobilinogen  4.0 mg/dL (<2.0)   08/19/18  17:59    


 


Ur Leukocyte Esterase  Lg  (Negative)   08/19/18  17:59    


 


Urine WBC (Auto)  92.0 /HPF (0.0-6.0)  H  08/19/18  17:59    


 


Urine RBC (Auto)  6.0 /HPF (0.0-6.0)   08/19/18  17:59    


 


U Epithel Cells (Auto)  < 1.0 /HPF (0-13.0)   08/19/18  17:59    


 


Urine Bacteria (Auto)  3+ /HPF (Negative)   08/19/18  17:59    


 


Ur Transition Epith Cell  2 /HPF  08/19/18  17:59    


 


Urine Mucus  1+ /HPF  08/19/18  17:59    


 


Salicylates  < 0.3 mg/dL (2.8-20.0)  L  08/19/18  17:27    


 


Urine Opiates Screen  Presumptive negative   08/19/18  17:59    


 


Urine Methadone Screen  Presumptive negative   08/19/18  17:59    


 


Acetaminophen  < 5.0 ug/mL (10.0-30.0)  L  08/19/18  17:27    


 


Ur Barbiturates Screen  Presumptive negative   08/19/18  17:59    


 


Ur Phencyclidine Scrn  Presumptive negative   08/19/18  17:59    


 


Ur Amphetamines Screen  Presumptive negative   08/19/18  17:59    


 


U Benzodiazepines Scrn  Presumptive negative   08/19/18  17:59    


 


Urine Cocaine Screen  Presumptive negative   08/19/18  17:59    


 


U Marijuana (THC) Screen  Presumptive negative   08/19/18  17:59    


 


Drugs of Abuse Note  Disclamer   08/19/18  17:59    


 


Plasma/Serum Alcohol  < 0.01 % (0-0.07)   08/19/18  17:27

## 2018-09-12 RX ADMIN — LORAZEPAM PRN MG: 2 INJECTION INTRAMUSCULAR; INTRAVENOUS at 11:34

## 2018-09-12 RX ADMIN — HEPARIN SODIUM SCH UNIT: 5000 INJECTION, SOLUTION INTRAVENOUS; SUBCUTANEOUS at 11:34

## 2018-09-12 RX ADMIN — HEPARIN SODIUM SCH UNIT: 5000 INJECTION, SOLUTION INTRAVENOUS; SUBCUTANEOUS at 21:27

## 2018-09-12 NOTE — PROGRESS NOTE
Assessment and Plan


Assessment and plan: 





58-year-old -American male came from mental health facility for altered 

mental status


Catatonia, Psychiatry r/o catatonia, said the side effect of his pysch 

medications


- mental health consulted, and signed off


- Patient is catatonic


Metabolic encephalopathy


- Supportive care


- EEG showed moderate diffuse encephalopathy


- Showed some improvement


- CT normal finding, MRI showed encephalopathy


UTI


- Treated with IV antibiotics and resolved


Failure to eat


- Speech evaluation done and not at risk of aspiration


- Family refused PEG tube placement, patient may not achieve good nutrition 

with oral intake


Suspected femoral fracture


-Evaluated by orthopedics, reviewed his imaging and showed no fracture


-Per families request x-ray of the hip and femur was done and showed no fracture


Nutrition


- Patient is to 


DVT prophylaxis


Disposition


- Patient is unfunded, but patient need SNF and will discuss case management


- Patient is stable enough to discharge to SNF.








History


Interval history: 





Patient was seen and evaluated this morning, no nursing issues overnight





Hospitalist Physical





- Physical exam


Narrative exam: 





 Not in cardiopulmonary distress. 


 The patient appeared well nourished and normally developed.


 Vital signs as documented.


 Head exam is unremarkable.


 No scleral icterus .


 Neck is without jugular venous distension, thyromegaly, or carotid bruits. 


 Lungs are clear to auscultation.


Cardiac exam reveals regular rate and  Rhythm. First and second heart sounds 

normal. No murmurs, rubs or gallops. 


Abdominal exam reveals normal bowel sounds, no masses, no organomegaly and no 

aortic enlargement. 


Extremities are nonedematous and both femoral and pedal pulses are normal.


CNS: Patient was sleepy during examination because he took benzo just before I 

saw him.





- Constitutional


Vitals: 


 











Temp Pulse Resp BP Pulse Ox


 


 97.9 F   68   20   164/88   95 


 


 09/11/18 17:39  09/11/18 17:39  09/11/18 17:39  09/11/18 17:39  09/11/18 17:39











General appearance: Present: no acute distress, well-nourished, other





Results





- Labs


CBC & Chem 7: 


 08/25/18 05:43





 08/25/18 05:43


Labs: 


 Laboratory Last Values











WBC  5.9 K/mm3 (4.5-11.0)   08/25/18  05:43    


 


RBC  4.40 M/mm3 (3.65-5.03)   08/25/18  05:43    


 


Hgb  12.4 gm/dl (11.8-15.2)   08/25/18  05:43    


 


Hct  35.1 % (35.5-45.6)  L  08/25/18  05:43    


 


MCV  80 fl (84-94)  L  08/25/18  05:43    


 


MCH  28 pg (28-32)   08/25/18  05:43    


 


MCHC  35 % (32-34)  H  08/25/18  05:43    


 


RDW  14.5 % (13.2-15.2)   08/25/18  05:43    


 


Plt Count  197 K/mm3 (140-440)   08/25/18  05:43    


 


Lymph % (Auto)  15.1 % (13.4-35.0)   08/25/18  05:43    


 


Mono % (Auto)  8.1 % (0.0-7.3)  H  08/25/18  05:43    


 


Eos % (Auto)  2.2 % (0.0-4.3)   08/25/18  05:43    


 


Baso % (Auto)  0.4 % (0.0-1.8)   08/25/18  05:43    


 


Lymph #  0.9 K/mm3 (1.2-5.4)  L  08/25/18  05:43    


 


Mono #  0.5 K/mm3 (0.0-0.8)   08/25/18  05:43    


 


Eos #  0.1 K/mm3 (0.0-0.4)   08/25/18  05:43    


 


Baso #  0.0 K/mm3 (0.0-0.1)   08/25/18  05:43    


 


Total Counted  Cancelled   08/19/18  17:27    


 


Seg Neutrophils %  74.2 % (40.0-70.0)  H  08/25/18  05:43    


 


Seg Neuts % (Manual)  Cancelled   08/19/18  17:27    


 


Band Neutrophils %  Cancelled   08/19/18  17:27    


 


Lymphocytes % (Manual)  Cancelled   08/19/18  17:27    


 


Reactive Lymphs % (Man)  Cancelled   08/19/18  17:27    


 


Monocytes % (Manual)  Cancelled   08/19/18  17:27    


 


Eosinophils % (Manual)  Cancelled   08/19/18  17:27    


 


Basophils % (Manual)  Cancelled   08/19/18  17:27    


 


Metamyelocytes %  Cancelled   08/19/18  17:27    


 


Myelocytes %  Cancelled   08/19/18  17:27    


 


Promyelocytes %  Cancelled   08/19/18  17:27    


 


Blast Cells %  Cancelled   08/19/18  17:27    


 


Nucleated RBC %  Cancelled   08/19/18  17:27    


 


Seg Neutrophils #  4.4 K/mm3 (1.8-7.7)   08/25/18  05:43    


 


Seg Neutrophils # Man  Cancelled   08/19/18  17:27    


 


Band Neutrophils #  Cancelled   08/19/18  17:27    


 


Lymphocytes # (Manual)  Cancelled   08/19/18  17:27    


 


Abs React Lymphs (Man)  Cancelled   08/19/18  17:27    


 


Monocytes # (Manual)  Cancelled   08/19/18  17:27    


 


Eosinophils # (Manual)  Cancelled   08/19/18  17:27    


 


Basophils # (Manual)  Cancelled   08/19/18  17:27    


 


Metamyelocytes #  Cancelled   08/19/18  17:27    


 


Myelocytes #  Cancelled   08/19/18  17:27    


 


Promyelocytes #  Cancelled   08/19/18  17:27    


 


Blast Cells #  Cancelled   08/19/18  17:27    


 


WBC Morphology  Cancelled   08/19/18  17:27    


 


Hypersegmented Neuts  Cancelled   08/19/18  17:27    


 


Hyposegmented Neuts  Cancelled   08/19/18  17:27    


 


Hypogranular Neuts  Cancelled   08/19/18  17:27    


 


Hypersegmented Polys  Cancelled   08/19/18  17:27    


 


Smudge Cells  Cancelled   08/19/18  17:27    


 


Toxic Granulation  Cancelled   08/19/18  17:27    


 


Toxic Vacuolation  Cancelled   08/19/18  17:27    


 


Dohle Bodies  Cancelled   08/19/18  17:27    


 


Pelger-Huet Anomaly  Cancelled   08/19/18  17:27    


 


Phillip Rods  Cancelled   08/19/18  17:27    


 


Platelet Estimate  Cancelled   08/19/18  17:27    


 


Clumped Platelets  Cancelled   08/19/18  17:27    


 


Plt Clumps, EDTA  Cancelled   08/19/18  17:27    


 


Large Platelets  Cancelled   08/19/18  17:27    


 


Giant Platelets  Cancelled   08/19/18  17:27    


 


Platelet Satelliting  Cancelled   08/19/18  17:27    


 


Plt Morphology Comment  Cancelled   08/19/18  17:27    


 


RBC Morphology  Cancelled   08/19/18  17:27    


 


Dimorphic RBCs  Cancelled   08/19/18  17:27    


 


Polychromasia  Cancelled   08/19/18  17:27    


 


Hypochromasia  Cancelled   08/19/18  17:27    


 


Poikilocytosis  Cancelled   08/19/18  17:27    


 


Basophilic Stippling  Cancelled   08/19/18  17:27    


 


Anisocytosis  Cancelled   08/19/18  17:27    


 


Microcytosis  Cancelled   08/19/18  17:27    


 


Macrocytosis  Cancelled   08/19/18  17:27    


 


Spherocytes  Cancelled   08/19/18  17:27    


 


Pappenheimer Bodies  Cancelled   08/19/18  17:27    


 


Sickle Cells  Cancelled   08/19/18  17:27    


 


Target Cells  Cancelled   08/19/18  17:27    


 


Tear Drop Cells  Cancelled   08/19/18  17:27    


 


Ovalocytes  Cancelled   08/19/18  17:27    


 


Stomatocytes  Cancelled   08/19/18  17:27    


 


Helmet Cells  Cancelled   08/19/18  17:27    


 


Burns-Jolly Bodies  Cancelled   08/19/18  17:27    


 


Cabot Rings  Cancelled   08/19/18  17:27    


 


Tamy Cells  Cancelled   08/19/18  17:27    


 


Bite Cells  Cancelled   08/19/18  17:27    


 


Crenated Cell  Cancelled   08/19/18  17:27    


 


Elliptocytes  Cancelled   08/19/18  17:27    


 


Acanthocytes (Spur)  Cancelled   08/19/18  17:27    


 


Rouleaux  Cancelled   08/19/18  17:27    


 


Hemoglobin C Crystals  Cancelled   08/19/18  17:27    


 


Schistocytes  Cancelled   08/19/18  17:27    


 


Malaria parasites  Cancelled   08/19/18  17:27    


 


Abhishek Bodies  Cancelled   08/19/18  17:27    


 


Hem Pathologist Commnt  Cancelled   08/19/18  17:27    


 


Sodium  135 mmol/L (137-145)  L  08/25/18  05:43    


 


Potassium  3.7 mmol/L (3.6-5.0)   08/25/18  05:43    


 


Chloride  99.9 mmol/L ()   08/25/18  05:43    


 


Carbon Dioxide  24 mmol/L (22-30)   08/25/18  05:43    


 


Anion Gap  15 mmol/L  08/25/18  05:43    


 


BUN  2 mg/dL (9-20)  L  08/25/18  05:43    


 


Creatinine  0.6 mg/dL (0.8-1.5)  L  08/25/18  05:43    


 


Estimated GFR  > 60 ml/min  08/25/18  05:43    


 


BUN/Creatinine Ratio  3 %  08/25/18  05:43    


 


Glucose  105 mg/dL ()  H  08/25/18  05:43    


 


POC Glucose  115  ()  H  09/04/18  23:08    


 


Calcium  9.0 mg/dL (8.4-10.2)   08/25/18  05:43    


 


Magnesium  2.20 mg/dL (1.7-2.3)   08/19/18  17:24    


 


Total Bilirubin  1.20 mg/dL (0.1-1.2)   08/19/18  17:27    


 


AST  50 units/L (5-40)  H  08/19/18  17:27    


 


ALT  63 units/L (7-56)  H  08/19/18  17:27    


 


Alkaline Phosphatase  67 units/L ()   08/19/18  17:27    


 


Ammonia  42.0 umol/L (25-60)   08/25/18  15:17    


 


Total Creatine Kinase  148 units/L ()   08/19/18  17:44    


 


CK-MB (CK-2)  1.6 ng/mL (0.0-4.0)   08/19/18  17:44    


 


CK-MB (CK-2) Rel Index  1.0  (0-4)   08/19/18  17:44    


 


Troponin T  < 0.010 ng/mL (0.00-0.029)   08/19/18  17:43    


 


Total Protein  7.2 g/dL (6.3-8.2)   08/19/18  17:27    


 


Albumin  3.6 g/dL (3.9-5)  L  08/19/18  17:27    


 


Albumin/Globulin Ratio  1.0 %  08/19/18  17:27    


 


Vitamin B12  870.0 pg/mL (211-911)   08/25/18  15:13    


 


Folate  6.55 ng/mL (7.3-26.0)  L  08/25/18  15:15    


 


TSH  2.910 mlU/mL (0.270-4.200)   08/25/18  15:16    


 


Free T4  0.92 ng/dL (0.76-1.46)   08/19/18  17:27    


 


Urine Color  Shamika  (Yellow)   08/19/18  17:59    


 


Urine Turbidity  Cloudy  (Clear)   08/19/18  17:59    


 


Urine pH  7.0  (5.0-7.0)   08/19/18  17:59    


 


Ur Specific Gravity  1.021  (1.003-1.030)   08/19/18  17:59    


 


Urine Protein  30 mg/dl mg/dL (Negative)   08/19/18  17:59    


 


Urine Glucose (UA)  Neg mg/dL (Negative)   08/19/18  17:59    


 


Urine Ketones  Neg mg/dL (Negative)   08/19/18  17:59    


 


Urine Blood  Neg  (Negative)   08/19/18  17:59    


 


Urine Nitrite  Neg  (Negative)   08/19/18  17:59    


 


Urine Bilirubin  Neg  (Negative)   08/19/18  17:59    


 


Urine Urobilinogen  4.0 mg/dL (<2.0)   08/19/18  17:59    


 


Ur Leukocyte Esterase  Lg  (Negative)   08/19/18  17:59    


 


Urine WBC (Auto)  92.0 /HPF (0.0-6.0)  H  08/19/18  17:59    


 


Urine RBC (Auto)  6.0 /HPF (0.0-6.0)   08/19/18  17:59    


 


U Epithel Cells (Auto)  < 1.0 /HPF (0-13.0)   08/19/18  17:59    


 


Urine Bacteria (Auto)  3+ /HPF (Negative)   08/19/18  17:59    


 


Ur Transition Epith Cell  2 /HPF  08/19/18  17:59    


 


Urine Mucus  1+ /HPF  08/19/18  17:59    


 


Salicylates  < 0.3 mg/dL (2.8-20.0)  L  08/19/18  17:27    


 


Urine Opiates Screen  Presumptive negative   08/19/18  17:59    


 


Urine Methadone Screen  Presumptive negative   08/19/18  17:59    


 


Acetaminophen  < 5.0 ug/mL (10.0-30.0)  L  08/19/18  17:27    


 


Ur Barbiturates Screen  Presumptive negative   08/19/18  17:59    


 


Ur Phencyclidine Scrn  Presumptive negative   08/19/18  17:59    


 


Ur Amphetamines Screen  Presumptive negative   08/19/18  17:59    


 


U Benzodiazepines Scrn  Presumptive negative   08/19/18  17:59    


 


Urine Cocaine Screen  Presumptive negative   08/19/18  17:59    


 


U Marijuana (THC) Screen  Presumptive negative   08/19/18  17:59    


 


Drugs of Abuse Note  Disclamer   08/19/18  17:59    


 


Plasma/Serum Alcohol  < 0.01 % (0-0.07)   08/19/18  17:27

## 2018-09-13 RX ADMIN — DEXTROSE AND SODIUM CHLORIDE SCH MLS/HR: 5; .45 INJECTION, SOLUTION INTRAVENOUS at 02:54

## 2018-09-13 RX ADMIN — DEXTROSE AND SODIUM CHLORIDE SCH MLS/HR: 5; .45 INJECTION, SOLUTION INTRAVENOUS at 22:53

## 2018-09-13 RX ADMIN — HEPARIN SODIUM SCH UNIT: 5000 INJECTION, SOLUTION INTRAVENOUS; SUBCUTANEOUS at 10:38

## 2018-09-13 RX ADMIN — HEPARIN SODIUM SCH UNIT: 5000 INJECTION, SOLUTION INTRAVENOUS; SUBCUTANEOUS at 22:49

## 2018-09-13 NOTE — PROGRESS NOTE
Assessment and Plan


Assessment and plan: 





58-year-old -American male came from mental health facility for altered 

mental status


Catatonia, Psychiatry r/o catatonia, said the side effect of his pysch 

medications


- mental health consulted, and signed off


- Patient is not catatonic


Metabolic encephalopathy


- Supportive care


- EEG showed moderate diffuse encephalopathy


- CT normal finding, MRI showed encephalopathy


- Patient improved


UTI


- Treated with IV antibiotics and resolved


Failure to eat/malnutrition


- patient is on pured diet


Suspected femoral fracture


-Evaluated by orthopedics, reviewed his imaging and showed no fracture


-Per families request x-ray of the hip and femur was done and showed no fracture


DVT prophylaxis


Disposition


- Patient is unfunded, but patient need SNF and will discuss case management


- Patient is stable enough to discharge to SNF.








History


Interval history: 





Patient was seen and evaluated this morning, no nursing issues overnight.  

Patient said he is feeling amandeep.





Hospitalist Physical





- Physical exam


Narrative exam: 





 Not in cardiopulmonary distress. 


 The patient appeared well nourished and normally developed.


 Vital signs as documented.


 Head exam is unremarkable.


 No scleral icterus .


 Neck is without jugular venous distension, thyromegaly, or carotid bruits. 


 Lungs are clear to auscultation.


Cardiac exam reveals regular rate and  Rhythm. First and second heart sounds 

normal. No murmurs, rubs or gallops. 


Abdominal exam reveals normal bowel sounds, no masses, no organomegaly and no 

aortic enlargement. 


Extremities are nonedematous and both femoral and pedal pulses are normal.


CNS: Patient was alert and oriented X1.





- Constitutional


Vitals: 


 











Temp Pulse Resp BP Pulse Ox


 


 98.2 F   75   20   131/93   98 


 


 09/13/18 06:16  09/12/18 17:37  09/13/18 06:16  09/13/18 06:16  09/12/18 17:37











General appearance: Present: no acute distress, well-nourished, other





Results





- Labs


CBC & Chem 7: 


 08/25/18 05:43





 08/25/18 05:43


Labs: 


 Laboratory Last Values











WBC  5.9 K/mm3 (4.5-11.0)   08/25/18  05:43    


 


RBC  4.40 M/mm3 (3.65-5.03)   08/25/18  05:43    


 


Hgb  12.4 gm/dl (11.8-15.2)   08/25/18  05:43    


 


Hct  35.1 % (35.5-45.6)  L  08/25/18  05:43    


 


MCV  80 fl (84-94)  L  08/25/18  05:43    


 


MCH  28 pg (28-32)   08/25/18  05:43    


 


MCHC  35 % (32-34)  H  08/25/18  05:43    


 


RDW  14.5 % (13.2-15.2)   08/25/18  05:43    


 


Plt Count  197 K/mm3 (140-440)   08/25/18  05:43    


 


Lymph % (Auto)  15.1 % (13.4-35.0)   08/25/18  05:43    


 


Mono % (Auto)  8.1 % (0.0-7.3)  H  08/25/18  05:43    


 


Eos % (Auto)  2.2 % (0.0-4.3)   08/25/18  05:43    


 


Baso % (Auto)  0.4 % (0.0-1.8)   08/25/18  05:43    


 


Lymph #  0.9 K/mm3 (1.2-5.4)  L  08/25/18  05:43    


 


Mono #  0.5 K/mm3 (0.0-0.8)   08/25/18  05:43    


 


Eos #  0.1 K/mm3 (0.0-0.4)   08/25/18  05:43    


 


Baso #  0.0 K/mm3 (0.0-0.1)   08/25/18  05:43    


 


Total Counted  Cancelled   08/19/18  17:27    


 


Seg Neutrophils %  74.2 % (40.0-70.0)  H  08/25/18  05:43    


 


Seg Neuts % (Manual)  Cancelled   08/19/18  17:27    


 


Band Neutrophils %  Cancelled   08/19/18  17:27    


 


Lymphocytes % (Manual)  Cancelled   08/19/18  17:27    


 


Reactive Lymphs % (Man)  Cancelled   08/19/18  17:27    


 


Monocytes % (Manual)  Cancelled   08/19/18  17:27    


 


Eosinophils % (Manual)  Cancelled   08/19/18  17:27    


 


Basophils % (Manual)  Cancelled   08/19/18  17:27    


 


Metamyelocytes %  Cancelled   08/19/18  17:27    


 


Myelocytes %  Cancelled   08/19/18  17:27    


 


Promyelocytes %  Cancelled   08/19/18  17:27    


 


Blast Cells %  Cancelled   08/19/18  17:27    


 


Nucleated RBC %  Cancelled   08/19/18  17:27    


 


Seg Neutrophils #  4.4 K/mm3 (1.8-7.7)   08/25/18  05:43    


 


Seg Neutrophils # Man  Cancelled   08/19/18  17:27    


 


Band Neutrophils #  Cancelled   08/19/18  17:27    


 


Lymphocytes # (Manual)  Cancelled   08/19/18  17:27    


 


Abs React Lymphs (Man)  Cancelled   08/19/18  17:27    


 


Monocytes # (Manual)  Cancelled   08/19/18  17:27    


 


Eosinophils # (Manual)  Cancelled   08/19/18  17:27    


 


Basophils # (Manual)  Cancelled   08/19/18  17:27    


 


Metamyelocytes #  Cancelled   08/19/18  17:27    


 


Myelocytes #  Cancelled   08/19/18  17:27    


 


Promyelocytes #  Cancelled   08/19/18  17:27    


 


Blast Cells #  Cancelled   08/19/18  17:27    


 


WBC Morphology  Cancelled   08/19/18  17:27    


 


Hypersegmented Neuts  Cancelled   08/19/18  17:27    


 


Hyposegmented Neuts  Cancelled   08/19/18  17:27    


 


Hypogranular Neuts  Cancelled   08/19/18  17:27    


 


Hypersegmented Polys  Cancelled   08/19/18  17:27    


 


Smudge Cells  Cancelled   08/19/18  17:27    


 


Toxic Granulation  Cancelled   08/19/18  17:27    


 


Toxic Vacuolation  Cancelled   08/19/18  17:27    


 


Dohle Bodies  Cancelled   08/19/18  17:27    


 


Pelger-Huet Anomaly  Cancelled   08/19/18  17:27    


 


Phillip Rods  Cancelled   08/19/18  17:27    


 


Platelet Estimate  Cancelled   08/19/18  17:27    


 


Clumped Platelets  Cancelled   08/19/18  17:27    


 


Plt Clumps, EDTA  Cancelled   08/19/18  17:27    


 


Large Platelets  Cancelled   08/19/18  17:27    


 


Giant Platelets  Cancelled   08/19/18  17:27    


 


Platelet Satelliting  Cancelled   08/19/18  17:27    


 


Plt Morphology Comment  Cancelled   08/19/18  17:27    


 


RBC Morphology  Cancelled   08/19/18  17:27    


 


Dimorphic RBCs  Cancelled   08/19/18  17:27    


 


Polychromasia  Cancelled   08/19/18  17:27    


 


Hypochromasia  Cancelled   08/19/18  17:27    


 


Poikilocytosis  Cancelled   08/19/18  17:27    


 


Basophilic Stippling  Cancelled   08/19/18  17:27    


 


Anisocytosis  Cancelled   08/19/18  17:27    


 


Microcytosis  Cancelled   08/19/18  17:27    


 


Macrocytosis  Cancelled   08/19/18  17:27    


 


Spherocytes  Cancelled   08/19/18  17:27    


 


Pappenheimer Bodies  Cancelled   08/19/18  17:27    


 


Sickle Cells  Cancelled   08/19/18  17:27    


 


Target Cells  Cancelled   08/19/18  17:27    


 


Tear Drop Cells  Cancelled   08/19/18  17:27    


 


Ovalocytes  Cancelled   08/19/18  17:27    


 


Stomatocytes  Cancelled   08/19/18  17:27    


 


Helmet Cells  Cancelled   08/19/18  17:27    


 


Burns-Jolly Bodies  Cancelled   08/19/18  17:27    


 


Cabot Rings  Cancelled   08/19/18  17:27    


 


Tamy Cells  Cancelled   08/19/18  17:27    


 


Bite Cells  Cancelled   08/19/18  17:27    


 


Crenated Cell  Cancelled   08/19/18  17:27    


 


Elliptocytes  Cancelled   08/19/18  17:27    


 


Acanthocytes (Spur)  Cancelled   08/19/18  17:27    


 


Rouleaux  Cancelled   08/19/18  17:27    


 


Hemoglobin C Crystals  Cancelled   08/19/18  17:27    


 


Schistocytes  Cancelled   08/19/18  17:27    


 


Malaria parasites  Cancelled   08/19/18  17:27    


 


Abhishek Bodies  Cancelled   08/19/18  17:27    


 


Hem Pathologist Commnt  Cancelled   08/19/18  17:27    


 


Sodium  135 mmol/L (137-145)  L  08/25/18  05:43    


 


Potassium  3.7 mmol/L (3.6-5.0)   08/25/18  05:43    


 


Chloride  99.9 mmol/L ()   08/25/18  05:43    


 


Carbon Dioxide  24 mmol/L (22-30)   08/25/18  05:43    


 


Anion Gap  15 mmol/L  08/25/18  05:43    


 


BUN  2 mg/dL (9-20)  L  08/25/18  05:43    


 


Creatinine  0.6 mg/dL (0.8-1.5)  L  08/25/18  05:43    


 


Estimated GFR  > 60 ml/min  08/25/18  05:43    


 


BUN/Creatinine Ratio  3 %  08/25/18  05:43    


 


Glucose  105 mg/dL ()  H  08/25/18  05:43    


 


POC Glucose  115  ()  H  09/04/18  23:08    


 


Calcium  9.0 mg/dL (8.4-10.2)   08/25/18  05:43    


 


Magnesium  2.20 mg/dL (1.7-2.3)   08/19/18  17:24    


 


Total Bilirubin  1.20 mg/dL (0.1-1.2)   08/19/18  17:27    


 


AST  50 units/L (5-40)  H  08/19/18  17:27    


 


ALT  63 units/L (7-56)  H  08/19/18  17:27    


 


Alkaline Phosphatase  67 units/L ()   08/19/18  17:27    


 


Ammonia  42.0 umol/L (25-60)   08/25/18  15:17    


 


Total Creatine Kinase  148 units/L ()   08/19/18  17:44    


 


CK-MB (CK-2)  1.6 ng/mL (0.0-4.0)   08/19/18  17:44    


 


CK-MB (CK-2) Rel Index  1.0  (0-4)   08/19/18  17:44    


 


Troponin T  < 0.010 ng/mL (0.00-0.029)   08/19/18  17:43    


 


Total Protein  7.2 g/dL (6.3-8.2)   08/19/18  17:27    


 


Albumin  3.6 g/dL (3.9-5)  L  08/19/18  17:27    


 


Albumin/Globulin Ratio  1.0 %  08/19/18  17:27    


 


Vitamin B12  870.0 pg/mL (211-911)   08/25/18  15:13    


 


Folate  6.55 ng/mL (7.3-26.0)  L  08/25/18  15:15    


 


TSH  2.910 mlU/mL (0.270-4.200)   08/25/18  15:16    


 


Free T4  0.92 ng/dL (0.76-1.46)   08/19/18  17:27    


 


Urine Color  Shamika  (Yellow)   08/19/18  17:59    


 


Urine Turbidity  Cloudy  (Clear)   08/19/18  17:59    


 


Urine pH  7.0  (5.0-7.0)   08/19/18  17:59    


 


Ur Specific Gravity  1.021  (1.003-1.030)   08/19/18  17:59    


 


Urine Protein  30 mg/dl mg/dL (Negative)   08/19/18  17:59    


 


Urine Glucose (UA)  Neg mg/dL (Negative)   08/19/18  17:59    


 


Urine Ketones  Neg mg/dL (Negative)   08/19/18  17:59    


 


Urine Blood  Neg  (Negative)   08/19/18  17:59    


 


Urine Nitrite  Neg  (Negative)   08/19/18  17:59    


 


Urine Bilirubin  Neg  (Negative)   08/19/18  17:59    


 


Urine Urobilinogen  4.0 mg/dL (<2.0)   08/19/18  17:59    


 


Ur Leukocyte Esterase  Lg  (Negative)   08/19/18  17:59    


 


Urine WBC (Auto)  92.0 /HPF (0.0-6.0)  H  08/19/18  17:59    


 


Urine RBC (Auto)  6.0 /HPF (0.0-6.0)   08/19/18  17:59    


 


U Epithel Cells (Auto)  < 1.0 /HPF (0-13.0)   08/19/18  17:59    


 


Urine Bacteria (Auto)  3+ /HPF (Negative)   08/19/18  17:59    


 


Ur Transition Epith Cell  2 /HPF  08/19/18  17:59    


 


Urine Mucus  1+ /HPF  08/19/18  17:59    


 


Salicylates  < 0.3 mg/dL (2.8-20.0)  L  08/19/18  17:27    


 


Urine Opiates Screen  Presumptive negative   08/19/18  17:59    


 


Urine Methadone Screen  Presumptive negative   08/19/18  17:59    


 


Acetaminophen  < 5.0 ug/mL (10.0-30.0)  L  08/19/18  17:27    


 


Ur Barbiturates Screen  Presumptive negative   08/19/18  17:59    


 


Ur Phencyclidine Scrn  Presumptive negative   08/19/18  17:59    


 


Ur Amphetamines Screen  Presumptive negative   08/19/18  17:59    


 


U Benzodiazepines Scrn  Presumptive negative   08/19/18  17:59    


 


Urine Cocaine Screen  Presumptive negative   08/19/18  17:59    


 


U Marijuana (THC) Screen  Presumptive negative   08/19/18  17:59    


 


Drugs of Abuse Note  Disclamer   08/19/18  17:59    


 


Plasma/Serum Alcohol  < 0.01 % (0-0.07)   08/19/18  17:27

## 2018-09-14 RX ADMIN — HEPARIN SODIUM SCH UNIT: 5000 INJECTION, SOLUTION INTRAVENOUS; SUBCUTANEOUS at 22:01

## 2018-09-14 RX ADMIN — DEXTROSE AND SODIUM CHLORIDE SCH MLS/HR: 5; .45 INJECTION, SOLUTION INTRAVENOUS at 18:11

## 2018-09-14 RX ADMIN — HEPARIN SODIUM SCH UNIT: 5000 INJECTION, SOLUTION INTRAVENOUS; SUBCUTANEOUS at 11:50

## 2018-09-14 NOTE — PROGRESS NOTE
Assessment and Plan


Assessment and plan: 





58-year-old -American male came from mental health facility for altered 

mental status


Catatonia, Psychiatry r/o catatonia, said the side effect of his pysch 

medications


- mental health consulted, and signed off


- Patient is not catatonic


Metabolic encephalopathy


- Supportive care


- EEG showed moderate diffuse encephalopathy


- CT normal finding, MRI showed encephalopathy


- Patient improved


UTI


- Treated with IV antibiotics and resolved


Failure to eat/malnutrition


- patient is on pured diet


Suspected femoral fracture


-Evaluated by orthopedics, reviewed his imaging and showed no fracture


DVT prophylaxis


Disposition


- Patient is unfunded, but patient need SNF


- Patient is stable enough to discharge to SNF.








History


Interval history: 





Patient was seen and evaluated this morning, no nursing issues overnight.  

Patient said he is feeling amandeep. Patient had physical therapy this morning and 

he walk with assistance by PT.





Hospitalist Physical





- Physical exam


Narrative exam: 





 Not in cardiopulmonary distress. 


 The patient appeared well nourished and normally developed.


 Vital signs as documented.


 Head exam is unremarkable.


 No scleral icterus .


 Neck is without jugular venous distension, thyromegaly, or carotid bruits. 


 Lungs are clear to auscultation.


Cardiac exam reveals regular rate and  Rhythm. First and second heart sounds 

normal. No murmurs, rubs or gallops. 


Abdominal exam reveals normal bowel sounds, no masses, no organomegaly and no 

aortic enlargement. 


Extremities are nonedematous and both femoral and pedal pulses are normal.


CNS: Patient was alert and oriented X1.





- Constitutional


Vitals: 


 











Temp Pulse Resp BP Pulse Ox


 


 98.2 F   72   20   113/77   98 


 


 09/14/18 06:19  09/13/18 23:57  09/14/18 06:19  09/14/18 06:19  09/13/18 23:57











General appearance: Present: no acute distress, well-nourished, other





Results





- Labs


CBC & Chem 7: 


 08/25/18 05:43





 08/25/18 05:43


Labs: 


 Laboratory Last Values











WBC  5.9 K/mm3 (4.5-11.0)   08/25/18  05:43    


 


RBC  4.40 M/mm3 (3.65-5.03)   08/25/18  05:43    


 


Hgb  12.4 gm/dl (11.8-15.2)   08/25/18  05:43    


 


Hct  35.1 % (35.5-45.6)  L  08/25/18  05:43    


 


MCV  80 fl (84-94)  L  08/25/18  05:43    


 


MCH  28 pg (28-32)   08/25/18  05:43    


 


MCHC  35 % (32-34)  H  08/25/18  05:43    


 


RDW  14.5 % (13.2-15.2)   08/25/18  05:43    


 


Plt Count  197 K/mm3 (140-440)   08/25/18  05:43    


 


Lymph % (Auto)  15.1 % (13.4-35.0)   08/25/18  05:43    


 


Mono % (Auto)  8.1 % (0.0-7.3)  H  08/25/18  05:43    


 


Eos % (Auto)  2.2 % (0.0-4.3)   08/25/18  05:43    


 


Baso % (Auto)  0.4 % (0.0-1.8)   08/25/18  05:43    


 


Lymph #  0.9 K/mm3 (1.2-5.4)  L  08/25/18  05:43    


 


Mono #  0.5 K/mm3 (0.0-0.8)   08/25/18  05:43    


 


Eos #  0.1 K/mm3 (0.0-0.4)   08/25/18  05:43    


 


Baso #  0.0 K/mm3 (0.0-0.1)   08/25/18  05:43    


 


Total Counted  Cancelled   08/19/18  17:27    


 


Seg Neutrophils %  74.2 % (40.0-70.0)  H  08/25/18  05:43    


 


Seg Neuts % (Manual)  Cancelled   08/19/18  17:27    


 


Band Neutrophils %  Cancelled   08/19/18  17:27    


 


Lymphocytes % (Manual)  Cancelled   08/19/18  17:27    


 


Reactive Lymphs % (Man)  Cancelled   08/19/18  17:27    


 


Monocytes % (Manual)  Cancelled   08/19/18  17:27    


 


Eosinophils % (Manual)  Cancelled   08/19/18  17:27    


 


Basophils % (Manual)  Cancelled   08/19/18  17:27    


 


Metamyelocytes %  Cancelled   08/19/18  17:27    


 


Myelocytes %  Cancelled   08/19/18  17:27    


 


Promyelocytes %  Cancelled   08/19/18  17:27    


 


Blast Cells %  Cancelled   08/19/18  17:27    


 


Nucleated RBC %  Cancelled   08/19/18  17:27    


 


Seg Neutrophils #  4.4 K/mm3 (1.8-7.7)   08/25/18  05:43    


 


Seg Neutrophils # Man  Cancelled   08/19/18  17:27    


 


Band Neutrophils #  Cancelled   08/19/18  17:27    


 


Lymphocytes # (Manual)  Cancelled   08/19/18  17:27    


 


Abs React Lymphs (Man)  Cancelled   08/19/18  17:27    


 


Monocytes # (Manual)  Cancelled   08/19/18  17:27    


 


Eosinophils # (Manual)  Cancelled   08/19/18  17:27    


 


Basophils # (Manual)  Cancelled   08/19/18  17:27    


 


Metamyelocytes #  Cancelled   08/19/18  17:27    


 


Myelocytes #  Cancelled   08/19/18  17:27    


 


Promyelocytes #  Cancelled   08/19/18  17:27    


 


Blast Cells #  Cancelled   08/19/18  17:27    


 


WBC Morphology  Cancelled   08/19/18  17:27    


 


Hypersegmented Neuts  Cancelled   08/19/18  17:27    


 


Hyposegmented Neuts  Cancelled   08/19/18  17:27    


 


Hypogranular Neuts  Cancelled   08/19/18  17:27    


 


Hypersegmented Polys  Cancelled   08/19/18  17:27    


 


Smudge Cells  Cancelled   08/19/18  17:27    


 


Toxic Granulation  Cancelled   08/19/18  17:27    


 


Toxic Vacuolation  Cancelled   08/19/18  17:27    


 


Dohle Bodies  Cancelled   08/19/18  17:27    


 


Pelger-Huet Anomaly  Cancelled   08/19/18  17:27    


 


Phillip Rods  Cancelled   08/19/18  17:27    


 


Platelet Estimate  Cancelled   08/19/18  17:27    


 


Clumped Platelets  Cancelled   08/19/18  17:27    


 


Plt Clumps, EDTA  Cancelled   08/19/18  17:27    


 


Large Platelets  Cancelled   08/19/18  17:27    


 


Giant Platelets  Cancelled   08/19/18  17:27    


 


Platelet Satelliting  Cancelled   08/19/18  17:27    


 


Plt Morphology Comment  Cancelled   08/19/18  17:27    


 


RBC Morphology  Cancelled   08/19/18  17:27    


 


Dimorphic RBCs  Cancelled   08/19/18  17:27    


 


Polychromasia  Cancelled   08/19/18  17:27    


 


Hypochromasia  Cancelled   08/19/18  17:27    


 


Poikilocytosis  Cancelled   08/19/18  17:27    


 


Basophilic Stippling  Cancelled   08/19/18  17:27    


 


Anisocytosis  Cancelled   08/19/18  17:27    


 


Microcytosis  Cancelled   08/19/18  17:27    


 


Macrocytosis  Cancelled   08/19/18  17:27    


 


Spherocytes  Cancelled   08/19/18  17:27    


 


Pappenheimer Bodies  Cancelled   08/19/18  17:27    


 


Sickle Cells  Cancelled   08/19/18  17:27    


 


Target Cells  Cancelled   08/19/18  17:27    


 


Tear Drop Cells  Cancelled   08/19/18  17:27    


 


Ovalocytes  Cancelled   08/19/18  17:27    


 


Stomatocytes  Cancelled   08/19/18  17:27    


 


Helmet Cells  Cancelled   08/19/18  17:27    


 


Burns-Jolly Bodies  Cancelled   08/19/18  17:27    


 


Cabot Rings  Cancelled   08/19/18  17:27    


 


Tamy Cells  Cancelled   08/19/18  17:27    


 


Bite Cells  Cancelled   08/19/18  17:27    


 


Crenated Cell  Cancelled   08/19/18  17:27    


 


Elliptocytes  Cancelled   08/19/18  17:27    


 


Acanthocytes (Spur)  Cancelled   08/19/18  17:27    


 


Rouleaux  Cancelled   08/19/18  17:27    


 


Hemoglobin C Crystals  Cancelled   08/19/18  17:27    


 


Schistocytes  Cancelled   08/19/18  17:27    


 


Malaria parasites  Cancelled   08/19/18  17:27    


 


Abhishek Bodies  Cancelled   08/19/18  17:27    


 


Hem Pathologist Commnt  Cancelled   08/19/18  17:27    


 


Sodium  135 mmol/L (137-145)  L  08/25/18  05:43    


 


Potassium  3.7 mmol/L (3.6-5.0)   08/25/18  05:43    


 


Chloride  99.9 mmol/L ()   08/25/18  05:43    


 


Carbon Dioxide  24 mmol/L (22-30)   08/25/18  05:43    


 


Anion Gap  15 mmol/L  08/25/18  05:43    


 


BUN  2 mg/dL (9-20)  L  08/25/18  05:43    


 


Creatinine  0.6 mg/dL (0.8-1.5)  L  08/25/18  05:43    


 


Estimated GFR  > 60 ml/min  08/25/18  05:43    


 


BUN/Creatinine Ratio  3 %  08/25/18  05:43    


 


Glucose  105 mg/dL ()  H  08/25/18  05:43    


 


POC Glucose  115  ()  H  09/04/18  23:08    


 


Calcium  9.0 mg/dL (8.4-10.2)   08/25/18  05:43    


 


Magnesium  2.20 mg/dL (1.7-2.3)   08/19/18  17:24    


 


Total Bilirubin  1.20 mg/dL (0.1-1.2)   08/19/18  17:27    


 


AST  50 units/L (5-40)  H  08/19/18  17:27    


 


ALT  63 units/L (7-56)  H  08/19/18  17:27    


 


Alkaline Phosphatase  67 units/L ()   08/19/18  17:27    


 


Ammonia  42.0 umol/L (25-60)   08/25/18  15:17    


 


Total Creatine Kinase  148 units/L ()   08/19/18  17:44    


 


CK-MB (CK-2)  1.6 ng/mL (0.0-4.0)   08/19/18  17:44    


 


CK-MB (CK-2) Rel Index  1.0  (0-4)   08/19/18  17:44    


 


Troponin T  < 0.010 ng/mL (0.00-0.029)   08/19/18  17:43    


 


Total Protein  7.2 g/dL (6.3-8.2)   08/19/18  17:27    


 


Albumin  3.6 g/dL (3.9-5)  L  08/19/18  17:27    


 


Albumin/Globulin Ratio  1.0 %  08/19/18  17:27    


 


Vitamin B12  870.0 pg/mL (211-911)   08/25/18  15:13    


 


Folate  6.55 ng/mL (7.3-26.0)  L  08/25/18  15:15    


 


TSH  2.910 mlU/mL (0.270-4.200)   08/25/18  15:16    


 


Free T4  0.92 ng/dL (0.76-1.46)   08/19/18  17:27    


 


Urine Color  Shamika  (Yellow)   08/19/18  17:59    


 


Urine Turbidity  Cloudy  (Clear)   08/19/18  17:59    


 


Urine pH  7.0  (5.0-7.0)   08/19/18  17:59    


 


Ur Specific Gravity  1.021  (1.003-1.030)   08/19/18  17:59    


 


Urine Protein  30 mg/dl mg/dL (Negative)   08/19/18  17:59    


 


Urine Glucose (UA)  Neg mg/dL (Negative)   08/19/18  17:59    


 


Urine Ketones  Neg mg/dL (Negative)   08/19/18  17:59    


 


Urine Blood  Neg  (Negative)   08/19/18  17:59    


 


Urine Nitrite  Neg  (Negative)   08/19/18  17:59    


 


Urine Bilirubin  Neg  (Negative)   08/19/18  17:59    


 


Urine Urobilinogen  4.0 mg/dL (<2.0)   08/19/18  17:59    


 


Ur Leukocyte Esterase  Lg  (Negative)   08/19/18  17:59    


 


Urine WBC (Auto)  92.0 /HPF (0.0-6.0)  H  08/19/18  17:59    


 


Urine RBC (Auto)  6.0 /HPF (0.0-6.0)   08/19/18  17:59    


 


U Epithel Cells (Auto)  < 1.0 /HPF (0-13.0)   08/19/18  17:59    


 


Urine Bacteria (Auto)  3+ /HPF (Negative)   08/19/18  17:59    


 


Ur Transition Epith Cell  2 /HPF  08/19/18  17:59    


 


Urine Mucus  1+ /HPF  08/19/18  17:59    


 


Salicylates  < 0.3 mg/dL (2.8-20.0)  L  08/19/18  17:27    


 


Urine Opiates Screen  Presumptive negative   08/19/18  17:59    


 


Urine Methadone Screen  Presumptive negative   08/19/18  17:59    


 


Acetaminophen  < 5.0 ug/mL (10.0-30.0)  L  08/19/18  17:27    


 


Ur Barbiturates Screen  Presumptive negative   08/19/18  17:59    


 


Ur Phencyclidine Scrn  Presumptive negative   08/19/18  17:59    


 


Ur Amphetamines Screen  Presumptive negative   08/19/18  17:59    


 


U Benzodiazepines Scrn  Presumptive negative   08/19/18  17:59    


 


Urine Cocaine Screen  Presumptive negative   08/19/18  17:59    


 


U Marijuana (THC) Screen  Presumptive negative   08/19/18  17:59    


 


Drugs of Abuse Note  Disclamer   08/19/18  17:59    


 


Plasma/Serum Alcohol  < 0.01 % (0-0.07)   08/19/18  17:27

## 2018-09-15 RX ADMIN — HEPARIN SODIUM SCH UNIT: 5000 INJECTION, SOLUTION INTRAVENOUS; SUBCUTANEOUS at 08:52

## 2018-09-15 RX ADMIN — HEPARIN SODIUM SCH: 5000 INJECTION, SOLUTION INTRAVENOUS; SUBCUTANEOUS at 09:46

## 2018-09-15 RX ADMIN — HEPARIN SODIUM SCH UNIT: 5000 INJECTION, SOLUTION INTRAVENOUS; SUBCUTANEOUS at 22:01

## 2018-09-15 NOTE — PROGRESS NOTE
Assessment and Plan


Assessment and plan: 





58-year-old -American male came from mental health facility for altered 

mental status


Catatonia, Psychiatry r/o catatonia, said the side effect of his pysch 

medications


- mental health consulted, and signed off


- Patient is not catatonic


Metabolic encephalopathy


- Supportive care


- EEG showed moderate diffuse encephalopathy


- CT normal finding, MRI showed encephalopathy


- Patient markedly improved


UTI


- Treated with IV antibiotics and resolved


Failure to eat/malnutrition


- patient is on pured diet


Suspected femoral fracture


-Evaluated by orthopedics, reviewed his imaging and showed no fracture


DVT prophylaxis


Disposition


- Patient is unfunded, but patient need SNF


- Patient is stable enough to discharge to SNF.








History


Interval history: 





Patient was seen and evaluated this morning, no nursing issues overnight.  

Patient said he is feeling amandeep. Patient had physical therapy yesterday and he 

walked with assistance by PT. His mentation markedly improved.





Hospitalist Physical





- Physical exam


Narrative exam: 





 Not in cardiopulmonary distress. 


 The patient appeared well nourished and normally developed.


 Vital signs as documented.


 Head exam is unremarkable.


 No scleral icterus .


 Neck is without jugular venous distension, thyromegaly, or carotid bruits. 


 Lungs are clear to auscultation.


Cardiac exam reveals regular rate and  Rhythm. First and second heart sounds 

normal. No murmurs, rubs or gallops. 


Abdominal exam reveals normal bowel sounds, no masses, no organomegaly and no 

aortic enlargement. 


Extremities are nonedematous and both femoral and pedal pulses are normal.


CNS: Patient was alert and oriented X1.





- Constitutional


Vitals: 


 











Temp Pulse Resp BP Pulse Ox


 


 97.9 F   68   16   105/58   98 


 


 09/15/18 05:25  09/15/18 05:25  09/15/18 05:25  09/15/18 05:25  09/15/18 05:25











General appearance: Present: no acute distress, well-nourished, other





Results





- Labs


CBC & Chem 7: 


 08/25/18 05:43





 08/25/18 05:43


Labs: 


 Laboratory Last Values











WBC  5.9 K/mm3 (4.5-11.0)   08/25/18  05:43    


 


RBC  4.40 M/mm3 (3.65-5.03)   08/25/18  05:43    


 


Hgb  12.4 gm/dl (11.8-15.2)   08/25/18  05:43    


 


Hct  35.1 % (35.5-45.6)  L  08/25/18  05:43    


 


MCV  80 fl (84-94)  L  08/25/18  05:43    


 


MCH  28 pg (28-32)   08/25/18  05:43    


 


MCHC  35 % (32-34)  H  08/25/18  05:43    


 


RDW  14.5 % (13.2-15.2)   08/25/18  05:43    


 


Plt Count  197 K/mm3 (140-440)   08/25/18  05:43    


 


Lymph % (Auto)  15.1 % (13.4-35.0)   08/25/18  05:43    


 


Mono % (Auto)  8.1 % (0.0-7.3)  H  08/25/18  05:43    


 


Eos % (Auto)  2.2 % (0.0-4.3)   08/25/18  05:43    


 


Baso % (Auto)  0.4 % (0.0-1.8)   08/25/18  05:43    


 


Lymph #  0.9 K/mm3 (1.2-5.4)  L  08/25/18  05:43    


 


Mono #  0.5 K/mm3 (0.0-0.8)   08/25/18  05:43    


 


Eos #  0.1 K/mm3 (0.0-0.4)   08/25/18  05:43    


 


Baso #  0.0 K/mm3 (0.0-0.1)   08/25/18  05:43    


 


Total Counted  Cancelled   08/19/18  17:27    


 


Seg Neutrophils %  74.2 % (40.0-70.0)  H  08/25/18  05:43    


 


Seg Neuts % (Manual)  Cancelled   08/19/18  17:27    


 


Band Neutrophils %  Cancelled   08/19/18  17:27    


 


Lymphocytes % (Manual)  Cancelled   08/19/18  17:27    


 


Reactive Lymphs % (Man)  Cancelled   08/19/18  17:27    


 


Monocytes % (Manual)  Cancelled   08/19/18  17:27    


 


Eosinophils % (Manual)  Cancelled   08/19/18  17:27    


 


Basophils % (Manual)  Cancelled   08/19/18  17:27    


 


Metamyelocytes %  Cancelled   08/19/18  17:27    


 


Myelocytes %  Cancelled   08/19/18  17:27    


 


Promyelocytes %  Cancelled   08/19/18  17:27    


 


Blast Cells %  Cancelled   08/19/18  17:27    


 


Nucleated RBC %  Cancelled   08/19/18  17:27    


 


Seg Neutrophils #  4.4 K/mm3 (1.8-7.7)   08/25/18  05:43    


 


Seg Neutrophils # Man  Cancelled   08/19/18  17:27    


 


Band Neutrophils #  Cancelled   08/19/18  17:27    


 


Lymphocytes # (Manual)  Cancelled   08/19/18  17:27    


 


Abs React Lymphs (Man)  Cancelled   08/19/18  17:27    


 


Monocytes # (Manual)  Cancelled   08/19/18  17:27    


 


Eosinophils # (Manual)  Cancelled   08/19/18  17:27    


 


Basophils # (Manual)  Cancelled   08/19/18  17:27    


 


Metamyelocytes #  Cancelled   08/19/18  17:27    


 


Myelocytes #  Cancelled   08/19/18  17:27    


 


Promyelocytes #  Cancelled   08/19/18  17:27    


 


Blast Cells #  Cancelled   08/19/18  17:27    


 


WBC Morphology  Cancelled   08/19/18  17:27    


 


Hypersegmented Neuts  Cancelled   08/19/18  17:27    


 


Hyposegmented Neuts  Cancelled   08/19/18  17:27    


 


Hypogranular Neuts  Cancelled   08/19/18  17:27    


 


Hypersegmented Polys  Cancelled   08/19/18  17:27    


 


Smudge Cells  Cancelled   08/19/18  17:27    


 


Toxic Granulation  Cancelled   08/19/18  17:27    


 


Toxic Vacuolation  Cancelled   08/19/18  17:27    


 


Dohle Bodies  Cancelled   08/19/18  17:27    


 


Pelger-Huet Anomaly  Cancelled   08/19/18  17:27    


 


Phillip Rods  Cancelled   08/19/18  17:27    


 


Platelet Estimate  Cancelled   08/19/18  17:27    


 


Clumped Platelets  Cancelled   08/19/18  17:27    


 


Plt Clumps, EDTA  Cancelled   08/19/18  17:27    


 


Large Platelets  Cancelled   08/19/18  17:27    


 


Giant Platelets  Cancelled   08/19/18  17:27    


 


Platelet Satelliting  Cancelled   08/19/18  17:27    


 


Plt Morphology Comment  Cancelled   08/19/18  17:27    


 


RBC Morphology  Cancelled   08/19/18  17:27    


 


Dimorphic RBCs  Cancelled   08/19/18  17:27    


 


Polychromasia  Cancelled   08/19/18  17:27    


 


Hypochromasia  Cancelled   08/19/18  17:27    


 


Poikilocytosis  Cancelled   08/19/18  17:27    


 


Basophilic Stippling  Cancelled   08/19/18  17:27    


 


Anisocytosis  Cancelled   08/19/18  17:27    


 


Microcytosis  Cancelled   08/19/18  17:27    


 


Macrocytosis  Cancelled   08/19/18  17:27    


 


Spherocytes  Cancelled   08/19/18  17:27    


 


Pappenheimer Bodies  Cancelled   08/19/18  17:27    


 


Sickle Cells  Cancelled   08/19/18  17:27    


 


Target Cells  Cancelled   08/19/18  17:27    


 


Tear Drop Cells  Cancelled   08/19/18  17:27    


 


Ovalocytes  Cancelled   08/19/18  17:27    


 


Stomatocytes  Cancelled   08/19/18  17:27    


 


Helmet Cells  Cancelled   08/19/18  17:27    


 


Burns-Jolly Bodies  Cancelled   08/19/18  17:27    


 


Cabot Rings  Cancelled   08/19/18  17:27    


 


Tamy Cells  Cancelled   08/19/18  17:27    


 


Bite Cells  Cancelled   08/19/18  17:27    


 


Crenated Cell  Cancelled   08/19/18  17:27    


 


Elliptocytes  Cancelled   08/19/18  17:27    


 


Acanthocytes (Spur)  Cancelled   08/19/18  17:27    


 


Rouleaux  Cancelled   08/19/18  17:27    


 


Hemoglobin C Crystals  Cancelled   08/19/18  17:27    


 


Schistocytes  Cancelled   08/19/18  17:27    


 


Malaria parasites  Cancelled   08/19/18  17:27    


 


Abhishek Bodies  Cancelled   08/19/18  17:27    


 


Hem Pathologist Commnt  Cancelled   08/19/18  17:27    


 


Sodium  135 mmol/L (137-145)  L  08/25/18  05:43    


 


Potassium  3.7 mmol/L (3.6-5.0)   08/25/18  05:43    


 


Chloride  99.9 mmol/L ()   08/25/18  05:43    


 


Carbon Dioxide  24 mmol/L (22-30)   08/25/18  05:43    


 


Anion Gap  15 mmol/L  08/25/18  05:43    


 


BUN  2 mg/dL (9-20)  L  08/25/18  05:43    


 


Creatinine  0.6 mg/dL (0.8-1.5)  L  08/25/18  05:43    


 


Estimated GFR  > 60 ml/min  08/25/18  05:43    


 


BUN/Creatinine Ratio  3 %  08/25/18  05:43    


 


Glucose  105 mg/dL ()  H  08/25/18  05:43    


 


POC Glucose  115  ()  H  09/04/18  23:08    


 


Calcium  9.0 mg/dL (8.4-10.2)   08/25/18  05:43    


 


Magnesium  2.20 mg/dL (1.7-2.3)   08/19/18  17:24    


 


Total Bilirubin  1.20 mg/dL (0.1-1.2)   08/19/18  17:27    


 


AST  50 units/L (5-40)  H  08/19/18  17:27    


 


ALT  63 units/L (7-56)  H  08/19/18  17:27    


 


Alkaline Phosphatase  67 units/L ()   08/19/18  17:27    


 


Ammonia  42.0 umol/L (25-60)   08/25/18  15:17    


 


Total Creatine Kinase  148 units/L ()   08/19/18  17:44    


 


CK-MB (CK-2)  1.6 ng/mL (0.0-4.0)   08/19/18  17:44    


 


CK-MB (CK-2) Rel Index  1.0  (0-4)   08/19/18  17:44    


 


Troponin T  < 0.010 ng/mL (0.00-0.029)   08/19/18  17:43    


 


Total Protein  7.2 g/dL (6.3-8.2)   08/19/18  17:27    


 


Albumin  3.6 g/dL (3.9-5)  L  08/19/18  17:27    


 


Albumin/Globulin Ratio  1.0 %  08/19/18  17:27    


 


Vitamin B12  870.0 pg/mL (211-911)   08/25/18  15:13    


 


Folate  6.55 ng/mL (7.3-26.0)  L  08/25/18  15:15    


 


TSH  2.910 mlU/mL (0.270-4.200)   08/25/18  15:16    


 


Free T4  0.92 ng/dL (0.76-1.46)   08/19/18  17:27    


 


Urine Color  Shamika  (Yellow)   08/19/18  17:59    


 


Urine Turbidity  Cloudy  (Clear)   08/19/18  17:59    


 


Urine pH  7.0  (5.0-7.0)   08/19/18  17:59    


 


Ur Specific Gravity  1.021  (1.003-1.030)   08/19/18  17:59    


 


Urine Protein  30 mg/dl mg/dL (Negative)   08/19/18  17:59    


 


Urine Glucose (UA)  Neg mg/dL (Negative)   08/19/18  17:59    


 


Urine Ketones  Neg mg/dL (Negative)   08/19/18  17:59    


 


Urine Blood  Neg  (Negative)   08/19/18  17:59    


 


Urine Nitrite  Neg  (Negative)   08/19/18  17:59    


 


Urine Bilirubin  Neg  (Negative)   08/19/18  17:59    


 


Urine Urobilinogen  4.0 mg/dL (<2.0)   08/19/18  17:59    


 


Ur Leukocyte Esterase  Lg  (Negative)   08/19/18  17:59    


 


Urine WBC (Auto)  92.0 /HPF (0.0-6.0)  H  08/19/18  17:59    


 


Urine RBC (Auto)  6.0 /HPF (0.0-6.0)   08/19/18  17:59    


 


U Epithel Cells (Auto)  < 1.0 /HPF (0-13.0)   08/19/18  17:59    


 


Urine Bacteria (Auto)  3+ /HPF (Negative)   08/19/18  17:59    


 


Ur Transition Epith Cell  2 /HPF  08/19/18  17:59    


 


Urine Mucus  1+ /HPF  08/19/18  17:59    


 


Salicylates  < 0.3 mg/dL (2.8-20.0)  L  08/19/18  17:27    


 


Urine Opiates Screen  Presumptive negative   08/19/18  17:59    


 


Urine Methadone Screen  Presumptive negative   08/19/18  17:59    


 


Acetaminophen  < 5.0 ug/mL (10.0-30.0)  L  08/19/18  17:27    


 


Ur Barbiturates Screen  Presumptive negative   08/19/18  17:59    


 


Ur Phencyclidine Scrn  Presumptive negative   08/19/18  17:59    


 


Ur Amphetamines Screen  Presumptive negative   08/19/18  17:59    


 


U Benzodiazepines Scrn  Presumptive negative   08/19/18  17:59    


 


Urine Cocaine Screen  Presumptive negative   08/19/18  17:59    


 


U Marijuana (THC) Screen  Presumptive negative   08/19/18  17:59    


 


Drugs of Abuse Note  Disclamer   08/19/18  17:59    


 


Plasma/Serum Alcohol  < 0.01 % (0-0.07)   08/19/18  17:27

## 2018-09-16 RX ADMIN — LORAZEPAM PRN MG: 2 INJECTION INTRAMUSCULAR; INTRAVENOUS at 11:08

## 2018-09-16 RX ADMIN — HEPARIN SODIUM SCH UNIT: 5000 INJECTION, SOLUTION INTRAVENOUS; SUBCUTANEOUS at 21:25

## 2018-09-16 RX ADMIN — HEPARIN SODIUM SCH UNIT: 5000 INJECTION, SOLUTION INTRAVENOUS; SUBCUTANEOUS at 10:04

## 2018-09-16 NOTE — PROGRESS NOTE
Assessment and Plan


Assessment and plan: 





58-year-old -American male came from mental health facility for altered 

mental status


Catatonia, Psychiatry r/o catatonia, said the side effect of his pysch 

medications


- mental health consulted, and signed off


- Patient is not catatonic


Metabolic encephalopathy


- Supportive care


- EEG showed moderate diffuse encephalopathy


- CT normal finding, MRI showed encephalopathy


- Patient markedly improved


UTI


- Treated with IV antibiotics and resolved


Failure to eat/malnutrition


- patient is on pured diet


Suspected femoral fracture


-Evaluated by orthopedics, reviewed his imaging and showed no fracture


DVT prophylaxis


Disposition


- Patient is unfunded, but patient need SNF


- Patient is stable enough to discharge to SNF.








History


Interval history: 





Patient was seen and evaluated this morning, no nursing issues overnight.  

Patient said he is feeling amandeep. Patient had physical therapy and he walked 

with assistance by PT. His mentation markedly improved.





Hospitalist Physical





- Physical exam


Narrative exam: 





 Not in cardiopulmonary distress. 


 The patient appeared well nourished and normally developed.


 Vital signs as documented.


 Head exam is unremarkable.


 No scleral icterus .


 Neck is without jugular venous distension, thyromegaly, or carotid bruits. 


 Lungs are clear to auscultation.


Cardiac exam reveals regular rate and  Rhythm. First and second heart sounds 

normal. No murmurs, rubs or gallops. 


Abdominal exam reveals normal bowel sounds, no masses, no organomegaly and no 

aortic enlargement. 


Extremities are nonedematous and both femoral and pedal pulses are normal.


CNS: Patient was alert and oriented X1.





- Constitutional


Vitals: 


 











Temp Pulse Resp BP Pulse Ox


 


 97.9 F   68   16   105/58   98 


 


 09/15/18 05:25  09/15/18 05:25  09/15/18 05:25  09/15/18 05:25  09/15/18 22:00











General appearance: Present: no acute distress, well-nourished, other





Results





- Labs


CBC & Chem 7: 


 08/25/18 05:43





 08/25/18 05:43


Labs: 


 Laboratory Last Values











WBC  5.9 K/mm3 (4.5-11.0)   08/25/18  05:43    


 


RBC  4.40 M/mm3 (3.65-5.03)   08/25/18  05:43    


 


Hgb  12.4 gm/dl (11.8-15.2)   08/25/18  05:43    


 


Hct  35.1 % (35.5-45.6)  L  08/25/18  05:43    


 


MCV  80 fl (84-94)  L  08/25/18  05:43    


 


MCH  28 pg (28-32)   08/25/18  05:43    


 


MCHC  35 % (32-34)  H  08/25/18  05:43    


 


RDW  14.5 % (13.2-15.2)   08/25/18  05:43    


 


Plt Count  197 K/mm3 (140-440)   08/25/18  05:43    


 


Lymph % (Auto)  15.1 % (13.4-35.0)   08/25/18  05:43    


 


Mono % (Auto)  8.1 % (0.0-7.3)  H  08/25/18  05:43    


 


Eos % (Auto)  2.2 % (0.0-4.3)   08/25/18  05:43    


 


Baso % (Auto)  0.4 % (0.0-1.8)   08/25/18  05:43    


 


Lymph #  0.9 K/mm3 (1.2-5.4)  L  08/25/18  05:43    


 


Mono #  0.5 K/mm3 (0.0-0.8)   08/25/18  05:43    


 


Eos #  0.1 K/mm3 (0.0-0.4)   08/25/18  05:43    


 


Baso #  0.0 K/mm3 (0.0-0.1)   08/25/18  05:43    


 


Total Counted  Cancelled   08/19/18  17:27    


 


Seg Neutrophils %  74.2 % (40.0-70.0)  H  08/25/18  05:43    


 


Seg Neuts % (Manual)  Cancelled   08/19/18  17:27    


 


Band Neutrophils %  Cancelled   08/19/18  17:27    


 


Lymphocytes % (Manual)  Cancelled   08/19/18  17:27    


 


Reactive Lymphs % (Man)  Cancelled   08/19/18  17:27    


 


Monocytes % (Manual)  Cancelled   08/19/18  17:27    


 


Eosinophils % (Manual)  Cancelled   08/19/18  17:27    


 


Basophils % (Manual)  Cancelled   08/19/18  17:27    


 


Metamyelocytes %  Cancelled   08/19/18  17:27    


 


Myelocytes %  Cancelled   08/19/18  17:27    


 


Promyelocytes %  Cancelled   08/19/18  17:27    


 


Blast Cells %  Cancelled   08/19/18  17:27    


 


Nucleated RBC %  Cancelled   08/19/18  17:27    


 


Seg Neutrophils #  4.4 K/mm3 (1.8-7.7)   08/25/18  05:43    


 


Seg Neutrophils # Man  Cancelled   08/19/18  17:27    


 


Band Neutrophils #  Cancelled   08/19/18  17:27    


 


Lymphocytes # (Manual)  Cancelled   08/19/18  17:27    


 


Abs React Lymphs (Man)  Cancelled   08/19/18  17:27    


 


Monocytes # (Manual)  Cancelled   08/19/18  17:27    


 


Eosinophils # (Manual)  Cancelled   08/19/18  17:27    


 


Basophils # (Manual)  Cancelled   08/19/18  17:27    


 


Metamyelocytes #  Cancelled   08/19/18  17:27    


 


Myelocytes #  Cancelled   08/19/18  17:27    


 


Promyelocytes #  Cancelled   08/19/18  17:27    


 


Blast Cells #  Cancelled   08/19/18  17:27    


 


WBC Morphology  Cancelled   08/19/18  17:27    


 


Hypersegmented Neuts  Cancelled   08/19/18  17:27    


 


Hyposegmented Neuts  Cancelled   08/19/18  17:27    


 


Hypogranular Neuts  Cancelled   08/19/18  17:27    


 


Hypersegmented Polys  Cancelled   08/19/18  17:27    


 


Smudge Cells  Cancelled   08/19/18  17:27    


 


Toxic Granulation  Cancelled   08/19/18  17:27    


 


Toxic Vacuolation  Cancelled   08/19/18  17:27    


 


Dohle Bodies  Cancelled   08/19/18  17:27    


 


Pelger-Huet Anomaly  Cancelled   08/19/18  17:27    


 


Phillip Rods  Cancelled   08/19/18  17:27    


 


Platelet Estimate  Cancelled   08/19/18  17:27    


 


Clumped Platelets  Cancelled   08/19/18  17:27    


 


Plt Clumps, EDTA  Cancelled   08/19/18  17:27    


 


Large Platelets  Cancelled   08/19/18  17:27    


 


Giant Platelets  Cancelled   08/19/18  17:27    


 


Platelet Satelliting  Cancelled   08/19/18  17:27    


 


Plt Morphology Comment  Cancelled   08/19/18  17:27    


 


RBC Morphology  Cancelled   08/19/18  17:27    


 


Dimorphic RBCs  Cancelled   08/19/18  17:27    


 


Polychromasia  Cancelled   08/19/18  17:27    


 


Hypochromasia  Cancelled   08/19/18  17:27    


 


Poikilocytosis  Cancelled   08/19/18  17:27    


 


Basophilic Stippling  Cancelled   08/19/18  17:27    


 


Anisocytosis  Cancelled   08/19/18  17:27    


 


Microcytosis  Cancelled   08/19/18  17:27    


 


Macrocytosis  Cancelled   08/19/18  17:27    


 


Spherocytes  Cancelled   08/19/18  17:27    


 


Pappenheimer Bodies  Cancelled   08/19/18  17:27    


 


Sickle Cells  Cancelled   08/19/18  17:27    


 


Target Cells  Cancelled   08/19/18  17:27    


 


Tear Drop Cells  Cancelled   08/19/18  17:27    


 


Ovalocytes  Cancelled   08/19/18  17:27    


 


Stomatocytes  Cancelled   08/19/18  17:27    


 


Helmet Cells  Cancelled   08/19/18  17:27    


 


Burns-Jolly Bodies  Cancelled   08/19/18  17:27    


 


Cabot Rings  Cancelled   08/19/18  17:27    


 


Hinton Cells  Cancelled   08/19/18  17:27    


 


Bite Cells  Cancelled   08/19/18  17:27    


 


Crenated Cell  Cancelled   08/19/18  17:27    


 


Elliptocytes  Cancelled   08/19/18  17:27    


 


Acanthocytes (Spur)  Cancelled   08/19/18  17:27    


 


Rouleaux  Cancelled   08/19/18  17:27    


 


Hemoglobin C Crystals  Cancelled   08/19/18  17:27    


 


Schistocytes  Cancelled   08/19/18  17:27    


 


Malaria parasites  Cancelled   08/19/18  17:27    


 


Abhishek Bodies  Cancelled   08/19/18  17:27    


 


Hem Pathologist Commnt  Cancelled   08/19/18  17:27    


 


Sodium  135 mmol/L (137-145)  L  08/25/18  05:43    


 


Potassium  3.7 mmol/L (3.6-5.0)   08/25/18  05:43    


 


Chloride  99.9 mmol/L ()   08/25/18  05:43    


 


Carbon Dioxide  24 mmol/L (22-30)   08/25/18  05:43    


 


Anion Gap  15 mmol/L  08/25/18  05:43    


 


BUN  2 mg/dL (9-20)  L  08/25/18  05:43    


 


Creatinine  0.6 mg/dL (0.8-1.5)  L  08/25/18  05:43    


 


Estimated GFR  > 60 ml/min  08/25/18  05:43    


 


BUN/Creatinine Ratio  3 %  08/25/18  05:43    


 


Glucose  105 mg/dL ()  H  08/25/18  05:43    


 


POC Glucose  115  ()  H  09/04/18  23:08    


 


Calcium  9.0 mg/dL (8.4-10.2)   08/25/18  05:43    


 


Magnesium  2.20 mg/dL (1.7-2.3)   08/19/18  17:24    


 


Total Bilirubin  1.20 mg/dL (0.1-1.2)   08/19/18  17:27    


 


AST  50 units/L (5-40)  H  08/19/18  17:27    


 


ALT  63 units/L (7-56)  H  08/19/18  17:27    


 


Alkaline Phosphatase  67 units/L ()   08/19/18  17:27    


 


Ammonia  42.0 umol/L (25-60)   08/25/18  15:17    


 


Total Creatine Kinase  148 units/L ()   08/19/18  17:44    


 


CK-MB (CK-2)  1.6 ng/mL (0.0-4.0)   08/19/18  17:44    


 


CK-MB (CK-2) Rel Index  1.0  (0-4)   08/19/18  17:44    


 


Troponin T  < 0.010 ng/mL (0.00-0.029)   08/19/18  17:43    


 


Total Protein  7.2 g/dL (6.3-8.2)   08/19/18  17:27    


 


Albumin  3.6 g/dL (3.9-5)  L  08/19/18  17:27    


 


Albumin/Globulin Ratio  1.0 %  08/19/18  17:27    


 


Vitamin B12  870.0 pg/mL (211-911)   08/25/18  15:13    


 


Folate  6.55 ng/mL (7.3-26.0)  L  08/25/18  15:15    


 


TSH  2.910 mlU/mL (0.270-4.200)   08/25/18  15:16    


 


Free T4  0.92 ng/dL (0.76-1.46)   08/19/18  17:27    


 


Urine Color  Shamika  (Yellow)   08/19/18  17:59    


 


Urine Turbidity  Cloudy  (Clear)   08/19/18  17:59    


 


Urine pH  7.0  (5.0-7.0)   08/19/18  17:59    


 


Ur Specific Gravity  1.021  (1.003-1.030)   08/19/18  17:59    


 


Urine Protein  30 mg/dl mg/dL (Negative)   08/19/18  17:59    


 


Urine Glucose (UA)  Neg mg/dL (Negative)   08/19/18  17:59    


 


Urine Ketones  Neg mg/dL (Negative)   08/19/18  17:59    


 


Urine Blood  Neg  (Negative)   08/19/18  17:59    


 


Urine Nitrite  Neg  (Negative)   08/19/18  17:59    


 


Urine Bilirubin  Neg  (Negative)   08/19/18  17:59    


 


Urine Urobilinogen  4.0 mg/dL (<2.0)   08/19/18  17:59    


 


Ur Leukocyte Esterase  Lg  (Negative)   08/19/18  17:59    


 


Urine WBC (Auto)  92.0 /HPF (0.0-6.0)  H  08/19/18  17:59    


 


Urine RBC (Auto)  6.0 /HPF (0.0-6.0)   08/19/18  17:59    


 


U Epithel Cells (Auto)  < 1.0 /HPF (0-13.0)   08/19/18  17:59    


 


Urine Bacteria (Auto)  3+ /HPF (Negative)   08/19/18  17:59    


 


Ur Transition Epith Cell  2 /HPF  08/19/18  17:59    


 


Urine Mucus  1+ /HPF  08/19/18  17:59    


 


Salicylates  < 0.3 mg/dL (2.8-20.0)  L  08/19/18  17:27    


 


Urine Opiates Screen  Presumptive negative   08/19/18  17:59    


 


Urine Methadone Screen  Presumptive negative   08/19/18  17:59    


 


Acetaminophen  < 5.0 ug/mL (10.0-30.0)  L  08/19/18  17:27    


 


Ur Barbiturates Screen  Presumptive negative   08/19/18  17:59    


 


Ur Phencyclidine Scrn  Presumptive negative   08/19/18  17:59    


 


Ur Amphetamines Screen  Presumptive negative   08/19/18  17:59    


 


U Benzodiazepines Scrn  Presumptive negative   08/19/18  17:59    


 


Urine Cocaine Screen  Presumptive negative   08/19/18  17:59    


 


U Marijuana (THC) Screen  Presumptive negative   08/19/18  17:59    


 


Drugs of Abuse Note  Disclamer   08/19/18  17:59    


 


Plasma/Serum Alcohol  < 0.01 % (0-0.07)   08/19/18  17:27

## 2018-09-17 RX ADMIN — HEPARIN SODIUM SCH UNIT: 5000 INJECTION, SOLUTION INTRAVENOUS; SUBCUTANEOUS at 21:57

## 2018-09-17 RX ADMIN — HEPARIN SODIUM SCH UNIT: 5000 INJECTION, SOLUTION INTRAVENOUS; SUBCUTANEOUS at 09:46

## 2018-09-17 RX ADMIN — LORAZEPAM PRN MG: 2 INJECTION INTRAMUSCULAR; INTRAVENOUS at 10:05

## 2018-09-17 RX ADMIN — LORAZEPAM PRN MG: 2 INJECTION INTRAMUSCULAR; INTRAVENOUS at 21:52

## 2018-09-17 NOTE — PROGRESS NOTE
Assessment and Plan


Assessment and plan: 





58-year-old -American male came from mental health facility for altered 

mental status


Catatonia, Psychiatry r/o catatonia, said the side effect of his pysch 

medications


- mental health consulted, and signed off


- Patient is not catatonic


Metabolic encephalopathy


- Supportive care


- EEG showed moderate diffuse encephalopathy


- CT normal finding, MRI showed encephalopathy


- Patient markedly improved


UTI


- Treated with IV antibiotics and resolved


Failure to eat/malnutrition


- patient is on pured diet


Suspected femoral fracture


-Evaluated by orthopedics, reviewed his imaging and showed no fracture


DVT prophylaxis


Disposition


- Patient is unfunded, but patient need SNF


- Waiting if his sister able to take him home.








History


Interval history: 





Patient was seen and evaluated this morning, no nursing issues overnight.  

Patient starts to talk. patient is on restraints because he is a fall risk.





Hospitalist Physical





- Physical exam


Narrative exam: 





 Not in cardiopulmonary distress. 


 The patient appeared well nourished and normally developed.


 Vital signs as documented.


 Head exam is unremarkable.


 No scleral icterus .


 Neck is without jugular venous distension, thyromegaly, or carotid bruits. 


 Lungs are clear to auscultation.


Cardiac exam reveals regular rate and  Rhythm. First and second heart sounds 

normal. No murmurs, rubs or gallops. 


Abdominal exam reveals normal bowel sounds, no masses, no organomegaly and no 

aortic enlargement. 


Extremities are nonedematous and both femoral and pedal pulses are normal.


CNS: Patient was alert and oriented X1.





- Constitutional


Vitals: 


 











Temp Pulse Resp BP Pulse Ox


 


 98.3 F   100 H  20   119/81   96 


 


 09/17/18 11:31  09/17/18 11:31  09/17/18 11:31  09/17/18 11:31  09/17/18 11:31











General appearance: Present: no acute distress, well-nourished, other





Results





- Labs


CBC & Chem 7: 


 08/25/18 05:43





 08/25/18 05:43


Labs: 


 Laboratory Last Values











WBC  5.9 K/mm3 (4.5-11.0)   08/25/18  05:43    


 


RBC  4.40 M/mm3 (3.65-5.03)   08/25/18  05:43    


 


Hgb  12.4 gm/dl (11.8-15.2)   08/25/18  05:43    


 


Hct  35.1 % (35.5-45.6)  L  08/25/18  05:43    


 


MCV  80 fl (84-94)  L  08/25/18  05:43    


 


MCH  28 pg (28-32)   08/25/18  05:43    


 


MCHC  35 % (32-34)  H  08/25/18  05:43    


 


RDW  14.5 % (13.2-15.2)   08/25/18  05:43    


 


Plt Count  197 K/mm3 (140-440)   08/25/18  05:43    


 


Lymph % (Auto)  15.1 % (13.4-35.0)   08/25/18  05:43    


 


Mono % (Auto)  8.1 % (0.0-7.3)  H  08/25/18  05:43    


 


Eos % (Auto)  2.2 % (0.0-4.3)   08/25/18  05:43    


 


Baso % (Auto)  0.4 % (0.0-1.8)   08/25/18  05:43    


 


Lymph #  0.9 K/mm3 (1.2-5.4)  L  08/25/18  05:43    


 


Mono #  0.5 K/mm3 (0.0-0.8)   08/25/18  05:43    


 


Eos #  0.1 K/mm3 (0.0-0.4)   08/25/18  05:43    


 


Baso #  0.0 K/mm3 (0.0-0.1)   08/25/18  05:43    


 


Total Counted  Cancelled   08/19/18  17:27    


 


Seg Neutrophils %  74.2 % (40.0-70.0)  H  08/25/18  05:43    


 


Seg Neuts % (Manual)  Cancelled   08/19/18  17:27    


 


Band Neutrophils %  Cancelled   08/19/18  17:27    


 


Lymphocytes % (Manual)  Cancelled   08/19/18  17:27    


 


Reactive Lymphs % (Man)  Cancelled   08/19/18  17:27    


 


Monocytes % (Manual)  Cancelled   08/19/18  17:27    


 


Eosinophils % (Manual)  Cancelled   08/19/18  17:27    


 


Basophils % (Manual)  Cancelled   08/19/18  17:27    


 


Metamyelocytes %  Cancelled   08/19/18  17:27    


 


Myelocytes %  Cancelled   08/19/18  17:27    


 


Promyelocytes %  Cancelled   08/19/18  17:27    


 


Blast Cells %  Cancelled   08/19/18  17:27    


 


Nucleated RBC %  Cancelled   08/19/18  17:27    


 


Seg Neutrophils #  4.4 K/mm3 (1.8-7.7)   08/25/18  05:43    


 


Seg Neutrophils # Man  Cancelled   08/19/18  17:27    


 


Band Neutrophils #  Cancelled   08/19/18  17:27    


 


Lymphocytes # (Manual)  Cancelled   08/19/18  17:27    


 


Abs React Lymphs (Man)  Cancelled   08/19/18  17:27    


 


Monocytes # (Manual)  Cancelled   08/19/18  17:27    


 


Eosinophils # (Manual)  Cancelled   08/19/18  17:27    


 


Basophils # (Manual)  Cancelled   08/19/18  17:27    


 


Metamyelocytes #  Cancelled   08/19/18  17:27    


 


Myelocytes #  Cancelled   08/19/18  17:27    


 


Promyelocytes #  Cancelled   08/19/18  17:27    


 


Blast Cells #  Cancelled   08/19/18  17:27    


 


WBC Morphology  Cancelled   08/19/18  17:27    


 


Hypersegmented Neuts  Cancelled   08/19/18  17:27    


 


Hyposegmented Neuts  Cancelled   08/19/18  17:27    


 


Hypogranular Neuts  Cancelled   08/19/18  17:27    


 


Hypersegmented Polys  Cancelled   08/19/18  17:27    


 


Smudge Cells  Cancelled   08/19/18  17:27    


 


Toxic Granulation  Cancelled   08/19/18  17:27    


 


Toxic Vacuolation  Cancelled   08/19/18  17:27    


 


Dohle Bodies  Cancelled   08/19/18  17:27    


 


Pelger-Huet Anomaly  Cancelled   08/19/18  17:27    


 


Phillip Rods  Cancelled   08/19/18  17:27    


 


Platelet Estimate  Cancelled   08/19/18  17:27    


 


Clumped Platelets  Cancelled   08/19/18  17:27    


 


Plt Clumps, EDTA  Cancelled   08/19/18  17:27    


 


Large Platelets  Cancelled   08/19/18  17:27    


 


Giant Platelets  Cancelled   08/19/18  17:27    


 


Platelet Satelliting  Cancelled   08/19/18  17:27    


 


Plt Morphology Comment  Cancelled   08/19/18  17:27    


 


RBC Morphology  Cancelled   08/19/18  17:27    


 


Dimorphic RBCs  Cancelled   08/19/18  17:27    


 


Polychromasia  Cancelled   08/19/18  17:27    


 


Hypochromasia  Cancelled   08/19/18  17:27    


 


Poikilocytosis  Cancelled   08/19/18  17:27    


 


Basophilic Stippling  Cancelled   08/19/18  17:27    


 


Anisocytosis  Cancelled   08/19/18  17:27    


 


Microcytosis  Cancelled   08/19/18  17:27    


 


Macrocytosis  Cancelled   08/19/18  17:27    


 


Spherocytes  Cancelled   08/19/18  17:27    


 


Pappenheimer Bodies  Cancelled   08/19/18  17:27    


 


Sickle Cells  Cancelled   08/19/18  17:27    


 


Target Cells  Cancelled   08/19/18  17:27    


 


Tear Drop Cells  Cancelled   08/19/18  17:27    


 


Ovalocytes  Cancelled   08/19/18  17:27    


 


Stomatocytes  Cancelled   08/19/18  17:27    


 


Helmet Cells  Cancelled   08/19/18  17:27    


 


Burns-Jolly Bodies  Cancelled   08/19/18  17:27    


 


Cabot Rings  Cancelled   08/19/18  17:27    


 


Imperial Cells  Cancelled   08/19/18  17:27    


 


Bite Cells  Cancelled   08/19/18  17:27    


 


Crenated Cell  Cancelled   08/19/18  17:27    


 


Elliptocytes  Cancelled   08/19/18  17:27    


 


Acanthocytes (Spur)  Cancelled   08/19/18  17:27    


 


Rouleaux  Cancelled   08/19/18  17:27    


 


Hemoglobin C Crystals  Cancelled   08/19/18  17:27    


 


Schistocytes  Cancelled   08/19/18  17:27    


 


Malaria parasites  Cancelled   08/19/18  17:27    


 


Abhishek Bodies  Cancelled   08/19/18  17:27    


 


Hem Pathologist Commnt  Cancelled   08/19/18  17:27    


 


Sodium  135 mmol/L (137-145)  L  08/25/18  05:43    


 


Potassium  3.7 mmol/L (3.6-5.0)   08/25/18  05:43    


 


Chloride  99.9 mmol/L ()   08/25/18  05:43    


 


Carbon Dioxide  24 mmol/L (22-30)   08/25/18  05:43    


 


Anion Gap  15 mmol/L  08/25/18  05:43    


 


BUN  2 mg/dL (9-20)  L  08/25/18  05:43    


 


Creatinine  0.6 mg/dL (0.8-1.5)  L  08/25/18  05:43    


 


Estimated GFR  > 60 ml/min  08/25/18  05:43    


 


BUN/Creatinine Ratio  3 %  08/25/18  05:43    


 


Glucose  105 mg/dL ()  H  08/25/18  05:43    


 


POC Glucose  115  ()  H  09/04/18  23:08    


 


Calcium  9.0 mg/dL (8.4-10.2)   08/25/18  05:43    


 


Magnesium  2.20 mg/dL (1.7-2.3)   08/19/18  17:24    


 


Total Bilirubin  1.20 mg/dL (0.1-1.2)   08/19/18  17:27    


 


AST  50 units/L (5-40)  H  08/19/18  17:27    


 


ALT  63 units/L (7-56)  H  08/19/18  17:27    


 


Alkaline Phosphatase  67 units/L ()   08/19/18  17:27    


 


Ammonia  42.0 umol/L (25-60)   08/25/18  15:17    


 


Total Creatine Kinase  148 units/L ()   08/19/18  17:44    


 


CK-MB (CK-2)  1.6 ng/mL (0.0-4.0)   08/19/18  17:44    


 


CK-MB (CK-2) Rel Index  1.0  (0-4)   08/19/18  17:44    


 


Troponin T  < 0.010 ng/mL (0.00-0.029)   08/19/18  17:43    


 


Total Protein  7.2 g/dL (6.3-8.2)   08/19/18  17:27    


 


Albumin  3.6 g/dL (3.9-5)  L  08/19/18  17:27    


 


Albumin/Globulin Ratio  1.0 %  08/19/18  17:27    


 


Vitamin B12  870.0 pg/mL (211-911)   08/25/18  15:13    


 


Folate  6.55 ng/mL (7.3-26.0)  L  08/25/18  15:15    


 


TSH  2.910 mlU/mL (0.270-4.200)   08/25/18  15:16    


 


Free T4  0.92 ng/dL (0.76-1.46)   08/19/18  17:27    


 


Urine Color  Shamika  (Yellow)   08/19/18  17:59    


 


Urine Turbidity  Cloudy  (Clear)   08/19/18  17:59    


 


Urine pH  7.0  (5.0-7.0)   08/19/18  17:59    


 


Ur Specific Gravity  1.021  (1.003-1.030)   08/19/18  17:59    


 


Urine Protein  30 mg/dl mg/dL (Negative)   08/19/18  17:59    


 


Urine Glucose (UA)  Neg mg/dL (Negative)   08/19/18  17:59    


 


Urine Ketones  Neg mg/dL (Negative)   08/19/18  17:59    


 


Urine Blood  Neg  (Negative)   08/19/18  17:59    


 


Urine Nitrite  Neg  (Negative)   08/19/18  17:59    


 


Urine Bilirubin  Neg  (Negative)   08/19/18  17:59    


 


Urine Urobilinogen  4.0 mg/dL (<2.0)   08/19/18  17:59    


 


Ur Leukocyte Esterase  Lg  (Negative)   08/19/18  17:59    


 


Urine WBC (Auto)  92.0 /HPF (0.0-6.0)  H  08/19/18  17:59    


 


Urine RBC (Auto)  6.0 /HPF (0.0-6.0)   08/19/18  17:59    


 


U Epithel Cells (Auto)  < 1.0 /HPF (0-13.0)   08/19/18  17:59    


 


Urine Bacteria (Auto)  3+ /HPF (Negative)   08/19/18  17:59    


 


Ur Transition Epith Cell  2 /HPF  08/19/18  17:59    


 


Urine Mucus  1+ /HPF  08/19/18  17:59    


 


Salicylates  < 0.3 mg/dL (2.8-20.0)  L  08/19/18  17:27    


 


Urine Opiates Screen  Presumptive negative   08/19/18  17:59    


 


Urine Methadone Screen  Presumptive negative   08/19/18  17:59    


 


Acetaminophen  < 5.0 ug/mL (10.0-30.0)  L  08/19/18  17:27    


 


Ur Barbiturates Screen  Presumptive negative   08/19/18  17:59    


 


Ur Phencyclidine Scrn  Presumptive negative   08/19/18  17:59    


 


Ur Amphetamines Screen  Presumptive negative   08/19/18  17:59    


 


U Benzodiazepines Scrn  Presumptive negative   08/19/18  17:59    


 


Urine Cocaine Screen  Presumptive negative   08/19/18  17:59    


 


U Marijuana (THC) Screen  Presumptive negative   08/19/18  17:59    


 


Drugs of Abuse Note  Disclamer   08/19/18  17:59    


 


Plasma/Serum Alcohol  < 0.01 % (0-0.07)   08/19/18  17:27

## 2018-09-18 RX ADMIN — HEPARIN SODIUM SCH UNIT: 5000 INJECTION, SOLUTION INTRAVENOUS; SUBCUTANEOUS at 10:46

## 2018-09-18 RX ADMIN — ARIPIPRAZOLE SCH MG: 10 TABLET ORAL at 12:49

## 2018-09-18 RX ADMIN — HEPARIN SODIUM SCH UNIT: 5000 INJECTION, SOLUTION INTRAVENOUS; SUBCUTANEOUS at 21:50

## 2018-09-18 RX ADMIN — MIRTAZAPINE SCH MG: 15 TABLET, ORALLY DISINTEGRATING ORAL at 21:50

## 2018-09-18 RX ADMIN — LORAZEPAM PRN MG: 2 INJECTION INTRAMUSCULAR; INTRAVENOUS at 21:54

## 2018-09-18 NOTE — DISCHARGE SUMMARY
Providers





- Providers


Date of Admission: 


08/19/18 23:18





Attending physician: 


SAHLI FERNANDEZ MD





 





08/19/18 20:48


Consult to Physician [CONS] Urgent 


   Comment: Dr. Boudreaux spoke with Dr. Wayne


   Consulting Provider: SAMMY WAYNE


   Physician Instructions: 


   Reason For Exam: right lessor trochantor avulson fracture





08/20/18 07:15


Consult to Mental Health [CONS] Routine 


   Reason For Exam: catatonic state


   Place consult to:: mental health


   Notified:: EWA


   Phone number called:: [phone]


   Was contact made?: Yes


   If yes, spoke with:: EWA


   Time called:: 08:48





08/21/18 09:42


Consult to Physician [CONS] Routine 


   Comment: 


   Consulting Provider: NARENDRA HYMAN


   Physician Instructions: 


   Reason For Exam: altered mental status





08/24/18 18:25


Consult to Physician [CONS] Routine 


   Comment: 


   Consulting Provider: SAMMY WAYNE


   Physician Instructions: 


   Reason For Exam: hip fx





08/25/18 14:53


Consult to Dietitian/Nutrition [CONS] Routine 


   Physician Instructions: 


   Reason For Exam: 


   Reason for Consult: Write/Manage Tube Feeding





08/26/18 13:21


Occupational Therapy Evaluate and Treat [CONS] Routine 


   Comment: 


   Reason For Exam: orthotic for foot drop





08/29/18 19:59


Consult to Ethics Committee [CONS] Routine 


   Reason For Exam: feeding tube in stomack. OK to start tube feed.





08/31/18 07:07


Speech Therapy Evaluation and Treat [CONS] Routine 


   Reason For Exam: evaluate swallowing





09/04/18 14:10


Physical Therapy Evaluation and Treat [CONS] Routine 


   Comment: 


   Reason For Exam: gen weakness





09/07/18 10:52


Consult to Dietitian/Nutrition [CONS] Routine 


   Physician Instructions: 


   Reason For Exam: 


   Reason for Consult: Write/Manage Tube Feeding





09/10/18 09:33


Occupational Therapy Evaluate and Treat [CONS] Routine 


   Comment: 


   Reason For Exam: generalized weakness


Physical Therapy Evaluation and Treat [CONS] Routine 


   Comment: 


   Reason For Exam: gneralized weakness





09/11/18 17:23


Speech Therapy Evaluation and Treat [CONS] Urgent 


   Reason For Exam: To reassess for swollowing





09/13/18 16:47


Physical Therapy Evaluation and Treat [CONS] Routine 


   Comment: 


   Reason For Exam: generalized weakness











Primary care physician: 


PRIMARY CARE MD








Hospitalization


Reason for admission: ACUTE PYSCHOSIS


Condition: Stable


Hospital course: 


58-year-old -American male came from mental health facility for altered 

mental status Catatonia, Psychiatry r/o catatonia, said the side effect of his 

pysch medications during hospitalization multiple etiologies and research well 

unable to provide the reason for patient's state of health it was felt by 

psychiatry that the antipsychotic medication the patient was on a recent 

bladder was likely the cause patient was discontinued on all medications on my 

taking over on 09/18/2018 I did restart some of the patient's home medication 

and reconsult as psych S patient continued to be restrained and was barely 

responsive on my very first day.  The patient improved 2 days later and on the 

third day was able to speak clearly to the nursing staff actually asking the 

MRSA staph out of the date.  On the day of discharge the patient's family 

member resented uncalled another family member on the phone who reported that 

the patient has been mismanaged by medications in the past and does not want 

the patient on any antipsychotic except trazodone if needed.  I did advise but 

the patient has been on restrained to which they said that the patient every 

weekend when they come by that the patient is able to ambulate with minimal 

assistance and does not need to be on restraints but has had a change of 

condition since medications were started.  They also reported that they would 

not be able to take the patient home due to a death in the family.  I was 

notified by nurses case management of the patient can go to a personal care 

home to have accepted the patient I have asked if the nursing of the care home 

has seen the patient recently and was told to the affirmative yes patient was 

discharged to the care of home with recommendation to continue physical therapy 

and ultimately get back to baseline.  Patient was discharged on lactulose as 

someone level was mildly elevated all the medications were discontinued.


The EKG at that time was moderately diffused CT and MRI were unremarkable.  

Patient was treated for UTI.  Also failure to eat severe malnutrition.  Initial 

Concern of Fracture Which Was Reviewed by  Orthopedic Surgeon and stated no 

fracture was noted








Metabolic encephalopathy


UTI


Failure to eat


Severe protein calorie malnutrition


Failure to thrive








Disposition: DC-01 TO HOME OR SELFCARE


Time spent for discharge: 35 mins





Core Measure Documentation





- Palliative Care


Palliative Care/ Comfort Measures: Not Applicable





- Core Measures


Any of the following diagnoses?: none





- VTE Discharge Requirements


Deep Vein Thrombosis/Pulmonary Embolism Present on Admission: No





Exam





- Physical Exam


Narrative exam: 


Not in cardiopulmonary distress. 


 The patient appeared Markedly chronically ill AND EMACIATED


 Vital signs as documented.


 Head exam is with marked temporal wasting


 No scleral icterus .


 Neck is without jugular venous distension, thyromegaly, or carotid bruits. 


 Lungs are clear to auscultation.


Cardiac exam reveals regular rate and  Rhythm. First and second heart sounds 

normal. No murmurs, rubs or gallops. 


Abdominal exam reveals normal bowel sounds, no masses, no organomegaly and no 

aortic enlargement. 


Extremities are nonedematous and both femoral and pedal pulses are normal. on 

restraints


CNS: Patient was alert and oriented X2.











- Constitutional


Vitals: 


 











Temp Pulse Resp BP Pulse Ox


 


 97.7 F   88   20   104/78   97 


 


 09/18/18 09:30  09/18/18 09:30  09/18/18 00:00  09/18/18 09:30  09/18/18 09:30














Plan


Activity: advance as tolerated, fall precautions


Diet: advance as tolerated


Special Instructions: record daily BP diary


Additional Instructions: outpatient psych follow up


Follow up with: 


PRIMARY CARE,MD [Primary Care Provider] - 3-5 Days


EDWINA CORTEZ MD [Staff Physician] - 7 Days


Prescriptions: 


Lactulose [Cephulac] 20 gm PO Q8H #30 oral.liqd

## 2018-09-18 NOTE — PROGRESS NOTE
Assessment and Plan


Assessment and plan: 


58-year-old -American male came from mental health facility for altered 

mental status


Catatonia, Psychiatry r/o catatonia, said the side effect of his pysch 

medications


- mental health consulted, and signed off


- Patient is not catatonic


- Restart Medications.


Metabolic encephalopathy


- Supportive care


- EEG showed moderate diffuse encephalopathy


- CT normal finding, MRI showed encephalopathy


- Patient markedly improved


UTI


- Treated with IV antibiotics and resolved


Failure to eat/malnutrition


- patient is on pured diet


Suspected femoral fracture


-Evaluated by orthopedics, reviewed his imaging and showed no fracture


DVT prophylaxis


Disposition


- Patient is unfunded, but patient need SNF


- Waiting if his sister able to take him home.











History


Interval history: 


Patient seen and examined, no clinical changes, noticed patient not on his home 

meds will restart. 








Hospitalist Physical





- Physical exam


Narrative exam: 


Not in cardiopulmonary distress. 


 The patient appeared well nourished and normally developed.


 Vital signs as documented.


 Head exam is with marked temporal wasting


 No scleral icterus .


 Neck is without jugular venous distension, thyromegaly, or carotid bruits. 


 Lungs are clear to auscultation.


Cardiac exam reveals regular rate and  Rhythm. First and second heart sounds 

normal. No murmurs, rubs or gallops. 


Abdominal exam reveals normal bowel sounds, no masses, no organomegaly and no 

aortic enlargement. 


Extremities are nonedematous and both femoral and pedal pulses are normal. on 

restriants


CNS: Patient was alert and oriented X1.











- Constitutional


Vitals: 


 











Temp Pulse Resp BP Pulse Ox


 


 97.7 F   88   20   104/78   97 


 


 09/18/18 09:30  09/18/18 09:30  09/18/18 00:00  09/18/18 09:30  09/18/18 09:30











General appearance: Present: no acute distress, well-nourished, other





Results





- Labs


CBC & Chem 7: 


 08/25/18 05:43





 08/25/18 05:43


Labs: 


 Laboratory Last Values











WBC  5.9 K/mm3 (4.5-11.0)   08/25/18  05:43    


 


RBC  4.40 M/mm3 (3.65-5.03)   08/25/18  05:43    


 


Hgb  12.4 gm/dl (11.8-15.2)   08/25/18  05:43    


 


Hct  35.1 % (35.5-45.6)  L  08/25/18  05:43    


 


MCV  80 fl (84-94)  L  08/25/18  05:43    


 


MCH  28 pg (28-32)   08/25/18  05:43    


 


MCHC  35 % (32-34)  H  08/25/18  05:43    


 


RDW  14.5 % (13.2-15.2)   08/25/18  05:43    


 


Plt Count  197 K/mm3 (140-440)   08/25/18  05:43    


 


Lymph % (Auto)  15.1 % (13.4-35.0)   08/25/18  05:43    


 


Mono % (Auto)  8.1 % (0.0-7.3)  H  08/25/18  05:43    


 


Eos % (Auto)  2.2 % (0.0-4.3)   08/25/18  05:43    


 


Baso % (Auto)  0.4 % (0.0-1.8)   08/25/18  05:43    


 


Lymph #  0.9 K/mm3 (1.2-5.4)  L  08/25/18  05:43    


 


Mono #  0.5 K/mm3 (0.0-0.8)   08/25/18  05:43    


 


Eos #  0.1 K/mm3 (0.0-0.4)   08/25/18  05:43    


 


Baso #  0.0 K/mm3 (0.0-0.1)   08/25/18  05:43    


 


Total Counted  Cancelled   08/19/18  17:27    


 


Seg Neutrophils %  74.2 % (40.0-70.0)  H  08/25/18  05:43    


 


Seg Neuts % (Manual)  Cancelled   08/19/18  17:27    


 


Band Neutrophils %  Cancelled   08/19/18  17:27    


 


Lymphocytes % (Manual)  Cancelled   08/19/18  17:27    


 


Reactive Lymphs % (Man)  Cancelled   08/19/18  17:27    


 


Monocytes % (Manual)  Cancelled   08/19/18  17:27    


 


Eosinophils % (Manual)  Cancelled   08/19/18  17:27    


 


Basophils % (Manual)  Cancelled   08/19/18  17:27    


 


Metamyelocytes %  Cancelled   08/19/18  17:27    


 


Myelocytes %  Cancelled   08/19/18  17:27    


 


Promyelocytes %  Cancelled   08/19/18  17:27    


 


Blast Cells %  Cancelled   08/19/18  17:27    


 


Nucleated RBC %  Cancelled   08/19/18  17:27    


 


Seg Neutrophils #  4.4 K/mm3 (1.8-7.7)   08/25/18  05:43    


 


Seg Neutrophils # Man  Cancelled   08/19/18  17:27    


 


Band Neutrophils #  Cancelled   08/19/18  17:27    


 


Lymphocytes # (Manual)  Cancelled   08/19/18  17:27    


 


Abs React Lymphs (Man)  Cancelled   08/19/18  17:27    


 


Monocytes # (Manual)  Cancelled   08/19/18  17:27    


 


Eosinophils # (Manual)  Cancelled   08/19/18  17:27    


 


Basophils # (Manual)  Cancelled   08/19/18  17:27    


 


Metamyelocytes #  Cancelled   08/19/18  17:27    


 


Myelocytes #  Cancelled   08/19/18  17:27    


 


Promyelocytes #  Cancelled   08/19/18  17:27    


 


Blast Cells #  Cancelled   08/19/18  17:27    


 


WBC Morphology  Cancelled   08/19/18  17:27    


 


Hypersegmented Neuts  Cancelled   08/19/18  17:27    


 


Hyposegmented Neuts  Cancelled   08/19/18  17:27    


 


Hypogranular Neuts  Cancelled   08/19/18  17:27    


 


Hypersegmented Polys  Cancelled   08/19/18  17:27    


 


Smudge Cells  Cancelled   08/19/18  17:27    


 


Toxic Granulation  Cancelled   08/19/18  17:27    


 


Toxic Vacuolation  Cancelled   08/19/18  17:27    


 


Dohle Bodies  Cancelled   08/19/18  17:27    


 


Pelger-Huet Anomaly  Cancelled   08/19/18  17:27    


 


Phillip Rods  Cancelled   08/19/18  17:27    


 


Platelet Estimate  Cancelled   08/19/18  17:27    


 


Clumped Platelets  Cancelled   08/19/18  17:27    


 


Plt Clumps, EDTA  Cancelled   08/19/18  17:27    


 


Large Platelets  Cancelled   08/19/18  17:27    


 


Giant Platelets  Cancelled   08/19/18  17:27    


 


Platelet Satelliting  Cancelled   08/19/18  17:27    


 


Plt Morphology Comment  Cancelled   08/19/18  17:27    


 


RBC Morphology  Cancelled   08/19/18  17:27    


 


Dimorphic RBCs  Cancelled   08/19/18  17:27    


 


Polychromasia  Cancelled   08/19/18  17:27    


 


Hypochromasia  Cancelled   08/19/18  17:27    


 


Poikilocytosis  Cancelled   08/19/18  17:27    


 


Basophilic Stippling  Cancelled   08/19/18  17:27    


 


Anisocytosis  Cancelled   08/19/18  17:27    


 


Microcytosis  Cancelled   08/19/18  17:27    


 


Macrocytosis  Cancelled   08/19/18  17:27    


 


Spherocytes  Cancelled   08/19/18  17:27    


 


Pappenheimer Bodies  Cancelled   08/19/18  17:27    


 


Sickle Cells  Cancelled   08/19/18  17:27    


 


Target Cells  Cancelled   08/19/18  17:27    


 


Tear Drop Cells  Cancelled   08/19/18  17:27    


 


Ovalocytes  Cancelled   08/19/18  17:27    


 


Stomatocytes  Cancelled   08/19/18  17:27    


 


Helmet Cells  Cancelled   08/19/18  17:27    


 


Burns-Jolly Bodies  Cancelled   08/19/18  17:27    


 


Cabot Rings  Cancelled   08/19/18  17:27    


 


Tamy Cells  Cancelled   08/19/18  17:27    


 


Bite Cells  Cancelled   08/19/18  17:27    


 


Crenated Cell  Cancelled   08/19/18  17:27    


 


Elliptocytes  Cancelled   08/19/18  17:27    


 


Acanthocytes (Spur)  Cancelled   08/19/18  17:27    


 


Rouleaux  Cancelled   08/19/18  17:27    


 


Hemoglobin C Crystals  Cancelled   08/19/18  17:27    


 


Schistocytes  Cancelled   08/19/18  17:27    


 


Malaria parasites  Cancelled   08/19/18  17:27    


 


Abhishek Bodies  Cancelled   08/19/18  17:27    


 


Hem Pathologist Commnt  Cancelled   08/19/18  17:27    


 


Sodium  135 mmol/L (137-145)  L  08/25/18  05:43    


 


Potassium  3.7 mmol/L (3.6-5.0)   08/25/18  05:43    


 


Chloride  99.9 mmol/L ()   08/25/18  05:43    


 


Carbon Dioxide  24 mmol/L (22-30)   08/25/18  05:43    


 


Anion Gap  15 mmol/L  08/25/18  05:43    


 


BUN  2 mg/dL (9-20)  L  08/25/18  05:43    


 


Creatinine  0.6 mg/dL (0.8-1.5)  L  08/25/18  05:43    


 


Estimated GFR  > 60 ml/min  08/25/18  05:43    


 


BUN/Creatinine Ratio  3 %  08/25/18  05:43    


 


Glucose  105 mg/dL ()  H  08/25/18  05:43    


 


POC Glucose  115  ()  H  09/04/18  23:08    


 


Calcium  9.0 mg/dL (8.4-10.2)   08/25/18  05:43    


 


Magnesium  2.20 mg/dL (1.7-2.3)   08/19/18  17:24    


 


Total Bilirubin  1.20 mg/dL (0.1-1.2)   08/19/18  17:27    


 


AST  50 units/L (5-40)  H  08/19/18  17:27    


 


ALT  63 units/L (7-56)  H  08/19/18  17:27    


 


Alkaline Phosphatase  67 units/L ()   08/19/18  17:27    


 


Ammonia  42.0 umol/L (25-60)   08/25/18  15:17    


 


Total Creatine Kinase  148 units/L ()   08/19/18  17:44    


 


CK-MB (CK-2)  1.6 ng/mL (0.0-4.0)   08/19/18  17:44    


 


CK-MB (CK-2) Rel Index  1.0  (0-4)   08/19/18  17:44    


 


Troponin T  < 0.010 ng/mL (0.00-0.029)   08/19/18  17:43    


 


Total Protein  7.2 g/dL (6.3-8.2)   08/19/18  17:27    


 


Albumin  3.6 g/dL (3.9-5)  L  08/19/18  17:27    


 


Albumin/Globulin Ratio  1.0 %  08/19/18  17:27    


 


Vitamin B12  870.0 pg/mL (211-911)   08/25/18  15:13    


 


Folate  6.55 ng/mL (7.3-26.0)  L  08/25/18  15:15    


 


TSH  2.910 mlU/mL (0.270-4.200)   08/25/18  15:16    


 


Free T4  0.92 ng/dL (0.76-1.46)   08/19/18  17:27    


 


Urine Color  Shamika  (Yellow)   08/19/18  17:59    


 


Urine Turbidity  Cloudy  (Clear)   08/19/18  17:59    


 


Urine pH  7.0  (5.0-7.0)   08/19/18  17:59    


 


Ur Specific Gravity  1.021  (1.003-1.030)   08/19/18  17:59    


 


Urine Protein  30 mg/dl mg/dL (Negative)   08/19/18  17:59    


 


Urine Glucose (UA)  Neg mg/dL (Negative)   08/19/18  17:59    


 


Urine Ketones  Neg mg/dL (Negative)   08/19/18  17:59    


 


Urine Blood  Neg  (Negative)   08/19/18  17:59    


 


Urine Nitrite  Neg  (Negative)   08/19/18  17:59    


 


Urine Bilirubin  Neg  (Negative)   08/19/18  17:59    


 


Urine Urobilinogen  4.0 mg/dL (<2.0)   08/19/18  17:59    


 


Ur Leukocyte Esterase  Lg  (Negative)   08/19/18  17:59    


 


Urine WBC (Auto)  92.0 /HPF (0.0-6.0)  H  08/19/18  17:59    


 


Urine RBC (Auto)  6.0 /HPF (0.0-6.0)   08/19/18  17:59    


 


U Epithel Cells (Auto)  < 1.0 /HPF (0-13.0)   08/19/18  17:59    


 


Urine Bacteria (Auto)  3+ /HPF (Negative)   08/19/18  17:59    


 


Ur Transition Epith Cell  2 /HPF  08/19/18  17:59    


 


Urine Mucus  1+ /HPF  08/19/18  17:59    


 


Salicylates  < 0.3 mg/dL (2.8-20.0)  L  08/19/18  17:27    


 


Urine Opiates Screen  Presumptive negative   08/19/18  17:59    


 


Urine Methadone Screen  Presumptive negative   08/19/18  17:59    


 


Acetaminophen  < 5.0 ug/mL (10.0-30.0)  L  08/19/18  17:27    


 


Ur Barbiturates Screen  Presumptive negative   08/19/18  17:59    


 


Ur Phencyclidine Scrn  Presumptive negative   08/19/18  17:59    


 


Ur Amphetamines Screen  Presumptive negative   08/19/18  17:59    


 


U Benzodiazepines Scrn  Presumptive negative   08/19/18  17:59    


 


Urine Cocaine Screen  Presumptive negative   08/19/18  17:59    


 


U Marijuana (THC) Screen  Presumptive negative   08/19/18  17:59    


 


Drugs of Abuse Note  Disclamer   08/19/18  17:59    


 


Plasma/Serum Alcohol  < 0.01 % (0-0.07)   08/19/18  17:27

## 2018-09-19 RX ADMIN — HEPARIN SODIUM SCH UNIT: 5000 INJECTION, SOLUTION INTRAVENOUS; SUBCUTANEOUS at 21:15

## 2018-09-19 RX ADMIN — ARIPIPRAZOLE SCH MG: 10 TABLET ORAL at 11:05

## 2018-09-19 RX ADMIN — HEPARIN SODIUM SCH UNIT: 5000 INJECTION, SOLUTION INTRAVENOUS; SUBCUTANEOUS at 11:05

## 2018-09-19 RX ADMIN — MIRTAZAPINE SCH MG: 15 TABLET, ORALLY DISINTEGRATING ORAL at 21:16

## 2018-09-19 NOTE — PROGRESS NOTE
Assessment and Plan


Assessment and plan: 


58-year-old -American male came from mental health facility for altered 

mental status


Catatonia, Psychiatry r/o catatonia, said the side effect of his pysch 

medications


- mental health consulted, and signed off


- Patient is not catatonic


- Restart Medication


- Reconsult PSYCH


Metabolic encephalopathy


- Supportive care


- EEG showed moderate diffuse encephalopathy


- CT normal finding, MRI showed encephalopathy


- Patient markedly improved


UTI


- Treated with IV antibiotics and resolved


- Cultures grew citrobacter , patietn completed abx


Failure to eat/malnutrition


- patient is on pured diet


Suspected femoral fracture


-Evaluated by orthopedics, reviewed his imaging and showed no fracture


DVT prophylaxis


Disposition


- Patient is unfunded, but patient need SNF


- Waiting if his sister able to take him home.











History


Interval history: 


Patient seen and examined, no clinical changes, still with intermittent 

agitation requiring safety restriants








Hospitalist Physical





- Physical exam


Narrative exam: 


Not in cardiopulmonary distress. 


 The patient appeared well nourished and normally developed.


 Vital signs as documented.


 Head exam is with marked temporal wasting


 No scleral icterus .


 Neck is without jugular venous distension, thyromegaly, or carotid bruits. 


 Lungs are clear to auscultation.


Cardiac exam reveals regular rate and  Rhythm. First and second heart sounds 

normal. No murmurs, rubs or gallops. 


Abdominal exam reveals normal bowel sounds, no masses, no organomegaly and no 

aortic enlargement. 


Extremities are nonedematous and both femoral and pedal pulses are normal. on 

restriants


CNS: Patient was alert and oriented X1.











- Constitutional


Vitals: 


 











Temp Pulse Resp BP Pulse Ox


 


 98.2 F   99 H  18   110/77   99 


 


 09/19/18 11:22  09/19/18 11:22  09/19/18 11:22  09/19/18 11:22  09/19/18 11:22











General appearance: Present: no acute distress, well-nourished, other





Results





- Labs


CBC & Chem 7: 


 08/25/18 05:43





 08/25/18 05:43


Labs: 


 Laboratory Last Values











WBC  5.9 K/mm3 (4.5-11.0)   08/25/18  05:43    


 


RBC  4.40 M/mm3 (3.65-5.03)   08/25/18  05:43    


 


Hgb  12.4 gm/dl (11.8-15.2)   08/25/18  05:43    


 


Hct  35.1 % (35.5-45.6)  L  08/25/18  05:43    


 


MCV  80 fl (84-94)  L  08/25/18  05:43    


 


MCH  28 pg (28-32)   08/25/18  05:43    


 


MCHC  35 % (32-34)  H  08/25/18  05:43    


 


RDW  14.5 % (13.2-15.2)   08/25/18  05:43    


 


Plt Count  197 K/mm3 (140-440)   08/25/18  05:43    


 


Lymph % (Auto)  15.1 % (13.4-35.0)   08/25/18  05:43    


 


Mono % (Auto)  8.1 % (0.0-7.3)  H  08/25/18  05:43    


 


Eos % (Auto)  2.2 % (0.0-4.3)   08/25/18  05:43    


 


Baso % (Auto)  0.4 % (0.0-1.8)   08/25/18  05:43    


 


Lymph #  0.9 K/mm3 (1.2-5.4)  L  08/25/18  05:43    


 


Mono #  0.5 K/mm3 (0.0-0.8)   08/25/18  05:43    


 


Eos #  0.1 K/mm3 (0.0-0.4)   08/25/18  05:43    


 


Baso #  0.0 K/mm3 (0.0-0.1)   08/25/18  05:43    


 


Total Counted  Cancelled   08/19/18  17:27    


 


Seg Neutrophils %  74.2 % (40.0-70.0)  H  08/25/18  05:43    


 


Seg Neuts % (Manual)  Cancelled   08/19/18  17:27    


 


Band Neutrophils %  Cancelled   08/19/18  17:27    


 


Lymphocytes % (Manual)  Cancelled   08/19/18  17:27    


 


Reactive Lymphs % (Man)  Cancelled   08/19/18  17:27    


 


Monocytes % (Manual)  Cancelled   08/19/18  17:27    


 


Eosinophils % (Manual)  Cancelled   08/19/18  17:27    


 


Basophils % (Manual)  Cancelled   08/19/18  17:27    


 


Metamyelocytes %  Cancelled   08/19/18  17:27    


 


Myelocytes %  Cancelled   08/19/18  17:27    


 


Promyelocytes %  Cancelled   08/19/18  17:27    


 


Blast Cells %  Cancelled   08/19/18  17:27    


 


Nucleated RBC %  Cancelled   08/19/18  17:27    


 


Seg Neutrophils #  4.4 K/mm3 (1.8-7.7)   08/25/18  05:43    


 


Seg Neutrophils # Man  Cancelled   08/19/18  17:27    


 


Band Neutrophils #  Cancelled   08/19/18  17:27    


 


Lymphocytes # (Manual)  Cancelled   08/19/18  17:27    


 


Abs React Lymphs (Man)  Cancelled   08/19/18  17:27    


 


Monocytes # (Manual)  Cancelled   08/19/18  17:27    


 


Eosinophils # (Manual)  Cancelled   08/19/18  17:27    


 


Basophils # (Manual)  Cancelled   08/19/18  17:27    


 


Metamyelocytes #  Cancelled   08/19/18  17:27    


 


Myelocytes #  Cancelled   08/19/18  17:27    


 


Promyelocytes #  Cancelled   08/19/18  17:27    


 


Blast Cells #  Cancelled   08/19/18  17:27    


 


WBC Morphology  Cancelled   08/19/18  17:27    


 


Hypersegmented Neuts  Cancelled   08/19/18  17:27    


 


Hyposegmented Neuts  Cancelled   08/19/18  17:27    


 


Hypogranular Neuts  Cancelled   08/19/18  17:27    


 


Hypersegmented Polys  Cancelled   08/19/18  17:27    


 


Smudge Cells  Cancelled   08/19/18  17:27    


 


Toxic Granulation  Cancelled   08/19/18  17:27    


 


Toxic Vacuolation  Cancelled   08/19/18  17:27    


 


Dohle Bodies  Cancelled   08/19/18  17:27    


 


Pelger-Huet Anomaly  Cancelled   08/19/18  17:27    


 


Phillip Rods  Cancelled   08/19/18  17:27    


 


Platelet Estimate  Cancelled   08/19/18  17:27    


 


Clumped Platelets  Cancelled   08/19/18  17:27    


 


Plt Clumps, EDTA  Cancelled   08/19/18  17:27    


 


Large Platelets  Cancelled   08/19/18  17:27    


 


Giant Platelets  Cancelled   08/19/18  17:27    


 


Platelet Satelliting  Cancelled   08/19/18  17:27    


 


Plt Morphology Comment  Cancelled   08/19/18  17:27    


 


RBC Morphology  Cancelled   08/19/18  17:27    


 


Dimorphic RBCs  Cancelled   08/19/18  17:27    


 


Polychromasia  Cancelled   08/19/18  17:27    


 


Hypochromasia  Cancelled   08/19/18  17:27    


 


Poikilocytosis  Cancelled   08/19/18  17:27    


 


Basophilic Stippling  Cancelled   08/19/18  17:27    


 


Anisocytosis  Cancelled   08/19/18  17:27    


 


Microcytosis  Cancelled   08/19/18  17:27    


 


Macrocytosis  Cancelled   08/19/18  17:27    


 


Spherocytes  Cancelled   08/19/18  17:27    


 


Pappenheimer Bodies  Cancelled   08/19/18  17:27    


 


Sickle Cells  Cancelled   08/19/18  17:27    


 


Target Cells  Cancelled   08/19/18  17:27    


 


Tear Drop Cells  Cancelled   08/19/18  17:27    


 


Ovalocytes  Cancelled   08/19/18  17:27    


 


Stomatocytes  Cancelled   08/19/18  17:27    


 


Helmet Cells  Cancelled   08/19/18  17:27    


 


Burns-Jolly Bodies  Cancelled   08/19/18  17:27    


 


Cabot Rings  Cancelled   08/19/18  17:27    


 


Tamy Cells  Cancelled   08/19/18  17:27    


 


Bite Cells  Cancelled   08/19/18  17:27    


 


Crenated Cell  Cancelled   08/19/18  17:27    


 


Elliptocytes  Cancelled   08/19/18  17:27    


 


Acanthocytes (Spur)  Cancelled   08/19/18  17:27    


 


Rouleaux  Cancelled   08/19/18  17:27    


 


Hemoglobin C Crystals  Cancelled   08/19/18  17:27    


 


Schistocytes  Cancelled   08/19/18  17:27    


 


Malaria parasites  Cancelled   08/19/18  17:27    


 


Abhishek Bodies  Cancelled   08/19/18  17:27    


 


Hem Pathologist Commnt  Cancelled   08/19/18  17:27    


 


Sodium  135 mmol/L (137-145)  L  08/25/18  05:43    


 


Potassium  3.7 mmol/L (3.6-5.0)   08/25/18  05:43    


 


Chloride  99.9 mmol/L ()   08/25/18  05:43    


 


Carbon Dioxide  24 mmol/L (22-30)   08/25/18  05:43    


 


Anion Gap  15 mmol/L  08/25/18  05:43    


 


BUN  2 mg/dL (9-20)  L  08/25/18  05:43    


 


Creatinine  0.6 mg/dL (0.8-1.5)  L  08/25/18  05:43    


 


Estimated GFR  > 60 ml/min  08/25/18  05:43    


 


BUN/Creatinine Ratio  3 %  08/25/18  05:43    


 


Glucose  105 mg/dL ()  H  08/25/18  05:43    


 


POC Glucose  115  ()  H  09/04/18  23:08    


 


Calcium  9.0 mg/dL (8.4-10.2)   08/25/18  05:43    


 


Magnesium  2.20 mg/dL (1.7-2.3)   08/19/18  17:24    


 


Total Bilirubin  1.20 mg/dL (0.1-1.2)   08/19/18  17:27    


 


AST  50 units/L (5-40)  H  08/19/18  17:27    


 


ALT  63 units/L (7-56)  H  08/19/18  17:27    


 


Alkaline Phosphatase  67 units/L ()   08/19/18  17:27    


 


Ammonia  42.0 umol/L (25-60)   08/25/18  15:17    


 


Total Creatine Kinase  148 units/L ()   08/19/18  17:44    


 


CK-MB (CK-2)  1.6 ng/mL (0.0-4.0)   08/19/18  17:44    


 


CK-MB (CK-2) Rel Index  1.0  (0-4)   08/19/18  17:44    


 


Troponin T  < 0.010 ng/mL (0.00-0.029)   08/19/18  17:43    


 


Total Protein  7.2 g/dL (6.3-8.2)   08/19/18  17:27    


 


Albumin  3.6 g/dL (3.9-5)  L  08/19/18  17:27    


 


Albumin/Globulin Ratio  1.0 %  08/19/18  17:27    


 


Vitamin B12  870.0 pg/mL (211-911)   08/25/18  15:13    


 


Folate  6.55 ng/mL (7.3-26.0)  L  08/25/18  15:15    


 


TSH  2.910 mlU/mL (0.270-4.200)   08/25/18  15:16    


 


Free T4  0.92 ng/dL (0.76-1.46)   08/19/18  17:27    


 


Urine Color  Shamika  (Yellow)   08/19/18  17:59    


 


Urine Turbidity  Cloudy  (Clear)   08/19/18  17:59    


 


Urine pH  7.0  (5.0-7.0)   08/19/18  17:59    


 


Ur Specific Gravity  1.021  (1.003-1.030)   08/19/18  17:59    


 


Urine Protein  30 mg/dl mg/dL (Negative)   08/19/18  17:59    


 


Urine Glucose (UA)  Neg mg/dL (Negative)   08/19/18  17:59    


 


Urine Ketones  Neg mg/dL (Negative)   08/19/18  17:59    


 


Urine Blood  Neg  (Negative)   08/19/18  17:59    


 


Urine Nitrite  Neg  (Negative)   08/19/18  17:59    


 


Urine Bilirubin  Neg  (Negative)   08/19/18  17:59    


 


Urine Urobilinogen  4.0 mg/dL (<2.0)   08/19/18  17:59    


 


Ur Leukocyte Esterase  Lg  (Negative)   08/19/18  17:59    


 


Urine WBC (Auto)  92.0 /HPF (0.0-6.0)  H  08/19/18  17:59    


 


Urine RBC (Auto)  6.0 /HPF (0.0-6.0)   08/19/18  17:59    


 


U Epithel Cells (Auto)  < 1.0 /HPF (0-13.0)   08/19/18  17:59    


 


Urine Bacteria (Auto)  3+ /HPF (Negative)   08/19/18  17:59    


 


Ur Transition Epith Cell  2 /HPF  08/19/18  17:59    


 


Urine Mucus  1+ /HPF  08/19/18  17:59    


 


Salicylates  < 0.3 mg/dL (2.8-20.0)  L  08/19/18  17:27    


 


Urine Opiates Screen  Presumptive negative   08/19/18  17:59    


 


Urine Methadone Screen  Presumptive negative   08/19/18  17:59    


 


Acetaminophen  < 5.0 ug/mL (10.0-30.0)  L  08/19/18  17:27    


 


Ur Barbiturates Screen  Presumptive negative   08/19/18  17:59    


 


Ur Phencyclidine Scrn  Presumptive negative   08/19/18  17:59    


 


Ur Amphetamines Screen  Presumptive negative   08/19/18  17:59    


 


U Benzodiazepines Scrn  Presumptive negative   08/19/18  17:59    


 


Urine Cocaine Screen  Presumptive negative   08/19/18  17:59    


 


U Marijuana (THC) Screen  Presumptive negative   08/19/18  17:59    


 


Drugs of Abuse Note  Disclamer   08/19/18  17:59    


 


Plasma/Serum Alcohol  < 0.01 % (0-0.07)   08/19/18  17:27

## 2018-09-20 RX ADMIN — MIRTAZAPINE SCH MG: 15 TABLET, ORALLY DISINTEGRATING ORAL at 21:52

## 2018-09-20 RX ADMIN — HEPARIN SODIUM SCH UNIT: 5000 INJECTION, SOLUTION INTRAVENOUS; SUBCUTANEOUS at 21:53

## 2018-09-20 RX ADMIN — ARIPIPRAZOLE SCH MG: 10 TABLET ORAL at 12:32

## 2018-09-20 RX ADMIN — HEPARIN SODIUM SCH UNIT: 5000 INJECTION, SOLUTION INTRAVENOUS; SUBCUTANEOUS at 14:35

## 2018-09-20 NOTE — PROGRESS NOTE
Assessment and Plan


Assessment and plan: 


58-year-old -American male came from mental health facility for altered 

mental status


Catatonia, Psychiatry r/o catatonia, said the side effect of his pysch 

medications


- mental health consulted, and signed off- BUT RECONSULTED


- Patient is not catatonic


- Restart Medication


- Reconsult PSYCH





Metabolic encephalopathy


- Supportive care


- EEG showed moderate diffuse encephalopathy


- CT normal finding, MRI showed encephalopathy


- Patient markedly improved





UTI


- Treated with IV antibiotics and resolved


- Cultures grew citrobacter , patianais completed abx





Failure to eat/malnutrition


- patient is on pured diet





Suspected femoral fracture


-Evaluated by orthopedics, reviewed his imaging and showed no fracture





DVT prophylaxis


Disposition


- Patient is unfunded, but patient need SNF


- Waiting if his sister able to take him home.











History


Interval history: 


Patient seen and examined, now verbalizing although not meaningful, still 

lethargic. still requiring row belt. 





Hospitalist Physical





- Physical exam


Narrative exam: 


Not in cardiopulmonary distress. 


 The patient appeared well nourished and normally developed.


 Vital signs as documented.


 Head exam is with marked temporal wasting


 No scleral icterus .


 Neck is without jugular venous distension, thyromegaly, or carotid bruits. 


 Lungs are clear to auscultation.


Cardiac exam reveals regular rate and  Rhythm. First and second heart sounds 

normal. No murmurs, rubs or gallops. 


Abdominal exam reveals normal bowel sounds, no masses, no organomegaly and no 

aortic enlargement. 


Extremities are nonedematous and both femoral and pedal pulses are normal. on 

restraints


CNS: Patient was alert and oriented X1.











- Constitutional


Vitals: 


 











Temp Pulse Resp BP Pulse Ox


 


 98.1 F   94 H  18   125/79   99 


 


 09/20/18 04:55  09/20/18 04:55  09/20/18 04:55  09/20/18 04:55  09/20/18 04:55











General appearance: Present: no acute distress, well-nourished, other





Results





- Labs


CBC & Chem 7: 


 08/25/18 05:43





 08/25/18 05:43


Labs: 


 Laboratory Last Values











WBC  5.9 K/mm3 (4.5-11.0)   08/25/18  05:43    


 


RBC  4.40 M/mm3 (3.65-5.03)   08/25/18  05:43    


 


Hgb  12.4 gm/dl (11.8-15.2)   08/25/18  05:43    


 


Hct  35.1 % (35.5-45.6)  L  08/25/18  05:43    


 


MCV  80 fl (84-94)  L  08/25/18  05:43    


 


MCH  28 pg (28-32)   08/25/18  05:43    


 


MCHC  35 % (32-34)  H  08/25/18  05:43    


 


RDW  14.5 % (13.2-15.2)   08/25/18  05:43    


 


Plt Count  197 K/mm3 (140-440)   08/25/18  05:43    


 


Lymph % (Auto)  15.1 % (13.4-35.0)   08/25/18  05:43    


 


Mono % (Auto)  8.1 % (0.0-7.3)  H  08/25/18  05:43    


 


Eos % (Auto)  2.2 % (0.0-4.3)   08/25/18  05:43    


 


Baso % (Auto)  0.4 % (0.0-1.8)   08/25/18  05:43    


 


Lymph #  0.9 K/mm3 (1.2-5.4)  L  08/25/18  05:43    


 


Mono #  0.5 K/mm3 (0.0-0.8)   08/25/18  05:43    


 


Eos #  0.1 K/mm3 (0.0-0.4)   08/25/18  05:43    


 


Baso #  0.0 K/mm3 (0.0-0.1)   08/25/18  05:43    


 


Total Counted  Cancelled   08/19/18  17:27    


 


Seg Neutrophils %  74.2 % (40.0-70.0)  H  08/25/18  05:43    


 


Seg Neuts % (Manual)  Cancelled   08/19/18  17:27    


 


Band Neutrophils %  Cancelled   08/19/18  17:27    


 


Lymphocytes % (Manual)  Cancelled   08/19/18  17:27    


 


Reactive Lymphs % (Man)  Cancelled   08/19/18  17:27    


 


Monocytes % (Manual)  Cancelled   08/19/18  17:27    


 


Eosinophils % (Manual)  Cancelled   08/19/18  17:27    


 


Basophils % (Manual)  Cancelled   08/19/18  17:27    


 


Metamyelocytes %  Cancelled   08/19/18  17:27    


 


Myelocytes %  Cancelled   08/19/18  17:27    


 


Promyelocytes %  Cancelled   08/19/18  17:27    


 


Blast Cells %  Cancelled   08/19/18  17:27    


 


Nucleated RBC %  Cancelled   08/19/18  17:27    


 


Seg Neutrophils #  4.4 K/mm3 (1.8-7.7)   08/25/18  05:43    


 


Seg Neutrophils # Man  Cancelled   08/19/18  17:27    


 


Band Neutrophils #  Cancelled   08/19/18  17:27    


 


Lymphocytes # (Manual)  Cancelled   08/19/18  17:27    


 


Abs React Lymphs (Man)  Cancelled   08/19/18  17:27    


 


Monocytes # (Manual)  Cancelled   08/19/18  17:27    


 


Eosinophils # (Manual)  Cancelled   08/19/18  17:27    


 


Basophils # (Manual)  Cancelled   08/19/18  17:27    


 


Metamyelocytes #  Cancelled   08/19/18  17:27    


 


Myelocytes #  Cancelled   08/19/18  17:27    


 


Promyelocytes #  Cancelled   08/19/18  17:27    


 


Blast Cells #  Cancelled   08/19/18  17:27    


 


WBC Morphology  Cancelled   08/19/18  17:27    


 


Hypersegmented Neuts  Cancelled   08/19/18  17:27    


 


Hyposegmented Neuts  Cancelled   08/19/18  17:27    


 


Hypogranular Neuts  Cancelled   08/19/18  17:27    


 


Hypersegmented Polys  Cancelled   08/19/18  17:27    


 


Smudge Cells  Cancelled   08/19/18  17:27    


 


Toxic Granulation  Cancelled   08/19/18  17:27    


 


Toxic Vacuolation  Cancelled   08/19/18  17:27    


 


Dohle Bodies  Cancelled   08/19/18  17:27    


 


Pelger-Huet Anomaly  Cancelled   08/19/18  17:27    


 


Phillip Rods  Cancelled   08/19/18  17:27    


 


Platelet Estimate  Cancelled   08/19/18  17:27    


 


Clumped Platelets  Cancelled   08/19/18  17:27    


 


Plt Clumps, EDTA  Cancelled   08/19/18  17:27    


 


Large Platelets  Cancelled   08/19/18  17:27    


 


Giant Platelets  Cancelled   08/19/18  17:27    


 


Platelet Satelliting  Cancelled   08/19/18  17:27    


 


Plt Morphology Comment  Cancelled   08/19/18  17:27    


 


RBC Morphology  Cancelled   08/19/18  17:27    


 


Dimorphic RBCs  Cancelled   08/19/18  17:27    


 


Polychromasia  Cancelled   08/19/18  17:27    


 


Hypochromasia  Cancelled   08/19/18  17:27    


 


Poikilocytosis  Cancelled   08/19/18  17:27    


 


Basophilic Stippling  Cancelled   08/19/18  17:27    


 


Anisocytosis  Cancelled   08/19/18  17:27    


 


Microcytosis  Cancelled   08/19/18  17:27    


 


Macrocytosis  Cancelled   08/19/18  17:27    


 


Spherocytes  Cancelled   08/19/18  17:27    


 


Pappenheimer Bodies  Cancelled   08/19/18  17:27    


 


Sickle Cells  Cancelled   08/19/18  17:27    


 


Target Cells  Cancelled   08/19/18  17:27    


 


Tear Drop Cells  Cancelled   08/19/18  17:27    


 


Ovalocytes  Cancelled   08/19/18  17:27    


 


Stomatocytes  Cancelled   08/19/18  17:27    


 


Helmet Cells  Cancelled   08/19/18  17:27    


 


Burns-Jolly Bodies  Cancelled   08/19/18  17:27    


 


Cabot Rings  Cancelled   08/19/18  17:27    


 


Dudley Cells  Cancelled   08/19/18  17:27    


 


Bite Cells  Cancelled   08/19/18  17:27    


 


Crenated Cell  Cancelled   08/19/18  17:27    


 


Elliptocytes  Cancelled   08/19/18  17:27    


 


Acanthocytes (Spur)  Cancelled   08/19/18  17:27    


 


Rouleaux  Cancelled   08/19/18  17:27    


 


Hemoglobin C Crystals  Cancelled   08/19/18  17:27    


 


Schistocytes  Cancelled   08/19/18  17:27    


 


Malaria parasites  Cancelled   08/19/18  17:27    


 


Abhishek Bodies  Cancelled   08/19/18  17:27    


 


Hem Pathologist Commnt  Cancelled   08/19/18  17:27    


 


Sodium  135 mmol/L (137-145)  L  08/25/18  05:43    


 


Potassium  3.7 mmol/L (3.6-5.0)   08/25/18  05:43    


 


Chloride  99.9 mmol/L ()   08/25/18  05:43    


 


Carbon Dioxide  24 mmol/L (22-30)   08/25/18  05:43    


 


Anion Gap  15 mmol/L  08/25/18  05:43    


 


BUN  2 mg/dL (9-20)  L  08/25/18  05:43    


 


Creatinine  0.6 mg/dL (0.8-1.5)  L  08/25/18  05:43    


 


Estimated GFR  > 60 ml/min  08/25/18  05:43    


 


BUN/Creatinine Ratio  3 %  08/25/18  05:43    


 


Glucose  105 mg/dL ()  H  08/25/18  05:43    


 


POC Glucose  115  ()  H  09/04/18  23:08    


 


Calcium  9.0 mg/dL (8.4-10.2)   08/25/18  05:43    


 


Magnesium  2.20 mg/dL (1.7-2.3)   08/19/18  17:24    


 


Total Bilirubin  1.20 mg/dL (0.1-1.2)   08/19/18  17:27    


 


AST  50 units/L (5-40)  H  08/19/18  17:27    


 


ALT  63 units/L (7-56)  H  08/19/18  17:27    


 


Alkaline Phosphatase  67 units/L ()   08/19/18  17:27    


 


Ammonia  42.0 umol/L (25-60)   08/25/18  15:17    


 


Total Creatine Kinase  148 units/L ()   08/19/18  17:44    


 


CK-MB (CK-2)  1.6 ng/mL (0.0-4.0)   08/19/18  17:44    


 


CK-MB (CK-2) Rel Index  1.0  (0-4)   08/19/18  17:44    


 


Troponin T  < 0.010 ng/mL (0.00-0.029)   08/19/18  17:43    


 


Total Protein  7.2 g/dL (6.3-8.2)   08/19/18  17:27    


 


Albumin  3.6 g/dL (3.9-5)  L  08/19/18  17:27    


 


Albumin/Globulin Ratio  1.0 %  08/19/18  17:27    


 


Vitamin B12  870.0 pg/mL (211-911)   08/25/18  15:13    


 


Folate  6.55 ng/mL (7.3-26.0)  L  08/25/18  15:15    


 


TSH  2.910 mlU/mL (0.270-4.200)   08/25/18  15:16    


 


Free T4  0.92 ng/dL (0.76-1.46)   08/19/18  17:27    


 


Urine Color  Shamika  (Yellow)   08/19/18  17:59    


 


Urine Turbidity  Cloudy  (Clear)   08/19/18  17:59    


 


Urine pH  7.0  (5.0-7.0)   08/19/18  17:59    


 


Ur Specific Gravity  1.021  (1.003-1.030)   08/19/18  17:59    


 


Urine Protein  30 mg/dl mg/dL (Negative)   08/19/18  17:59    


 


Urine Glucose (UA)  Neg mg/dL (Negative)   08/19/18  17:59    


 


Urine Ketones  Neg mg/dL (Negative)   08/19/18  17:59    


 


Urine Blood  Neg  (Negative)   08/19/18  17:59    


 


Urine Nitrite  Neg  (Negative)   08/19/18  17:59    


 


Urine Bilirubin  Neg  (Negative)   08/19/18  17:59    


 


Urine Urobilinogen  4.0 mg/dL (<2.0)   08/19/18  17:59    


 


Ur Leukocyte Esterase  Lg  (Negative)   08/19/18  17:59    


 


Urine WBC (Auto)  92.0 /HPF (0.0-6.0)  H  08/19/18  17:59    


 


Urine RBC (Auto)  6.0 /HPF (0.0-6.0)   08/19/18  17:59    


 


U Epithel Cells (Auto)  < 1.0 /HPF (0-13.0)   08/19/18  17:59    


 


Urine Bacteria (Auto)  3+ /HPF (Negative)   08/19/18  17:59    


 


Ur Transition Epith Cell  2 /HPF  08/19/18  17:59    


 


Urine Mucus  1+ /HPF  08/19/18  17:59    


 


Salicylates  < 0.3 mg/dL (2.8-20.0)  L  08/19/18  17:27    


 


Urine Opiates Screen  Presumptive negative   08/19/18  17:59    


 


Urine Methadone Screen  Presumptive negative   08/19/18  17:59    


 


Acetaminophen  < 5.0 ug/mL (10.0-30.0)  L  08/19/18  17:27    


 


Ur Barbiturates Screen  Presumptive negative   08/19/18  17:59    


 


Ur Phencyclidine Scrn  Presumptive negative   08/19/18  17:59    


 


Ur Amphetamines Screen  Presumptive negative   08/19/18  17:59    


 


U Benzodiazepines Scrn  Presumptive negative   08/19/18  17:59    


 


Urine Cocaine Screen  Presumptive negative   08/19/18  17:59    


 


U Marijuana (THC) Screen  Presumptive negative   08/19/18  17:59    


 


Drugs of Abuse Note  Disclamer   08/19/18  17:59    


 


Plasma/Serum Alcohol  < 0.01 % (0-0.07)   08/19/18  17:27

## 2018-09-21 LAB
BUN SERPL-MCNC: 23 MG/DL (ref 9–20)
BUN/CREAT SERPL: 33 %
CALCIUM SERPL-MCNC: 10.6 MG/DL (ref 8.4–10.2)
HCT VFR BLD CALC: 49.3 % (ref 35.5–45.6)
HEMOLYSIS INDEX: 54
HGB BLD-MCNC: 16.4 GM/DL (ref 11.8–15.2)
MCH RBC QN AUTO: 28 PG (ref 28–32)
MCHC RBC AUTO-ENTMCNC: 33 % (ref 32–34)
MCV RBC AUTO: 83 FL (ref 84–94)
PLATELET # BLD: 121 K/MM3 (ref 140–440)
RBC # BLD AUTO: 5.93 M/MM3 (ref 3.65–5.03)

## 2018-09-21 RX ADMIN — MIRTAZAPINE SCH: 15 TABLET, ORALLY DISINTEGRATING ORAL at 22:11

## 2018-09-21 RX ADMIN — HEPARIN SODIUM SCH: 5000 INJECTION, SOLUTION INTRAVENOUS; SUBCUTANEOUS at 22:10

## 2018-09-21 RX ADMIN — HEPARIN SODIUM SCH UNIT: 5000 INJECTION, SOLUTION INTRAVENOUS; SUBCUTANEOUS at 10:59

## 2018-09-21 RX ADMIN — ARIPIPRAZOLE SCH MG: 10 TABLET ORAL at 10:59

## 2018-09-21 NOTE — PROGRESS NOTE
Assessment and Plan


Assessment and plan: 


58-year-old -American male came from mental health facility for altered 

mental status initially thought to be Catatonia, Psychiatry r/o catatonia, said 

the side effect of his pysch medications


- mental health consulted, and signed off- BUT RECONSULTED


- Patient is not catatonic


- Restart Medication


- Reconsult PSYCH- input noted





Metabolic encephalopathy


- Supportive care


- EEG showed moderate diffuse encephalopathy


- CT normal finding, MRI showed encephalopathy


- Patient markedly improved





UTI


- Treated with IV antibiotics and resolved


- Cultures grew citrobacter , patient completed abx





Failure to eat/malnutrition


- patient is on pured diet





Suspected femoral fracture


-Evaluated by orthopedics, reviewed his imaging and showed no fracture





DVT prophylaxis


Disposition


- Patient is unfunded, but patient need SNF


- Waiting if his sister able to take him home.











History


Interval history: 


Patient seen and examined, now speaking more fluently -per the nurse who is 

also the charge patient asked her out on a date.





Hospitalist Physical





- Physical exam


Narrative exam: 


Not in cardiopulmonary distress. 


 The patient appeared well nourished and normally developed.


 Vital signs as documented.


 Head exam is with marked temporal wasting


 No scleral icterus .


 Neck is without jugular venous distension, thyromegaly, or carotid bruits. 


 Lungs are clear to auscultation.


Cardiac exam reveals regular rate and  Rhythm. First and second heart sounds 

normal. No murmurs, rubs or gallops. 


Abdominal exam reveals normal bowel sounds, no masses, no organomegaly and no 

aortic enlargement. 


Extremities are nonedematous and both femoral and pedal pulses are normal. on 

restraints


CNS: Patient was alert and oriented X2.











- Constitutional


Vitals: 


 











Temp Pulse Resp BP Pulse Ox


 


 98.2 F   71   24   135/91   96 


 


 09/21/18 05:03  09/21/18 05:05  09/21/18 05:03  09/21/18 05:03  09/21/18 10:00











General appearance: Present: no acute distress, well-nourished, other





Results





- Labs


CBC & Chem 7: 


 08/25/18 05:43





 08/25/18 05:43


Labs: 


 Laboratory Last Values











WBC  5.9 K/mm3 (4.5-11.0)   08/25/18  05:43    


 


RBC  4.40 M/mm3 (3.65-5.03)   08/25/18  05:43    


 


Hgb  12.4 gm/dl (11.8-15.2)   08/25/18  05:43    


 


Hct  35.1 % (35.5-45.6)  L  08/25/18  05:43    


 


MCV  80 fl (84-94)  L  08/25/18  05:43    


 


MCH  28 pg (28-32)   08/25/18  05:43    


 


MCHC  35 % (32-34)  H  08/25/18  05:43    


 


RDW  14.5 % (13.2-15.2)   08/25/18  05:43    


 


Plt Count  197 K/mm3 (140-440)   08/25/18  05:43    


 


Lymph % (Auto)  15.1 % (13.4-35.0)   08/25/18  05:43    


 


Mono % (Auto)  8.1 % (0.0-7.3)  H  08/25/18  05:43    


 


Eos % (Auto)  2.2 % (0.0-4.3)   08/25/18  05:43    


 


Baso % (Auto)  0.4 % (0.0-1.8)   08/25/18  05:43    


 


Lymph #  0.9 K/mm3 (1.2-5.4)  L  08/25/18  05:43    


 


Mono #  0.5 K/mm3 (0.0-0.8)   08/25/18  05:43    


 


Eos #  0.1 K/mm3 (0.0-0.4)   08/25/18  05:43    


 


Baso #  0.0 K/mm3 (0.0-0.1)   08/25/18  05:43    


 


Total Counted  Cancelled   08/19/18  17:27    


 


Seg Neutrophils %  74.2 % (40.0-70.0)  H  08/25/18  05:43    


 


Seg Neuts % (Manual)  Cancelled   08/19/18  17:27    


 


Band Neutrophils %  Cancelled   08/19/18  17:27    


 


Lymphocytes % (Manual)  Cancelled   08/19/18  17:27    


 


Reactive Lymphs % (Man)  Cancelled   08/19/18  17:27    


 


Monocytes % (Manual)  Cancelled   08/19/18  17:27    


 


Eosinophils % (Manual)  Cancelled   08/19/18  17:27    


 


Basophils % (Manual)  Cancelled   08/19/18  17:27    


 


Metamyelocytes %  Cancelled   08/19/18  17:27    


 


Myelocytes %  Cancelled   08/19/18  17:27    


 


Promyelocytes %  Cancelled   08/19/18  17:27    


 


Blast Cells %  Cancelled   08/19/18  17:27    


 


Nucleated RBC %  Cancelled   08/19/18  17:27    


 


Seg Neutrophils #  4.4 K/mm3 (1.8-7.7)   08/25/18  05:43    


 


Seg Neutrophils # Man  Cancelled   08/19/18  17:27    


 


Band Neutrophils #  Cancelled   08/19/18  17:27    


 


Lymphocytes # (Manual)  Cancelled   08/19/18  17:27    


 


Abs React Lymphs (Man)  Cancelled   08/19/18  17:27    


 


Monocytes # (Manual)  Cancelled   08/19/18  17:27    


 


Eosinophils # (Manual)  Cancelled   08/19/18  17:27    


 


Basophils # (Manual)  Cancelled   08/19/18  17:27    


 


Metamyelocytes #  Cancelled   08/19/18  17:27    


 


Myelocytes #  Cancelled   08/19/18  17:27    


 


Promyelocytes #  Cancelled   08/19/18  17:27    


 


Blast Cells #  Cancelled   08/19/18  17:27    


 


WBC Morphology  Cancelled   08/19/18  17:27    


 


Hypersegmented Neuts  Cancelled   08/19/18  17:27    


 


Hyposegmented Neuts  Cancelled   08/19/18  17:27    


 


Hypogranular Neuts  Cancelled   08/19/18  17:27    


 


Hypersegmented Polys  Cancelled   08/19/18  17:27    


 


Smudge Cells  Cancelled   08/19/18  17:27    


 


Toxic Granulation  Cancelled   08/19/18  17:27    


 


Toxic Vacuolation  Cancelled   08/19/18  17:27    


 


Dohle Bodies  Cancelled   08/19/18  17:27    


 


Pelger-Huet Anomaly  Cancelled   08/19/18  17:27    


 


Phillip Rods  Cancelled   08/19/18  17:27    


 


Platelet Estimate  Cancelled   08/19/18  17:27    


 


Clumped Platelets  Cancelled   08/19/18  17:27    


 


Plt Clumps, EDTA  Cancelled   08/19/18  17:27    


 


Large Platelets  Cancelled   08/19/18  17:27    


 


Giant Platelets  Cancelled   08/19/18  17:27    


 


Platelet Satelliting  Cancelled   08/19/18  17:27    


 


Plt Morphology Comment  Cancelled   08/19/18  17:27    


 


RBC Morphology  Cancelled   08/19/18  17:27    


 


Dimorphic RBCs  Cancelled   08/19/18  17:27    


 


Polychromasia  Cancelled   08/19/18  17:27    


 


Hypochromasia  Cancelled   08/19/18  17:27    


 


Poikilocytosis  Cancelled   08/19/18  17:27    


 


Basophilic Stippling  Cancelled   08/19/18  17:27    


 


Anisocytosis  Cancelled   08/19/18  17:27    


 


Microcytosis  Cancelled   08/19/18  17:27    


 


Macrocytosis  Cancelled   08/19/18  17:27    


 


Spherocytes  Cancelled   08/19/18  17:27    


 


Pappenheimer Bodies  Cancelled   08/19/18  17:27    


 


Sickle Cells  Cancelled   08/19/18  17:27    


 


Target Cells  Cancelled   08/19/18  17:27    


 


Tear Drop Cells  Cancelled   08/19/18  17:27    


 


Ovalocytes  Cancelled   08/19/18  17:27    


 


Stomatocytes  Cancelled   08/19/18  17:27    


 


Helmet Cells  Cancelled   08/19/18  17:27    


 


Burns-Jolly Bodies  Cancelled   08/19/18  17:27    


 


Cabot Rings  Cancelled   08/19/18  17:27    


 


Labolt Cells  Cancelled   08/19/18  17:27    


 


Bite Cells  Cancelled   08/19/18  17:27    


 


Crenated Cell  Cancelled   08/19/18  17:27    


 


Elliptocytes  Cancelled   08/19/18  17:27    


 


Acanthocytes (Spur)  Cancelled   08/19/18  17:27    


 


Rouleaux  Cancelled   08/19/18  17:27    


 


Hemoglobin C Crystals  Cancelled   08/19/18  17:27    


 


Schistocytes  Cancelled   08/19/18  17:27    


 


Malaria parasites  Cancelled   08/19/18  17:27    


 


Abhishek Bodies  Cancelled   08/19/18  17:27    


 


Hem Pathologist Commnt  Cancelled   08/19/18  17:27    


 


Sodium  135 mmol/L (137-145)  L  08/25/18  05:43    


 


Potassium  3.7 mmol/L (3.6-5.0)   08/25/18  05:43    


 


Chloride  99.9 mmol/L ()   08/25/18  05:43    


 


Carbon Dioxide  24 mmol/L (22-30)   08/25/18  05:43    


 


Anion Gap  15 mmol/L  08/25/18  05:43    


 


BUN  2 mg/dL (9-20)  L  08/25/18  05:43    


 


Creatinine  0.6 mg/dL (0.8-1.5)  L  08/25/18  05:43    


 


Estimated GFR  > 60 ml/min  08/25/18  05:43    


 


BUN/Creatinine Ratio  3 %  08/25/18  05:43    


 


Glucose  105 mg/dL ()  H  08/25/18  05:43    


 


POC Glucose  115  ()  H  09/04/18  23:08    


 


Calcium  9.0 mg/dL (8.4-10.2)   08/25/18  05:43    


 


Magnesium  2.20 mg/dL (1.7-2.3)   08/19/18  17:24    


 


Total Bilirubin  1.20 mg/dL (0.1-1.2)   08/19/18  17:27    


 


AST  50 units/L (5-40)  H  08/19/18  17:27    


 


ALT  63 units/L (7-56)  H  08/19/18  17:27    


 


Alkaline Phosphatase  67 units/L ()   08/19/18  17:27    


 


Ammonia  42.0 umol/L (25-60)   08/25/18  15:17    


 


Total Creatine Kinase  148 units/L ()   08/19/18  17:44    


 


CK-MB (CK-2)  1.6 ng/mL (0.0-4.0)   08/19/18  17:44    


 


CK-MB (CK-2) Rel Index  1.0  (0-4)   08/19/18  17:44    


 


Troponin T  < 0.010 ng/mL (0.00-0.029)   08/19/18  17:43    


 


Total Protein  7.2 g/dL (6.3-8.2)   08/19/18  17:27    


 


Albumin  3.6 g/dL (3.9-5)  L  08/19/18  17:27    


 


Albumin/Globulin Ratio  1.0 %  08/19/18  17:27    


 


Vitamin B12  870.0 pg/mL (211-911)   08/25/18  15:13    


 


Folate  6.55 ng/mL (7.3-26.0)  L  08/25/18  15:15    


 


TSH  2.910 mlU/mL (0.270-4.200)   08/25/18  15:16    


 


Free T4  0.92 ng/dL (0.76-1.46)   08/19/18  17:27    


 


Urine Color  Shamika  (Yellow)   08/19/18  17:59    


 


Urine Turbidity  Cloudy  (Clear)   08/19/18  17:59    


 


Urine pH  7.0  (5.0-7.0)   08/19/18  17:59    


 


Ur Specific Gravity  1.021  (1.003-1.030)   08/19/18  17:59    


 


Urine Protein  30 mg/dl mg/dL (Negative)   08/19/18  17:59    


 


Urine Glucose (UA)  Neg mg/dL (Negative)   08/19/18  17:59    


 


Urine Ketones  Neg mg/dL (Negative)   08/19/18  17:59    


 


Urine Blood  Neg  (Negative)   08/19/18  17:59    


 


Urine Nitrite  Neg  (Negative)   08/19/18  17:59    


 


Urine Bilirubin  Neg  (Negative)   08/19/18  17:59    


 


Urine Urobilinogen  4.0 mg/dL (<2.0)   08/19/18  17:59    


 


Ur Leukocyte Esterase  Lg  (Negative)   08/19/18  17:59    


 


Urine WBC (Auto)  92.0 /HPF (0.0-6.0)  H  08/19/18  17:59    


 


Urine RBC (Auto)  6.0 /HPF (0.0-6.0)   08/19/18  17:59    


 


U Epithel Cells (Auto)  < 1.0 /HPF (0-13.0)   08/19/18  17:59    


 


Urine Bacteria (Auto)  3+ /HPF (Negative)   08/19/18  17:59    


 


Ur Transition Epith Cell  2 /HPF  08/19/18  17:59    


 


Urine Mucus  1+ /HPF  08/19/18  17:59    


 


Salicylates  < 0.3 mg/dL (2.8-20.0)  L  08/19/18  17:27    


 


Urine Opiates Screen  Presumptive negative   08/19/18  17:59    


 


Urine Methadone Screen  Presumptive negative   08/19/18  17:59    


 


Acetaminophen  < 5.0 ug/mL (10.0-30.0)  L  08/19/18  17:27    


 


Ur Barbiturates Screen  Presumptive negative   08/19/18  17:59    


 


Ur Phencyclidine Scrn  Presumptive negative   08/19/18  17:59    


 


Ur Amphetamines Screen  Presumptive negative   08/19/18  17:59    


 


U Benzodiazepines Scrn  Presumptive negative   08/19/18  17:59    


 


Urine Cocaine Screen  Presumptive negative   08/19/18  17:59    


 


U Marijuana (THC) Screen  Presumptive negative   08/19/18  17:59    


 


Drugs of Abuse Note  Disclamer   08/19/18  17:59    


 


Plasma/Serum Alcohol  < 0.01 % (0-0.07)   08/19/18  17:27

## 2018-09-21 NOTE — PROGRESS NOTE
Subjective





- Reason for Consult


Consult date: 09/21/18


Reason for consult: Psychiatry Follow-up





- Chief Complaint


Chief complaint: 


"The patient mumbles"





58-year-old  male who presents to the ER for AMS from 

[facility]. This is a re-consult for this patient. Today the patient is calm, 

but mumbles during the assessment. He did follow some commands when asked. Per 

the staff, the patient has been agitated. No agitation noted during the 

assessment. Per previous assessments, the patient was nonverbal and could not 

follow any commands. Also, this patient had an reaction to a long acting 

antipsychotic medication (Aristada). No gestures of SI/Hi's. 





Mental Status Exam





- Vital signs


 Last Vital Signs











Temp  98.2 F   09/21/18 05:03


 


Pulse  71   09/21/18 05:05


 


Resp  24   09/21/18 05:03


 


BP  135/91   09/21/18 05:03


 


Pulse Ox  96   09/21/18 10:00














- Exam


Narrative exam: 


Unable to complete the MSE because of the patient's condition. 








Assessment and Plan


Impression: R/O Delirium. Today the patient is calm, but mumbles during the 

assessment. 





Recommendation/Plan: The patient's home ([facility] and [facility]) was started by 

the hospitalist. Start Haldol 2 mg IM Q6hrs PRN for acute agitation. Recommend 

Delirium precautions below:





1. Frequently reorient patient and involve him/her in their care (simple 

explanations of procedures, tests, medications).


2. Lights on and shades open during daytime hours.


3. Write date and goals of care in a visible place.


4. Try to avoid unnecessary interruptions to sleep during nighttime hours.


5. Obtain glasses, hearing aids from home if patient uses these at baseline.


6. Avoid medications that may exacerbate delirium (especially narcotics, 

benzodiazepines, barbiturates, ambien, lunesta, and medications with excessive 

anticholinergic properties).

## 2018-09-22 VITALS — DIASTOLIC BLOOD PRESSURE: 71 MMHG | SYSTOLIC BLOOD PRESSURE: 99 MMHG

## 2018-09-22 RX ADMIN — HEPARIN SODIUM SCH: 5000 INJECTION, SOLUTION INTRAVENOUS; SUBCUTANEOUS at 18:59

## 2018-09-22 RX ADMIN — LACTULOSE SCH GM: 20 SOLUTION ORAL at 10:31

## 2018-09-22 RX ADMIN — HEPARIN SODIUM SCH UNIT: 5000 INJECTION, SOLUTION INTRAVENOUS; SUBCUTANEOUS at 06:27

## 2018-09-22 RX ADMIN — LACTULOSE SCH GM: 20 SOLUTION ORAL at 19:10

## 2018-09-22 RX ADMIN — HEPARIN SODIUM SCH UNIT: 5000 INJECTION, SOLUTION INTRAVENOUS; SUBCUTANEOUS at 19:11

## 2018-09-22 NOTE — PROGRESS NOTE
Assessment and Plan


Assessment and plan: 


58-year-old -American male came from mental health facility for altered 

mental status initially thought to be Catatonia, Psychiatry r/o catatonia, said 

the side effect of his pysch medications


- mental health consulted, and signed off- BUT RECONSULTED


- Patient is not catatonic


- Restart Medication


- Reconsult PSYCH- input noted





Metabolic encephalopathy


- Supportive care


- EEG showed moderate diffuse encephalopathy


- CT normal finding, MRI showed encephalopathy


- Patient markedly improved





UTI


- Treated with IV antibiotics and resolved


- Cultures grew citrobacter , patient completed abx





Failure to eat/malnutrition


- patient is on pured diet





Suspected femoral fracture


-Evaluated by orthopedics, reviewed his imaging and showed no fracture





DVT prophylaxis


Disposition


- Patient is unfunded, but patient need SNF


- Waiting if his sister able to take him home.





spoke to family 


they believe medications are what is causing his problems, do not want him on 

any antipyshcotic, they also state he does not have any psychiatric illness and 

that he was improving and ambulating although with unsteady gait.


they have not been able to take him home because they have an emergency and are 

away in [place] to help a dying family member


will discontinue all antipyschotics and sleep aids. 


start lactulose


requested a sitter instead of ristriants


speech for [facility]





Hospitalist Physical





- Constitutional


Vitals: 


 











Temp Pulse Resp BP Pulse Ox


 


 99.2 F   93 H  18   126/69   94 


 


 09/22/18 06:43  09/22/18 06:43  09/22/18 06:43  09/22/18 06:43  09/22/18 06:43











General appearance: Present: no acute distress, well-nourished, other





Results





- Labs


CBC & Chem 7: 


 09/21/18 22:32





 09/21/18 22:32


Labs: 


 Laboratory Last Values











WBC  8.4 K/mm3 (4.5-11.0)   09/21/18  22:32    


 


RBC  5.93 M/mm3 (3.65-5.03)  H  09/21/18  22:32    


 


Hgb  16.4 gm/dl (11.8-15.2)  H  09/21/18  22:32    


 


Hct  49.3 % (35.5-45.6)  H  09/21/18  22:32    


 


MCV  83 fl (84-94)  L  09/21/18  22:32    


 


MCH  28 pg (28-32)   09/21/18  22:32    


 


MCHC  33 % (32-34)   09/21/18  22:32    


 


RDW  15.5 % (13.2-15.2)  H  09/21/18  22:32    


 


Plt Count  121 K/mm3 (140-440)  L  09/21/18  22:32    


 


Lymph % (Auto)  15.1 % (13.4-35.0)   08/25/18  05:43    


 


Mono % (Auto)  8.1 % (0.0-7.3)  H  08/25/18  05:43    


 


Eos % (Auto)  2.2 % (0.0-4.3)   08/25/18  05:43    


 


Baso % (Auto)  0.4 % (0.0-1.8)   08/25/18  05:43    


 


Lymph #  0.9 K/mm3 (1.2-5.4)  L  08/25/18  05:43    


 


Mono #  0.5 K/mm3 (0.0-0.8)   08/25/18  05:43    


 


Eos #  0.1 K/mm3 (0.0-0.4)   08/25/18  05:43    


 


Baso #  0.0 K/mm3 (0.0-0.1)   08/25/18  05:43    


 


Total Counted  Cancelled   08/19/18  17:27    


 


Seg Neutrophils %  74.2 % (40.0-70.0)  H  08/25/18  05:43    


 


Seg Neuts % (Manual)  Cancelled   08/19/18  17:27    


 


Band Neutrophils %  Cancelled   08/19/18  17:27    


 


Lymphocytes % (Manual)  Cancelled   08/19/18  17:27    


 


Reactive Lymphs % (Man)  Cancelled   08/19/18  17:27    


 


Monocytes % (Manual)  Cancelled   08/19/18  17:27    


 


Eosinophils % (Manual)  Cancelled   08/19/18  17:27    


 


Basophils % (Manual)  Cancelled   08/19/18  17:27    


 


Metamyelocytes %  Cancelled   08/19/18  17:27    


 


Myelocytes %  Cancelled   08/19/18  17:27    


 


Promyelocytes %  Cancelled   08/19/18  17:27    


 


Blast Cells %  Cancelled   08/19/18  17:27    


 


Nucleated RBC %  Cancelled   08/19/18  17:27    


 


Seg Neutrophils #  4.4 K/mm3 (1.8-7.7)   08/25/18  05:43    


 


Seg Neutrophils # Man  Cancelled   08/19/18  17:27    


 


Band Neutrophils #  Cancelled   08/19/18  17:27    


 


Lymphocytes # (Manual)  Cancelled   08/19/18  17:27    


 


Abs React Lymphs (Man)  Cancelled   08/19/18  17:27    


 


Monocytes # (Manual)  Cancelled   08/19/18  17:27    


 


Eosinophils # (Manual)  Cancelled   08/19/18  17:27    


 


Basophils # (Manual)  Cancelled   08/19/18  17:27    


 


Metamyelocytes #  Cancelled   08/19/18  17:27    


 


Myelocytes #  Cancelled   08/19/18  17:27    


 


Promyelocytes #  Cancelled   08/19/18  17:27    


 


Blast Cells #  Cancelled   08/19/18  17:27    


 


WBC Morphology  Cancelled   08/19/18  17:27    


 


Hypersegmented Neuts  Cancelled   08/19/18  17:27    


 


Hyposegmented Neuts  Cancelled   08/19/18  17:27    


 


Hypogranular Neuts  Cancelled   08/19/18  17:27    


 


Hypersegmented Polys  Cancelled   08/19/18  17:27    


 


Smudge Cells  Cancelled   08/19/18  17:27    


 


Toxic Granulation  Cancelled   08/19/18  17:27    


 


Toxic Vacuolation  Cancelled   08/19/18  17:27    


 


Dohle Bodies  Cancelled   08/19/18  17:27    


 


Pelger-Huet Anomaly  Cancelled   08/19/18  17:27    


 


Phillip Rods  Cancelled   08/19/18  17:27    


 


Platelet Estimate  Cancelled   08/19/18  17:27    


 


Clumped Platelets  Cancelled   08/19/18  17:27    


 


Plt Clumps, EDTA  Cancelled   08/19/18  17:27    


 


Large Platelets  Cancelled   08/19/18  17:27    


 


Giant Platelets  Cancelled   08/19/18  17:27    


 


Platelet Satelliting  Cancelled   08/19/18  17:27    


 


Plt Morphology Comment  Cancelled   08/19/18  17:27    


 


RBC Morphology  Cancelled   08/19/18  17:27    


 


Dimorphic RBCs  Cancelled   08/19/18  17:27    


 


Polychromasia  Cancelled   08/19/18  17:27    


 


Hypochromasia  Cancelled   08/19/18  17:27    


 


Poikilocytosis  Cancelled   08/19/18  17:27    


 


Basophilic Stippling  Cancelled   08/19/18  17:27    


 


Anisocytosis  Cancelled   08/19/18  17:27    


 


Microcytosis  Cancelled   08/19/18  17:27    


 


Macrocytosis  Cancelled   08/19/18  17:27    


 


Spherocytes  Cancelled   08/19/18  17:27    


 


Pappenheimer Bodies  Cancelled   08/19/18  17:27    


 


Sickle Cells  Cancelled   08/19/18  17:27    


 


Target Cells  Cancelled   08/19/18  17:27    


 


Tear Drop Cells  Cancelled   08/19/18  17:27    


 


Ovalocytes  Cancelled   08/19/18  17:27    


 


Stomatocytes  Cancelled   08/19/18  17:27    


 


Helmet Cells  Cancelled   08/19/18  17:27    


 


Burns-Jolly Bodies  Cancelled   08/19/18  17:27    


 


Cabot Rings  Cancelled   08/19/18  17:27    


 


Tamy Cells  Cancelled   08/19/18  17:27    


 


Bite Cells  Cancelled   08/19/18  17:27    


 


Crenated Cell  Cancelled   08/19/18  17:27    


 


Elliptocytes  Cancelled   08/19/18  17:27    


 


Acanthocytes (Spur)  Cancelled   08/19/18  17:27    


 


Rouleaux  Cancelled   08/19/18  17:27    


 


Hemoglobin C Crystals  Cancelled   08/19/18  17:27    


 


Schistocytes  Cancelled   08/19/18  17:27    


 


Malaria parasites  Cancelled   08/19/18  17:27    


 


Abhishek Bodies  Cancelled   08/19/18  17:27    


 


Hem Pathologist Commnt  Cancelled   08/19/18  17:27    


 


Sodium  140 mmol/L (137-145)   09/21/18  22:32    


 


Potassium  4.4 mmol/L (3.6-5.0)   09/21/18  22:32    


 


Chloride  98.6 mmol/L ()   09/21/18  22:32    


 


Carbon Dioxide  26 mmol/L (22-30)   09/21/18  22:32    


 


Anion Gap  20 mmol/L  09/21/18  22:32    


 


BUN  23 mg/dL (9-20)  H  09/21/18  22:32    


 


Creatinine  0.7 mg/dL (0.8-1.5)  L  09/21/18  22:32    


 


Estimated GFR  > 60 ml/min  09/21/18  22:32    


 


BUN/Creatinine Ratio  33 %  09/21/18  22:32    


 


Glucose  126 mg/dL ()  H  09/21/18  22:32    


 


POC Glucose  115  ()  H  09/04/18  23:08    


 


Calcium  10.6 mg/dL (8.4-10.2)  H  09/21/18  22:32    


 


Magnesium  2.20 mg/dL (1.7-2.3)   08/19/18  17:24    


 


Total Bilirubin  1.20 mg/dL (0.1-1.2)   08/19/18  17:27    


 


AST  50 units/L (5-40)  H  08/19/18  17:27    


 


ALT  63 units/L (7-56)  H  08/19/18  17:27    


 


Alkaline Phosphatase  67 units/L ()   08/19/18  17:27    


 


Ammonia  77.0 umol/L (25-60)  H  09/21/18  22:32    


 


Total Creatine Kinase  148 units/L ()   08/19/18  17:44    


 


CK-MB (CK-2)  1.6 ng/mL (0.0-4.0)   08/19/18  17:44    


 


CK-MB (CK-2) Rel Index  1.0  (0-4)   08/19/18  17:44    


 


Troponin T  < 0.010 ng/mL (0.00-0.029)   08/19/18  17:43    


 


Total Protein  7.2 g/dL (6.3-8.2)   08/19/18  17:27    


 


Albumin  3.6 g/dL (3.9-5)  L  08/19/18  17:27    


 


Albumin/Globulin Ratio  1.0 %  08/19/18  17:27    


 


Vitamin B12  870.0 pg/mL (211-911)   08/25/18  15:13    


 


Folate  6.55 ng/mL (7.3-26.0)  L  08/25/18  15:15    


 


TSH  2.910 mlU/mL (0.270-4.200)   08/25/18  15:16    


 


Free T4  0.92 ng/dL (0.76-1.46)   08/19/18  17:27    


 


Urine Color  Shamika  (Yellow)   08/19/18  17:59    


 


Urine Turbidity  Cloudy  (Clear)   08/19/18  17:59    


 


Urine pH  7.0  (5.0-7.0)   08/19/18  17:59    


 


Ur Specific Gravity  1.021  (1.003-1.030)   08/19/18  17:59    


 


Urine Protein  30 mg/dl mg/dL (Negative)   08/19/18  17:59    


 


Urine Glucose (UA)  Neg mg/dL (Negative)   08/19/18  17:59    


 


Urine Ketones  Neg mg/dL (Negative)   08/19/18  17:59    


 


Urine Blood  Neg  (Negative)   08/19/18  17:59    


 


Urine Nitrite  Neg  (Negative)   08/19/18  17:59    


 


Urine Bilirubin  Neg  (Negative)   08/19/18  17:59    


 


Urine Urobilinogen  4.0 mg/dL (<2.0)   08/19/18  17:59    


 


Ur Leukocyte Esterase  Lg  (Negative)   08/19/18  17:59    


 


Urine WBC (Auto)  92.0 /HPF (0.0-6.0)  H  08/19/18  17:59    


 


Urine RBC (Auto)  6.0 /HPF (0.0-6.0)   08/19/18  17:59    


 


U Epithel Cells (Auto)  < 1.0 /HPF (0-13.0)   08/19/18  17:59    


 


Urine Bacteria (Auto)  3+ /HPF (Negative)   08/19/18  17:59    


 


Ur Transition Epith Cell  2 /HPF  08/19/18  17:59    


 


Urine Mucus  1+ /HPF  08/19/18  17:59    


 


Salicylates  < 0.3 mg/dL (2.8-20.0)  L  08/19/18  17:27    


 


Urine Opiates Screen  Presumptive negative   08/19/18  17:59    


 


Urine Methadone Screen  Presumptive negative   08/19/18  17:59    


 


Acetaminophen  < 5.0 ug/mL (10.0-30.0)  L  08/19/18  17:27    


 


Ur Barbiturates Screen  Presumptive negative   08/19/18  17:59    


 


Ur Phencyclidine Scrn  Presumptive negative   08/19/18  17:59    


 


Ur Amphetamines Screen  Presumptive negative   08/19/18  17:59    


 


U Benzodiazepines Scrn  Presumptive negative   08/19/18  17:59    


 


Urine Cocaine Screen  Presumptive negative   08/19/18  17:59    


 


U Marijuana (THC) Screen  Presumptive negative   08/19/18  17:59    


 


Drugs of Abuse Note  Disclamer   08/19/18  17:59    


 


Plasma/Serum Alcohol  < 0.01 % (0-0.07)   08/19/18  17:27

## 2018-09-22 NOTE — PROGRESS NOTE
Subjective





- Reason for Consult


Reason for consult: psych consult





- Chief Complaint


Chief complaint: 


"The patient mumbles"





58-year-old  male who presents to the ER for AMS from 

[place]. This is a re-consult for this patient. Today the patient is calm, 

but mumbles during the assessment. He did follow some commands when asked. Per 

the staff, the patient has been agitated. No agitation noted during the 

assessment. Per previous assessments, the patient was nonverbal and could not 

follow any commands. Also, this patient had an reaction to a long acting 

antipsychotic medication (Aristada). No gestures of SI/Hi's. 





Mental Status Exam





- Vital signs


 Last Vital Signs











Temp  97.5 F L  09/22/18 11:44


 


Pulse  102 H  09/22/18 11:44


 


Resp  19   09/22/18 11:44


 


BP  116/83   09/22/18 11:44


 


Pulse Ox  96   09/22/18 11:44














Assessment and Plan


 Assessment and Plan 


Impression: Delirium NOS;  psychosis nos history





Recommendation/Plan: At this time the main issue is his delirium and not his 

history of psychosis.  Patient's presentation is in line with his long 

hospitalization.  Once his confusion/cognition improves we can reevaluate his 

need of an antipsychotic.  His family was present during the interview and they 

insisted that any psychotropics right now tend to worsen his presentation.  

They would like for him to go to a rehab facility to strengthen his physical 

condition.  Given the lack of active psychosis, we do not rec any further 

psychiatric meds be utilized on a standing basis.  Minimize meds that cause 

further confusion and focus on reorientation with focus on his ADL's, sleep, 

and eating.  We don't rec use of antipsychotics and also to minimize sedatives 

for PRN purposes.  We will sign off on this case at this time.

## 2018-09-23 ENCOUNTER — HOSPITAL ENCOUNTER (INPATIENT)
Dept: HOSPITAL 5 - ED | Age: 58
LOS: 3 days | Discharge: HOME | DRG: 70 | End: 2018-09-26
Attending: INTERNAL MEDICINE | Admitting: INTERNAL MEDICINE
Payer: SELF-PAY

## 2018-09-23 DIAGNOSIS — F20.2: ICD-10-CM
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
## 2018-09-23 DIAGNOSIS — E43: ICD-10-CM
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
## 2018-09-23 DIAGNOSIS — F41.9: ICD-10-CM

## 2018-09-23 DIAGNOSIS — E86.0: ICD-10-CM

## 2018-09-23 DIAGNOSIS — F32.9: ICD-10-CM

## 2018-09-23 DIAGNOSIS — R62.7: ICD-10-CM
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
## 2018-09-23 DIAGNOSIS — G93.40: Primary | ICD-10-CM

## 2018-09-23 LAB
ALBUMIN SERPL-MCNC: 4.3 G/DL (ref 3.9–5)
ALT SERPL-CCNC: 34 UNITS/L (ref 7–56)
BASOPHILS # (AUTO): 0 K/MM3 (ref 0–0.1)
BASOPHILS NFR BLD AUTO: 0.5 % (ref 0–1.8)
BUN SERPL-MCNC: 20 MG/DL (ref 9–20)
BUN/CREAT SERPL: 25 %
CALCIUM SERPL-MCNC: 10.3 MG/DL (ref 8.4–10.2)
EOSINOPHIL # BLD AUTO: 0 K/MM3 (ref 0–0.4)
EOSINOPHIL NFR BLD AUTO: 0 % (ref 0–4.3)
HCT VFR BLD CALC: 44.1 % (ref 35.5–45.6)
HEMOLYSIS INDEX: 3
HGB BLD-MCNC: 14.7 GM/DL (ref 11.8–15.2)
LYMPHOCYTES # BLD AUTO: 0.9 K/MM3 (ref 1.2–5.4)
LYMPHOCYTES NFR BLD AUTO: 10.1 % (ref 13.4–35)
MCH RBC QN AUTO: 28 PG (ref 28–32)
MCHC RBC AUTO-ENTMCNC: 33 % (ref 32–34)
MCV RBC AUTO: 82 FL (ref 84–94)
MONOCYTES # (AUTO): 0.5 K/MM3 (ref 0–0.8)
MONOCYTES % (AUTO): 5.9 % (ref 0–7.3)
PLATELET # BLD: 182 K/MM3 (ref 140–440)
RBC # BLD AUTO: 5.35 M/MM3 (ref 3.65–5.03)

## 2018-09-23 PROCEDURE — 85025 COMPLETE CBC W/AUTO DIFF WBC: CPT

## 2018-09-23 PROCEDURE — 80048 BASIC METABOLIC PNL TOTAL CA: CPT

## 2018-09-23 PROCEDURE — 81001 URINALYSIS AUTO W/SCOPE: CPT

## 2018-09-23 PROCEDURE — G0480 DRUG TEST DEF 1-7 CLASSES: HCPCS

## 2018-09-23 PROCEDURE — 82962 GLUCOSE BLOOD TEST: CPT

## 2018-09-23 PROCEDURE — 80307 DRUG TEST PRSMV CHEM ANLYZR: CPT

## 2018-09-23 PROCEDURE — 84425 ASSAY OF VITAMIN B-1: CPT

## 2018-09-23 PROCEDURE — 80053 COMPREHEN METABOLIC PANEL: CPT

## 2018-09-23 PROCEDURE — 84443 ASSAY THYROID STIM HORMONE: CPT

## 2018-09-23 PROCEDURE — 87116 MYCOBACTERIA CULTURE: CPT

## 2018-09-23 PROCEDURE — 84207 ASSAY OF VITAMIN B-6: CPT

## 2018-09-23 PROCEDURE — 80320 DRUG SCREEN QUANTALCOHOLS: CPT

## 2018-09-23 PROCEDURE — 82140 ASSAY OF AMMONIA: CPT

## 2018-09-23 PROCEDURE — 36415 COLL VENOUS BLD VENIPUNCTURE: CPT

## 2018-09-23 RX ADMIN — Medication SCH ML: at 23:22

## 2018-09-23 RX ADMIN — DEXTROSE AND SODIUM CHLORIDE SCH MLS/HR: 5; .9 INJECTION, SOLUTION INTRAVENOUS at 23:22

## 2018-09-23 RX ADMIN — DEXTROSE AND SODIUM CHLORIDE SCH MLS/HR: 5; .9 INJECTION, SOLUTION INTRAVENOUS at 13:09

## 2018-09-23 NOTE — HISTORY AND PHYSICAL REPORT
History of Present Illness


Date of examination: 09/23/18


Date of admission: 


09/23/18





Chief complaint: 


Altered mental status, combative, encephalopathic, acute psychosis





History of present illness: 


Patient is a 58-year-old male who presents to the ER would report of acute 

encephalopathy, psychosis, and as detailed below was recently discharged to 

personal care home and now presented from home less than 24 hours after 

discharge the patient has stated that the medicine facility reported that 

patient was "dumped off to them".  


Information from recent discharge is as below.  She does not be a combative at 

this time resting comfortably although still incoherent speech.


58-year-old -American male came from mental health facility for altered 

mental status Catatonia, Psychiatry r/o catatonia, said the side effect of his 

pysch medications during hospitalization multiple etiologies and research well 

unable to provide the reason for patient's state of health it was felt by 

psychiatry that the antipsychotic medication the patient was on a recent 

bladder was likely the cause patient was discontinued on all medications on my 

taking over on 09/18/2018 I did restart some of the patient's home medication 

and reconsult as psych S patient continued to be restrained and was barely 

responsive on my very first day.  The patient improved 2 days later and on the 

third day was able to speak clearly to the nursing staff actually asking the 

MRSA staph out of the date.  On the day of discharge the patient's family 

member resented uncalled another family member on the phone who reported that 

the patient has been mismanaged by medications in the past and does not want 

the patient on any antipsychotic except trazodone if needed.  I did advise but 

the patient has been on restrained to which they said that the patient every 

weekend when they come by that the patient is able to ambulate with minimal 

assistance and does not need to be on restraints but has had a change of 

condition since medications were started.  They also reported that they would 

not be able to take the patient home due to a death in the family.  I was 

notified by nurses case management of the patient can go to a personal care 

home to have accepted the patient I have asked if the nursing of the care home 

has seen the patient recently and was told to the affirmative yes patient was 

discharged to the care of home with recommendation to continue physical therapy 

and ultimately get back to baseline.  Patient was discharged on lactulose as 

someone level was mildly elevated all the medications were discontinued.


The EKG at that time was moderately diffused CT and MRI were unremarkable.  

Patient was treated for UTI.  Also failure to eat severe malnutrition.  Initial 

Concern of Fracture Which Was Reviewed by  Orthopedic Surgeon and stated no 

fracture was noted











Past History


Past Medical History: other (highly contrasted past medical history per family 

but per documentation schizophrenia, psychosis, delirium,)


Past Surgical History: No surgical history


Social history: no significant social history


Family history: no significant family history





Medications and Allergies


 Allergies











Allergy/AdvReac Type Severity Reaction Status Date / Time


 


No Known Allergies Allergy   Unverified 08/19/18 17:07











 Home Medications











 Medication  Instructions  Recorded  Confirmed  Last Taken  Type


 


Lactulose [Cephulac] 20 gm PO Q8H #30 oral.liqd 09/22/18  Unknown Rx











Active Meds: 


Active Medications





Acetaminophen (Tylenol)  650 mg PO Q4H PRN


   PRN Reason: Pain MILD(1-3)/Fever >100.5/HA


Dextrose/Sodium Chloride (D5ns)  1,000 mls @ 125 mls/hr IV AS DIRECT AISHWARYA


Ondansetron HCl (Zofran)  4 mg IV Q8H PRN


   PRN Reason: Nausea And Vomiting


Sodium Chloride (Sodium Chloride Flush Syringe 10 Ml)  10 ml IV BID AISHWARYA


Sodium Chloride (Sodium Chloride Flush Syringe 10 Ml)  10 ml IV PRN PRN


   PRN Reason: LINE FLUSH











Review of Systems


ROS unobtainable: due to mental status





Exam





- Physical Exam


Narrative exam: 


 VITAL SIGNS:  Reviewed.    


GENERAL:  The patient appeared chronically ill appearing appears zone out 

incoherent speech emaciated vital signs as documented.


HEAD:  No signs of head trauma.  Marked temporal wasting noted


EYES:  Pupils are equal.  Extraocular motions intact.  


EARS:  Hearing grossly intact.


MOUTH:  Oropharynx is normal. 


NECK:  No adenopathy, no JVD.   


CHEST:  Chest with clear breath sounds bilaterally.  No wheezes, rales, or 

rhonchi.  


CARDIAC:  Regular rate and rhythm.  S1 and S2, without murmurs, gallops, or 

rubs.


VASCULAR:  No Edema.  Peripheral pulses normal and equal in all extremities.


ABDOMEN:  Soft, without detectable tenderness.  No sign of distention.  No   

rebound or guarding, and no masses palpated.   Bowel Sounds normal.


MUSCULOSKELETAL:  Good range of motion of all major joints. Extremities without 

clubbing, cyanosis or edema.  


NEUROLOGIC EXAM: Awake but not oriented No focal sensory or strength deficits. 

  Speech slow incoherent for the commands .


PSYCHIATRIC: Unable to assess


SKIN: Per nursing documentation














- Constitutional


Vitals: 


 











Temp Pulse Resp BP Pulse Ox


 


 98.5 F   95 H  10 L  108/74   100 


 


 09/23/18 10:31  09/23/18 10:31  09/23/18 10:31  09/23/18 10:31  09/23/18 10:31














Results





- Labs


CBC & Chem 7: 


 09/23/18 09:56





 09/23/18 09:56


Labs: 


 Laboratory Last Values











WBC  8.5 K/mm3 (4.5-11.0)   09/23/18  09:56    


 


RBC  5.35 M/mm3 (3.65-5.03)  H  09/23/18  09:56    


 


Hgb  14.7 gm/dl (11.8-15.2)   09/23/18  09:56    


 


Hct  44.1 % (35.5-45.6)   09/23/18  09:56    


 


MCV  82 fl (84-94)  L  09/23/18  09:56    


 


MCH  28 pg (28-32)   09/23/18  09:56    


 


MCHC  33 % (32-34)   09/23/18  09:56    


 


RDW  15.0 % (13.2-15.2)   09/23/18  09:56    


 


Plt Count  182 K/mm3 (140-440)   09/23/18  09:56    


 


Lymph % (Auto)  10.1 % (13.4-35.0)  L  09/23/18  09:56    


 


Mono % (Auto)  5.9 % (0.0-7.3)   09/23/18  09:56    


 


Eos % (Auto)  0.0 % (0.0-4.3)   09/23/18  09:56    


 


Baso % (Auto)  0.5 % (0.0-1.8)   09/23/18  09:56    


 


Lymph #  0.9 K/mm3 (1.2-5.4)  L  09/23/18  09:56    


 


Mono #  0.5 K/mm3 (0.0-0.8)   09/23/18  09:56    


 


Eos #  0.0 K/mm3 (0.0-0.4)   09/23/18  09:56    


 


Baso #  0.0 K/mm3 (0.0-0.1)   09/23/18  09:56    


 


Seg Neutrophils %  83.5 % (40.0-70.0)  H  09/23/18  09:56    


 


Seg Neutrophils #  7.1 K/mm3 (1.8-7.7)   09/23/18  09:56    


 


Sodium  142 mmol/L (137-145)   09/23/18  09:56    


 


Potassium  4.3 mmol/L (3.6-5.0)   09/23/18  09:56    


 


Chloride  102.7 mmol/L ()   09/23/18  09:56    


 


Carbon Dioxide  30 mmol/L (22-30)   09/23/18  09:56    


 


Anion Gap  14 mmol/L  09/23/18  09:56    


 


BUN  20 mg/dL (9-20)   09/23/18  09:56    


 


Creatinine  0.8 mg/dL (0.8-1.5)   09/23/18  09:56    


 


Estimated GFR  > 60 ml/min  09/23/18  09:56    


 


BUN/Creatinine Ratio  25 %  09/23/18  09:56    


 


Glucose  108 mg/dL ()  H  09/23/18  09:56    


 


Calcium  10.3 mg/dL (8.4-10.2)  H  09/23/18  09:56    


 


Total Bilirubin  1.30 mg/dL (0.1-1.2)  H  09/23/18  09:56    


 


AST  24 units/L (5-40)   09/23/18  09:56    


 


ALT  34 units/L (7-56)   09/23/18  09:56    


 


Alkaline Phosphatase  57 units/L ()   09/23/18  09:56    


 


Ammonia  19.0 umol/L (25-60)  L  09/23/18  09:56    


 


Total Protein  7.9 g/dL (6.3-8.2)   09/23/18  09:56    


 


Albumin  4.3 g/dL (3.9-5)   09/23/18  09:56    


 


Albumin/Globulin Ratio  1.2 %  09/23/18  09:56    


 


Plasma/Serum Alcohol  < 0.01 % (0-0.07)   09/23/18  09:56    














Assessment and Plan


Assessment and plan: 


aubrie is a 58-year-old male who presents to the ER would report of acute 

encephalopathy, psychosis, and as detailed below was recently discharged to 

personal care home and now presented from home less than 24 hours after 

discharge the patient has stated that the medicine facility reported that 

patient was "dumped off to them".  


Information from recent discharge is as below.  She does not be a combative at 

this time resting comfortably although still incoherent speech.





58-year-old -American male came from mental health facility for altered 

mental status Catatonia, Psychiatry r/o catatonia, said the side effect of his 

pysch medications during hospitalization multiple etiologies and research well 

unable to provide the reason for patient's state of health it was felt by 

psychiatry that the antipsychotic medication the patient was on a recent 

bladder was likely the cause patient was discontinued on all medications on my 

taking over on 09/18/2018 I did restart some of the patient's home medication 

and reconsult as psych S patient continued to be restrained and was barely 

responsive on my very first day.  The patient improved 2 days later and on the 

third day was able to speak clearly to the nursing staff actually asking the 

MRSA staph out of the date.  On the day of discharge the patient's family 

member resented uncalled another family member on the phone who reported that 

the patient has been mismanaged by medications in the past and does not want 

the patient on any antipsychotic except trazodone if needed.  I did advise but 

the patient has been on restrained to which they said that the patient every 

weekend when they come by that the patient is able to ambulate with minimal 

assistance and does not need to be on restraints but has had a change of 

condition since medications were started.  They also reported that they would 

not be able to take the patient home due to a death in the family.  I was 

notified by nurses case management of the patient can go to a personal care 

home to have accepted the patient I have asked if the nursing of the care home 

has seen the patient recently and was told to the affirmative yes patient was 

discharged to the care of home with recommendation to continue physical therapy 

and ultimately get back to baseline.  Patient was discharged on lactulose as 

someone level was mildly elevated all the medications were discontinued.


The EKG at that time was moderately diffused CT and MRI were unremarkable.  

Patient was treated for UTI.  Also failure to eat severe malnutrition.  Initial 

Concern of Fracture Which Was Reviewed by  Orthopedic Surgeon and stated no 

fracture was noted








Acute psychosis with encephalopathy


Delirious state


Severe protein calorie malnutrition


Failure to thrive





Plan


We'll admit patient to MedSurg unit


We'll obtain psych consultation


Aspiration and Fall precautions


Avoid all antipsychotics


Assist with meals


Case management consultation for appropriate disposition and placement


DVT and GI prophylaxis


Advance Directives: No (unknown to me at this time no family present)


Plan of care discussed with patient/family: Yes

## 2018-09-23 NOTE — EMERGENCY DEPARTMENT REPORT
ED General Adult HPI





- General


Stated complaint: BEHAVIOR


Time Seen by Provider: 09/23/18 09:02





- History of Present Illness


Initial comments: 





Mr. Benjamin is 58 years old male with history of mental health mainly catatonic 

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  Patient was discharged yesterday by 

Dr. Valentino to a personal care but unfortunately patient was sent back today 

because of combativeness at nursing home.  Patient is calm in no acute distress 

and not communicating.  So history is limited because of patient mental status.

  Most of the history is from previous record and from Dr. Valentino.





- Related Data


 Previous Rx's











 Medication  Instructions  Recorded  Last Taken  Type


 


Lactulose [Cephulac] 20 gm PO Q8H #30 oral.liqd 09/22/18 Unknown Rx











 Allergies











Allergy/AdvReac Type Severity Reaction Status Date / Time


 


No Known Allergies Allergy   Unverified 08/19/18 17:07














ED Review of Systems


ROS: 


Stated complaint: BEHAVIOR


Other details as noted in HPI





Comment: Unobtainable due to pts medical conditions





ED Past Medical Hx





- Past Medical History


Hx Psychiatric Treatment: Yes (schizophrenia, depression, anxiety)





- Social History


Smoking Status: Never Smoker


Substance Use Type: None





- Medications


Home Medications: 


 Home Medications











 Medication  Instructions  Recorded  Confirmed  Last Taken  Type


 


Lactulose [Cephulac] 20 gm PO Q8H #30 oral.liqd 09/22/18  Unknown Rx














ED Physical Exam





- General


Limitations: Altered Mental Status


General appearance: alert, in no apparent distress





- Head


Head exam: Present: atraumatic, normocephalic, normal inspection





- ENT


ENT exam: Present: normal exam, normal orophraynx, mucous membranes moist





- Neck


Neck exam: Present: normal inspection, full ROM.  Absent: tenderness, 

meningismus, lymphadenopathy, thyromegaly





- Respiratory


Respiratory exam: Present: normal lung sounds bilaterally





- Cardiovascular


Cardiovascular Exam: Present: regular rate, normal rhythm, normal heart sounds





- GI/Abdominal


GI/Abdominal exam: Present: soft, normal bowel sounds.  Absent: distended, 

tenderness, guarding, rebound, rigid, organomegaly, mass, bruit, pulsatile mass





- Extremities Exam


Extremities exam: Present: normal inspection, full ROM, normal capillary 

refill.  Absent: pedal edema, calf tenderness





- Back Exam


Back exam: Present: normal inspection, full ROM





- Neurological Exam


Neurological exam: Present: alert, altered





- Skin


Skin exam: Present: warm, intact





ED Medical Decision Making





- Medical Decision Making





I discussed the patient with Dr. Valentino, he advised to admit to the third floor 

under his name


Critical care attestation.: 


If time is entered above; I have spent that time in minutes in the direct care 

of this critically ill patient, excluding procedure time.








ED Disposition


Clinical Impression: 


 Altered mental status, Schizophrenia, Combative behavior





Disposition: DC-09 OP ADMIT IP TO THIS HOSP


Is pt being admited?: Yes


Condition: Stable


Referrals: 


PRIMARY CARE,MD [Primary Care Provider] - 3-5 Days

## 2018-09-24 LAB
BENZODIAZEPINES SCREEN,URINE: (no result)
BILIRUB UR QL STRIP: (no result)
BLOOD UR QL VISUAL: (no result)
METHADONE SCREEN,URINE: (no result)
MUCOUS THREADS #/AREA URNS HPF: (no result) /HPF
OPIATE SCREEN,URINE: (no result)
PH UR STRIP: 7 [PH] (ref 5–7)
PROT UR STRIP-MCNC: (no result) MG/DL
RBC #/AREA URNS HPF: 8 /HPF (ref 0–6)
UROBILINOGEN UR-MCNC: 4 MG/DL (ref ?–2)
WBC #/AREA URNS HPF: 129 /HPF (ref 0–6)

## 2018-09-24 RX ADMIN — Medication SCH ML: at 09:18

## 2018-09-24 RX ADMIN — Medication SCH ML: at 22:58

## 2018-09-24 RX ADMIN — DEXTROSE AND SODIUM CHLORIDE SCH MLS/HR: 5; .9 INJECTION, SOLUTION INTRAVENOUS at 22:57

## 2018-09-24 NOTE — PROGRESS NOTE
Assessment and Plan


Assessment and plan: 


aubrie is a 58-year-old male who presents to the ER would report of acute 

encephalopathy, psychosis, and as detailed below was recently discharged to 

personal care home and now presented from home less than 24 hours after 

discharge the patient has stated that the medicine facility reported that 

patient was "dumped off to them".  


Information from recent discharge is as below.  She does not be a combative at 

this time resting comfortably although still incoherent speech.





58-year-old -American male came from mental health facility for altered 

mental status Catatonia, Psychiatry r/o catatonia, said the side effect of his 

pysch medications during hospitalization multiple etiologies and research well 

unable to provide the reason for patient's state of health it was felt by 

psychiatry that the antipsychotic medication the patient was on a recent 

bladder was likely the cause patient was discontinued on all medications on my 

taking over on 09/18/2018 I did restart some of the patient's home medication 

and reconsult as psych S patient continued to be restrained and was barely 

responsive on my very first day.  The patient improved 2 days later and on the 

third day was able to speak clearly to the nursing staff actually asking the 

MRSA staph out of the date.  On the day of discharge the patient's family 

member resented uncalled another family member on the phone who reported that 

the patient has been mismanaged by medications in the past and does not want 

the patient on any antipsychotic except trazodone if needed.  I did advise but 

the patient has been on restrained to which they said that the patient every 

weekend when they come by that the patient is able to ambulate with minimal 

assistance and does not need to be on restraints but has had a change of 

condition since medications were started.  They also reported that they would 

not be able to take the patient home due to a death in the family.  I was 

notified by nurses case management of the patient can go to a personal care 

home to have accepted the patient I have asked if the nursing of the care home 

has seen the patient recently and was told to the affirmative yes patient was 

discharged to the care of home with recommendation to continue physical therapy 

and ultimately get back to baseline.  Patient was discharged on lactulose as 

someone level was mildly elevated all the medications were discontinued.


The EKG at that time was moderately diffused CT and MRI were unremarkable.  

Patient was treated for UTI.  Also failure to eat severe malnutrition.  Initial 

Concern of Fracture Which Was Reviewed by  Orthopedic Surgeon and stated no 

fracture was noted








Acute psychosis with encephalopathy


Delirious state


Severe protein calorie malnutrition


Failure to thrive


Fall due to severe debility 





Plan


Continue supportive care


Discussed with psychiatry team no antipsychotic for this patient


Condition is a patient with discharged to facility with appropriate 

rehabilitation all home with home health


Aspiration and Fall precautions


PT OT evaluation and treat


Speech she followed to the patient today and he can tolerate regular diet will 

change diet to regular.


Avoid all antipsychotics


Assist with meals


Case management consultation for appropriate disposition and placement


DVT and GI prophylaxis


If patient continues to improve in the next 2-3 days Be discharged either to a 

shelter alto personal care home.  She is no longer requiring any sedatives or 

restraints.





History


Interval history: 


Patient seen and examined this morning continues to improve and mental clarity.

  Although still confused and not following commands as he is significantly 

lethargic but continues to attempt to get out of bed and has had a fall today 

hands and restraints.  He is at least able to recall it has a girlfriend but 

was asking the staff his girlfriend's last name.  Family were able to get ahold 

of the systolic was currently [place] and she wants the patient to receive 

antipsychotic medication and was deemed appropriate.








Hospitalist Physical





- Physical exam


Narrative exam: 


 VITAL SIGNS:  Reviewed.    


GENERAL:  The patient appeared chronically ill appearing appears zone out 

incoherent speech emaciated vital signs as documented.


HEAD:  No signs of head trauma.  Marked temporal wasting noted


EYES:  Pupils are equal.  Extraocular motions intact.  


EARS:  Hearing grossly intact.


MOUTH:  Oropharynx is normal. 


NECK:  No adenopathy, no JVD.   


CHEST:  Chest with clear breath sounds bilaterally.  No wheezes, rales, or 

rhonchi.  


CARDIAC:  Regular rate and rhythm.  S1 and S2, without murmurs, gallops, or 

rubs.


VASCULAR:  No Edema.  Peripheral pulses normal and equal in all extremities.


ABDOMEN:  Soft, without detectable tenderness.  No sign of distention.  No   

rebound or guarding, and no masses palpated.   Bowel Sounds normal.


MUSCULOSKELETAL:  Good range of motion of all major joints. Extremities without 

clubbing, cyanosis or edema.  


NEUROLOGIC EXAM: Awake and alert and oriented to person No focal sensory or 

strength deficits.   Speech slow incoherent for the commands .


PSYCHIATRIC: Unable to assess


SKIN: Per nursing documentation














- Constitutional


Vitals: 


 











Temp Pulse Resp BP Pulse Ox


 


 98.3 F   103 H  18   95/72   100 


 


 09/23/18 13:57  09/23/18 13:57  09/23/18 13:57  09/23/18 13:57  09/23/18 13:57














Results





- Labs


CBC & Chem 7: 


 09/23/18 09:56





 09/23/18 09:56


Labs: 


 Laboratory Last Values











WBC  8.5 K/mm3 (4.5-11.0)   09/23/18  09:56    


 


RBC  5.35 M/mm3 (3.65-5.03)  H  09/23/18  09:56    


 


Hgb  14.7 gm/dl (11.8-15.2)   09/23/18  09:56    


 


Hct  44.1 % (35.5-45.6)   09/23/18  09:56    


 


MCV  82 fl (84-94)  L  09/23/18  09:56    


 


MCH  28 pg (28-32)   09/23/18  09:56    


 


MCHC  33 % (32-34)   09/23/18  09:56    


 


RDW  15.0 % (13.2-15.2)   09/23/18  09:56    


 


Plt Count  182 K/mm3 (140-440)   09/23/18  09:56    


 


Lymph % (Auto)  10.1 % (13.4-35.0)  L  09/23/18  09:56    


 


Mono % (Auto)  5.9 % (0.0-7.3)   09/23/18  09:56    


 


Eos % (Auto)  0.0 % (0.0-4.3)   09/23/18  09:56    


 


Baso % (Auto)  0.5 % (0.0-1.8)   09/23/18  09:56    


 


Lymph #  0.9 K/mm3 (1.2-5.4)  L  09/23/18  09:56    


 


Mono #  0.5 K/mm3 (0.0-0.8)   09/23/18  09:56    


 


Eos #  0.0 K/mm3 (0.0-0.4)   09/23/18  09:56    


 


Baso #  0.0 K/mm3 (0.0-0.1)   09/23/18  09:56    


 


Seg Neutrophils %  83.5 % (40.0-70.0)  H  09/23/18  09:56    


 


Seg Neutrophils #  7.1 K/mm3 (1.8-7.7)   09/23/18  09:56    


 


Sodium  142 mmol/L (137-145)   09/23/18  09:56    


 


Potassium  4.3 mmol/L (3.6-5.0)   09/23/18  09:56    


 


Chloride  102.7 mmol/L ()   09/23/18  09:56    


 


Carbon Dioxide  30 mmol/L (22-30)   09/23/18  09:56    


 


Anion Gap  14 mmol/L  09/23/18  09:56    


 


BUN  20 mg/dL (9-20)   09/23/18  09:56    


 


Creatinine  0.8 mg/dL (0.8-1.5)   09/23/18  09:56    


 


Estimated GFR  > 60 ml/min  09/23/18  09:56    


 


BUN/Creatinine Ratio  25 %  09/23/18  09:56    


 


Glucose  108 mg/dL ()  H  09/23/18  09:56    


 


Calcium  10.3 mg/dL (8.4-10.2)  H  09/23/18  09:56    


 


Total Bilirubin  1.30 mg/dL (0.1-1.2)  H  09/23/18  09:56    


 


AST  24 units/L (5-40)   09/23/18  09:56    


 


ALT  34 units/L (7-56)   09/23/18  09:56    


 


Alkaline Phosphatase  57 units/L ()   09/23/18  09:56    


 


Ammonia  19.0 umol/L (25-60)  L  09/23/18  09:56    


 


Total Protein  7.9 g/dL (6.3-8.2)   09/23/18  09:56    


 


Albumin  4.3 g/dL (3.9-5)   09/23/18  09:56    


 


Albumin/Globulin Ratio  1.2 %  09/23/18  09:56    


 


TSH  3.550 mlU/mL (0.270-4.200)   09/23/18  11:19    


 


Plasma/Serum Alcohol  < 0.01 % (0-0.07)   09/23/18  09:56

## 2018-09-24 NOTE — CONSULTATION
History of Present Illness





- Reason for Consult


Consult date: 09/24/18


Reason for consult: Mental Health Evaluation


Requesting physician: ASHLI FERNANDEZ





- Chief Complaint


Chief complaint: 


"AMS"








- History of Present Psychiatric Illness


58 years old male with history of mental health presents back to the hospital 

after being discharged less than 24 hours. This patient is known to me. Today 

the patient is calm, but confused during the assessment. His speech is 

incoherent. No gestures of SI/HI's. 








Medications and Allergies


 Allergies











Allergy/AdvReac Type Severity Reaction Status Date / Time


 


No Known Allergies Allergy   Unverified 08/19/18 17:07











 Home Medications











 Medication  Instructions  Recorded  Confirmed  Last Taken  Type


 


Lactulose [Cephulac] 20 gm PO Q8H #30 oral.liqd 09/22/18 09/23/18 09/22/18 Rx











Active Meds: 


Active Medications





Acetaminophen (Tylenol)  650 mg PO Q4H PRN


   PRN Reason: Pain MILD(1-3)/Fever >100.5/HA


Dextrose/Sodium Chloride (D5ns)  1,000 mls @ 125 mls/hr IV AS DIRECT AISHWARYA


   Last Admin: 09/23/18 23:22 Dose:  125 mls/hr


Ondansetron HCl (Zofran)  4 mg IV Q8H PRN


   PRN Reason: Nausea And Vomiting


Sodium Chloride (Sodium Chloride Flush Syringe 10 Ml)  10 ml IV BID [facility]


   Last Admin: 09/24/18 09:18 Dose:  10 ml


Sodium Chloride (Sodium Chloride Flush Syringe 10 Ml)  10 ml IV PRN PRN


   PRN Reason: LINE FLUSH











Past psychiatric history





- Past Medical History


Past Medical History: other (Unable to obatin)


Past Surgical History: Other (Unable to obtain)





- past Psychiatric treatment and history


psychiatric treatment history: 


[facility] outpatient prior to his original admission 8/2019. Could not obtain a 

fam psy hx. 








Mental Status Exam





- Vital signs


 Last Vital Signs











Temp  98.3 F   09/23/18 13:57


 


Pulse  103 H  09/23/18 13:57


 


Resp  18   09/23/18 13:57


 


BP  95/72   09/23/18 13:57


 


Pulse Ox  100   09/23/18 13:57














- Exam


Narrative exam: 


Unable to complete the MSE because of the patient's condition.








Results


Result Diagrams: 


 09/23/18 09:56





 09/23/18 09:56


All other labs normal.








Assessment and Plan


Assessment and plan: 


Impression: Delirium. Today the patient is calm, but confused during the 

assessment.





Recommendation/Plan: At this time the main issue is the patient's delirium and 

not his history of psychosis. The patient's presentation is in line with his 

long hospitalization. Once his confusion improves the psy team can reevaluate 

the need for an antipsychotic. Spoke with Case Mgmt to provide the patient's 

family with local rehab facilities in their local area because they would like 

him to improve his strength per the record. Minimize medications that can cause 

confusion. We don't recommend any antipsychotics at this time and limit 

sedatives for PRN purposes. Psychiatry sign off. Recommend delirium precautions 

below:





1. Frequently reorient patient and involve him/her in their care (simple 

explanations of procedures, tests, medications).


2. Lights on and shades open during daytime hours.


3. Write date and goals of care in a visible place.


4. Try to avoid unnecessary interruptions to sleep during nighttime hours.


5. Obtain glasses, hearing aids from home if patient uses these at baseline.


6. Avoid medications that may exacerbate delirium (especially narcotics, 

benzodiazepines, barbiturates, ambien, lunesta, and medications with excessive 

anticholinergic properties).

## 2018-09-25 LAB
BASOPHILS # (AUTO): 0 K/MM3 (ref 0–0.1)
BASOPHILS NFR BLD AUTO: 0.4 % (ref 0–1.8)
BUN SERPL-MCNC: 8 MG/DL (ref 9–20)
BUN/CREAT SERPL: 16 %
CALCIUM SERPL-MCNC: 9.2 MG/DL (ref 8.4–10.2)
EOSINOPHIL # BLD AUTO: 0 K/MM3 (ref 0–0.4)
EOSINOPHIL NFR BLD AUTO: 0.8 % (ref 0–4.3)
HCT VFR BLD CALC: 37.1 % (ref 35.5–45.6)
HEMOLYSIS INDEX: 15
HGB BLD-MCNC: 12.7 GM/DL (ref 11.8–15.2)
LYMPHOCYTES # BLD AUTO: 0.9 K/MM3 (ref 1.2–5.4)
LYMPHOCYTES NFR BLD AUTO: 20.2 % (ref 13.4–35)
MCH RBC QN AUTO: 28 PG (ref 28–32)
MCHC RBC AUTO-ENTMCNC: 34 % (ref 32–34)
MCV RBC AUTO: 82 FL (ref 84–94)
MONOCYTES # (AUTO): 0.4 K/MM3 (ref 0–0.8)
MONOCYTES % (AUTO): 8.6 % (ref 0–7.3)
PLATELET # BLD: 148 K/MM3 (ref 140–440)
RBC # BLD AUTO: 4.54 M/MM3 (ref 3.65–5.03)

## 2018-09-25 RX ADMIN — DEXTROSE AND SODIUM CHLORIDE SCH MLS/HR: 5; .9 INJECTION, SOLUTION INTRAVENOUS at 06:20

## 2018-09-25 RX ADMIN — Medication SCH ML: at 22:42

## 2018-09-25 RX ADMIN — Medication SCH ML: at 10:36

## 2018-09-25 NOTE — PROGRESS NOTE
Assessment and Plan


Assessment and plan: 


58-year-old -American male with past medical history significant for 

schizophrenia was recently discharged from our hospital after treated for 

altered mental status, generalized muscular stiffness secondary to 

antipsychotic side effects.  Patient was discharged to personal home care  and 

came back to ED with acute worsening of his mental status and confusion.


Altered mental status


Delirium


Schizophrenia


-Psych consulted and recommended no antipsychotics


-Supportive care


-Patient is on restraints


DVT prophylaxis


- On Lovenox


Disposition


- Back to personal home care.








History


Interval history: 


Patient was seen and evaluated this morning, patient was alert and 

communicative.








Hospitalist Physical





- Physical exam


Narrative exam: 





 Not in cardiopulmonary distress. 


 The patient is emaciated.


 Vital signs as documented.


 Head exam is unremarkable.


 No scleral icterus .


 Neck is without jugular venous distension, thyromegaly, or carotid bruits. 


 Lungs are clear to auscultation.


Cardiac exam reveals regular rate and  Rhythm. First and second heart sounds 

normal. No murmurs, rubs or gallops. 


Abdominal exam reveals normal bowel sounds, no masses, no organomegaly and no 

aortic enlargement. 


Extremities are nonedematous and both femoral and pedal pulses are normal.


CNS: Alert and oriented 3.  No focal weakness.





- Constitutional


Vitals: 


 











Temp Pulse Resp BP Pulse Ox


 


 97.0 F L  80   18   110/76   99 


 


 09/24/18 17:40  09/24/18 17:40  09/24/18 17:40  09/24/18 17:39  09/24/18 17:40














Results





- Labs


CBC & Chem 7: 


 09/25/18 08:19





 09/25/18 08:19


Labs: 


 Laboratory Last Values











WBC  4.5 K/mm3 (4.5-11.0)   09/25/18  08:19    


 


RBC  4.54 M/mm3 (3.65-5.03)   09/25/18  08:19    


 


Hgb  12.7 gm/dl (11.8-15.2)   09/25/18  08:19    


 


Hct  37.1 % (35.5-45.6)  D 09/25/18  08:19    


 


MCV  82 fl (84-94)  L  09/25/18  08:19    


 


MCH  28 pg (28-32)   09/25/18  08:19    


 


MCHC  34 % (32-34)   09/25/18  08:19    


 


RDW  14.9 % (13.2-15.2)   09/25/18  08:19    


 


Plt Count  148 K/mm3 (140-440)   09/25/18  08:19    


 


Lymph % (Auto)  20.2 % (13.4-35.0)   09/25/18  08:19    


 


Mono % (Auto)  8.6 % (0.0-7.3)  H  09/25/18  08:19    


 


Eos % (Auto)  0.8 % (0.0-4.3)   09/25/18  08:19    


 


Baso % (Auto)  0.4 % (0.0-1.8)   09/25/18  08:19    


 


Lymph #  0.9 K/mm3 (1.2-5.4)  L  09/25/18  08:19    


 


Mono #  0.4 K/mm3 (0.0-0.8)   09/25/18  08:19    


 


Eos #  0.0 K/mm3 (0.0-0.4)   09/25/18  08:19    


 


Baso #  0.0 K/mm3 (0.0-0.1)   09/25/18  08:19    


 


Seg Neutrophils %  70.0 % (40.0-70.0)   09/25/18  08:19    


 


Seg Neutrophils #  3.2 K/mm3 (1.8-7.7)   09/25/18  08:19    


 


Sodium  142 mmol/L (137-145)   09/25/18  08:19    


 


Potassium  3.7 mmol/L (3.6-5.0)   09/25/18  08:19    


 


Chloride  107.0 mmol/L ()   09/25/18  08:19    


 


Carbon Dioxide  25 mmol/L (22-30)   09/25/18  08:19    


 


Anion Gap  14 mmol/L  09/25/18  08:19    


 


BUN  8 mg/dL (9-20)  L  09/25/18  08:19    


 


Creatinine  0.5 mg/dL (0.8-1.5)  L  09/25/18  08:19    


 


Estimated GFR  > 60 ml/min  09/25/18  08:19    


 


BUN/Creatinine Ratio  16 %  09/25/18  08:19    


 


Glucose  107 mg/dL ()  H  09/25/18  08:19    


 


Calcium  9.2 mg/dL (8.4-10.2)   09/25/18  08:19    


 


Total Bilirubin  1.30 mg/dL (0.1-1.2)  H  09/23/18  09:56    


 


AST  24 units/L (5-40)   09/23/18  09:56    


 


ALT  34 units/L (7-56)   09/23/18  09:56    


 


Alkaline Phosphatase  57 units/L ()   09/23/18  09:56    


 


Ammonia  19.0 umol/L (25-60)  L  09/23/18  09:56    


 


Total Protein  7.9 g/dL (6.3-8.2)   09/23/18  09:56    


 


Albumin  4.3 g/dL (3.9-5)   09/23/18  09:56    


 


Albumin/Globulin Ratio  1.2 %  09/23/18  09:56    


 


TSH  3.550 mlU/mL (0.270-4.200)   09/23/18  11:19    


 


Urine Color  Yellow  (Yellow)   09/24/18  12:47    


 


Urine Turbidity  Cloudy  (Clear)   09/24/18  12:47    


 


Urine pH  7.0  (5.0-7.0)   09/24/18  12:47    


 


Ur Specific Gravity  1.016  (1.003-1.030)   09/24/18  12:47    


 


Urine Protein  <15 mg/dl mg/dL (Negative)   09/24/18  12:47    


 


Urine Glucose (UA)  Neg mg/dL (Negative)   09/24/18  12:47    


 


Urine Ketones  Neg mg/dL (Negative)   09/24/18  12:47    


 


Urine Blood  Neg  (Negative)   09/24/18  12:47    


 


Urine Nitrite  Pos  (Negative)   09/24/18  12:47    


 


Urine Bilirubin  Neg  (Negative)   09/24/18  12:47    


 


Urine Urobilinogen  4.0 mg/dL (<2.0)   09/24/18  12:47    


 


Ur Leukocyte Esterase  Lg  (Negative)   09/24/18  12:47    


 


Urine WBC (Auto)  129.0 /HPF (0.0-6.0)  H  09/24/18  12:47    


 


Urine RBC (Auto)  8.0 /HPF (0.0-6.0)   09/24/18  12:47    


 


U Epithel Cells (Auto)  1.0 /HPF (0-13.0)   09/24/18  12:47    


 


Urine WBC Clumps  Few /HPF  09/24/18  12:47    


 


Urine Mucus  Few /HPF  09/24/18  12:47    


 


Urine Opiates Screen  Presumptive negative   09/24/18  12:47    


 


Urine Methadone Screen  Presumptive negative   09/24/18  12:47    


 


Ur Barbiturates Screen  Presumptive negative   09/24/18  12:47    


 


Ur Phencyclidine Scrn  Presumptive negative   09/24/18  12:47    


 


Ur Amphetamines Screen  Presumptive negative   09/24/18  12:47    


 


U Benzodiazepines Scrn  Presumptive negative   09/24/18  12:47    


 


Urine Cocaine Screen  Presumptive negative   09/24/18  12:47    


 


U Marijuana (THC) Screen  Presumptive negative   09/24/18  12:47    


 


Drugs of Abuse Note  Disclamer   09/24/18  12:47    


 


Plasma/Serum Alcohol  < 0.01 % (0-0.07)   09/23/18  09:56

## 2018-09-26 VITALS — SYSTOLIC BLOOD PRESSURE: 111 MMHG | DIASTOLIC BLOOD PRESSURE: 75 MMHG

## 2018-09-26 RX ADMIN — Medication SCH ML: at 10:04

## 2018-09-26 RX ADMIN — DEXTROSE AND SODIUM CHLORIDE SCH MLS/HR: 5; .9 INJECTION, SOLUTION INTRAVENOUS at 04:30

## 2018-09-26 NOTE — DISCHARGE SUMMARY
Providers





- Providers


Date of Admission: 


09/23/18 12:23





Attending physician: 


JONA AGUSTIN MD





 





09/23/18 10:51


Consult to Mental Health [CONS] Routine 


   Reason For Exam: psychosis


   Place consult to:: mental health


   Notified:: mental health


   Was contact made?: Yes


   If yes, spoke with:: KORINA


   Time called:: 15:06


   Comment:: CONSULT COMPLETED





09/24/18 13:05


Speech Therapy Evaluation and Treat [CONS] Routine 


   Reason For Exam: SWALLOW EVAL











Primary care physician: 


PRIMARY CARE MD








Hospitalization


Reason for admission: Altered mental status


Condition: Stable


Hospital course: 





58-year-old -American male with past medical history significant for 

schizophrenia was recently discharged from our hospital after treated for 

altered mental status, generalized muscular stiffness secondary to 

antipsychotic side effects.  Patient was discharged to personal home care  and 

came back to ED with acute worsening of his mental status and confusion.


Altered mental status getting better with supportive care


Delirium, getting better with supportive care


Schizophrenia; Psych consulted and recommended no antipsychotics


Patient showed improvement and discharged to personal home care.  Nothing much 

can be done in the hospital different from the personal home care.


Disposition: DC-01 TO HOME OR SELFCARE


Time spent for discharge: 32 minutes





- Discharge Diagnoses


(1) Altered mental status


Status: Acute   





(2) Combative behavior


Status: Acute   





(3) Schizophrenia


Status: Acute   





(4) Catatonic state


Status: Acute   





(5) Dehydration


Status: Acute   





(6) Malnutrition


Status: Acute   





Core Measure Documentation





- Palliative Care


Palliative Care/ Comfort Measures: Not Applicable





- Core Measures


Any of the following diagnoses?: none





Exam





- Physical Exam


Narrative exam: 





 Not in cardiopulmonary distress. 


 The patient is emaciated.


 Vital signs as documented.


 Head exam is unremarkable.


 No scleral icterus .


 Neck is without jugular venous distension, thyromegaly, or carotid bruits. 


 Lungs are clear to auscultation.


Cardiac exam reveals regular rate and  Rhythm. First and second heart sounds 

normal. No murmurs, rubs or gallops. 


Abdominal exam reveals normal bowel sounds, no masses, no organomegaly and no 

aortic enlargement. 


Extremities are nonedematous and both femoral and pedal pulses are normal.


CNS: Alert and oriented 3.  No focal weakness.





- Constitutional


Vitals: 


 











Temp Pulse Resp BP Pulse Ox


 


 97.7 F   65   12   119/81   97 


 


 09/26/18 00:37  09/26/18 00:37  09/26/18 00:37  09/26/18 00:37  09/26/18 00:37














Plan


Activity: advance as tolerated


Weight Bearing Status: Weight Bear as Tolerated


Diet: advance as tolerated


Follow up with: 


PRIMARY CARE,MD [Primary Care Provider] - 3-5 Days


Prescriptions: 


traZODone [Desyrel] 50 mg PO QHS PRN #30 tab


 PRN Reason: Insomnia

## 2024-05-15 NOTE — PHYSICIAN PROGRESS NOTE
NEUROLOGY PROGRESS REPORT



SUBJECTIVE:  The patient is being evaluated because of altered mental status. 

He appears to have a chronic problem with this, but from the history, it was

given that he started to have hallucination at the end of 07/2018.  The

psychiatrist has been following him.  He has always been described to be in a

catatonic state.  We still do not know the status of his brain at this time,

especially so since we do not have any adequate history.



OBJECTIVE:  

GENERAL:  The patient is not talking.  He is just quiet.

VITAL SIGNS:  Have been obtained.

HEART AND LUNGS:  Good.

NEUROLOGIC:  He appeared to be more mute.  He grimaced to pain.  His motor

examination, he resists movement and has hypertonicity.  I believe this is more

of his problem rather than catatonia.



ASSESSMENT:  Disturbance of higher cortical function, etiology not very clear,

but it is suspicious that he has had a previous cerebral injury.  I am not sure

whether he has retardation.  He has a finding of hypertonicity.  Rule out also

drug effect.



RECOMMENDATION:  An MRI has been done and we will review this.  We will also do

an EEG.



Prognosis overall looks poor.





DD: 08/22/2018 17:24

DT: 08/23/2018 00:56

JOB# [id number]  [id number]

[employer] FREE TEXT